# Patient Record
Sex: FEMALE | Race: NATIVE HAWAIIAN OR OTHER PACIFIC ISLANDER | NOT HISPANIC OR LATINO | URBAN - METROPOLITAN AREA
[De-identification: names, ages, dates, MRNs, and addresses within clinical notes are randomized per-mention and may not be internally consistent; named-entity substitution may affect disease eponyms.]

---

## 2018-09-20 ENCOUNTER — EMERGENCY (EMERGENCY)
Facility: HOSPITAL | Age: 83
LOS: 1 days | Discharge: HOME | End: 2018-09-20
Admitting: EMERGENCY MEDICINE
Payer: MEDICARE

## 2018-09-20 VITALS
TEMPERATURE: 98 F | RESPIRATION RATE: 18 BRPM | DIASTOLIC BLOOD PRESSURE: 80 MMHG | OXYGEN SATURATION: 98 % | HEART RATE: 69 BPM | SYSTOLIC BLOOD PRESSURE: 159 MMHG

## 2018-09-20 VITALS
RESPIRATION RATE: 18 BRPM | OXYGEN SATURATION: 97 % | WEIGHT: 162.7 LBS | TEMPERATURE: 98 F | HEART RATE: 94 BPM | SYSTOLIC BLOOD PRESSURE: 151 MMHG | DIASTOLIC BLOOD PRESSURE: 87 MMHG

## 2018-09-20 DIAGNOSIS — N63.20 UNSPECIFIED LUMP IN THE LEFT BREAST, UNSPECIFIED QUADRANT: ICD-10-CM

## 2018-09-20 PROCEDURE — 99284 EMERGENCY DEPT VISIT MOD MDM: CPT

## 2018-09-20 PROCEDURE — 76641 ULTRASOUND BREAST COMPLETE: CPT | Mod: 26,LT

## 2018-09-20 PROCEDURE — 76641 ULTRASOUND BREAST COMPLETE: CPT

## 2018-09-20 NOTE — ED PROVIDER NOTE - OBJECTIVE STATEMENT
82 yo female w/o any significant PMH, not on any medications, in the ER c/o breast lump that she noted few days ago. Pt reports rent mechanical fall on left side. Had pain to her left shoulder, arm, knee, had facial abrasions and everything healed. Pt had no bruise on her left breast and reports no pain after she fell. Accidentally she noted this lump today, which is not painful either.  Pt denies any discharge from her nipples. Pt had never had any mammogram or breast US done yet.

## 2018-09-20 NOTE — ED ADULT NURSE NOTE - OBJECTIVE STATEMENT
Pt reports mechanical fall on August 22, had pain to left side of body. Pt reports lump to left breast a week and a half after the fall. Pt denies any pain, denies chills, fever.

## 2018-09-20 NOTE — ED PROVIDER NOTE - PHYSICAL EXAMINATION
left breast- palpable lump/mass with some overlying skin dimpling  at 1-2 o'clock laterally from the nipple, no nipple discharge,

## 2018-09-20 NOTE — ED PROVIDER NOTE - MEDICAL DECISION MAKING DETAILS
84 yo female , generally healthy and not on any medications, in the Er c/o left breast lump. Pt had never had breast mammogram done. no signs of infection on the skin, no nipple discharge on exam. palpable lump+, non-painful. limited breast US done. pt will f/u with  tomorrow for further evaluation and treatment. Importance to f/u explained, possibility of breast CA discussed. pt verbalized understanding

## 2018-09-20 NOTE — ED ADULT NURSE NOTE - NSIMPLEMENTINTERV_GEN_ALL_ED
Implemented All Fall Risk Interventions:  [place] to call system. Call bell, personal items and telephone within reach. Instruct patient to call for assistance. Room bathroom lighting operational. Non-slip footwear when patient is off stretcher. Physically safe environment: no spills, clutter or unnecessary equipment. Stretcher in lowest position, wheels locked, appropriate side rails in place. Provide visual cue, wrist band, yellow gown, etc. Monitor gait and stability. Monitor for mental status changes and reorient to person, place, and time. Review medications for side effects contributing to fall risk. Reinforce activity limits and safety measures with patient and family.

## 2018-09-20 NOTE — ED ADULT TRIAGE NOTE - CHIEF COMPLAINT QUOTE
Pt states she had a mechanical fall on 8/22, hurt the L side of her body, but not feels fine. Pt since then she noticed a lump by her R breast and is wondering if it's related. Pt does not have a doctor, hasn't seen one in years.

## 2018-09-26 ENCOUNTER — APPOINTMENT (OUTPATIENT)
Dept: BREAST CENTER | Facility: CLINIC | Age: 83
End: 2018-09-26
Payer: MEDICARE

## 2018-09-26 VITALS
SYSTOLIC BLOOD PRESSURE: 124 MMHG | WEIGHT: 162 LBS | HEIGHT: 66 IN | HEART RATE: 75 BPM | BODY MASS INDEX: 26.03 KG/M2 | DIASTOLIC BLOOD PRESSURE: 79 MMHG

## 2018-09-26 PROCEDURE — 99203 OFFICE O/P NEW LOW 30 MIN: CPT

## 2018-09-28 ENCOUNTER — APPOINTMENT (OUTPATIENT)
Dept: INTERNAL MEDICINE | Facility: CLINIC | Age: 83
End: 2018-09-28
Payer: MEDICARE

## 2018-09-28 VITALS
HEART RATE: 80 BPM | BODY MASS INDEX: 26.15 KG/M2 | WEIGHT: 162 LBS | OXYGEN SATURATION: 100 % | DIASTOLIC BLOOD PRESSURE: 78 MMHG | SYSTOLIC BLOOD PRESSURE: 134 MMHG

## 2018-09-28 DIAGNOSIS — Z00.00 ENCOUNTER FOR GENERAL ADULT MEDICAL EXAMINATION W/OUT ABNORMAL FINDINGS: ICD-10-CM

## 2018-09-28 PROCEDURE — 99213 OFFICE O/P EST LOW 20 MIN: CPT | Mod: 25

## 2018-09-28 PROCEDURE — 36415 COLL VENOUS BLD VENIPUNCTURE: CPT

## 2018-09-28 NOTE — HISTORY OF PRESENT ILLNESS
[FreeTextEntry1] : Breast Mass;  [de-identified] : Breast mass with retraction of breast;  No other medical history;  No medication; No children

## 2018-09-28 NOTE — ASSESSMENT
[FreeTextEntry1] : Left breast mass which is likely malignant. Will need biopsy and have obtained all labs. To discuss with surgeon

## 2018-09-28 NOTE — PHYSICAL EXAM
[No Acute Distress] : no acute distress [Well Nourished] : well nourished [Well Developed] : well developed [Well-Appearing] : well-appearing [Normal Sclera/Conjunctiva] : normal sclera/conjunctiva [PERRL] : pupils equal round and reactive to light [EOMI] : extraocular movements intact [Fundoscopic Exam Performed] : fundoscopic ~T exam ~C was performed [Normal Outer Ear/Nose] : the outer ears and nose were normal in appearance [Normal Oropharynx] : the oropharynx was normal [Normal TMs] : both tympanic membranes were normal [Normal Nasal Mucosa] : the nasal mucosa was normal [No JVD] : no jugular venous distention [Supple] : supple [No Lymphadenopathy] : no lymphadenopathy [Thyroid Normal, No Nodules] : the thyroid was normal and there were no nodules present [No Respiratory Distress] : no respiratory distress  [No Accessory Muscle Use] : no accessory muscle use [Normal Rate] : normal rate  [Regular Rhythm] : with a regular rhythm [Normal S1, S2] : normal S1 and S2 [No Murmur] : no murmur heard [Soft] : abdomen soft [Non Tender] : non-tender [No HSM] : no HSM [Normal Bowel Sounds] : normal bowel sounds [Normal Supraclavicular Nodes] : no supraclavicular lymphadenopathy [No CVA Tenderness] : no CVA  tenderness [No Joint Swelling] : no joint swelling [Normal Gait] : normal gait [Coordination Grossly Intact] : coordination grossly intact [de-identified] : left breast with mass and retraction

## 2018-10-01 ENCOUNTER — RESULT REVIEW (OUTPATIENT)
Age: 83
End: 2018-10-01

## 2018-10-01 ENCOUNTER — FORM ENCOUNTER (OUTPATIENT)
Age: 83
End: 2018-10-01

## 2018-10-02 ENCOUNTER — OUTPATIENT (OUTPATIENT)
Dept: OUTPATIENT SERVICES | Facility: HOSPITAL | Age: 83
LOS: 1 days | End: 2018-10-02
Payer: MEDICARE

## 2018-10-02 ENCOUNTER — APPOINTMENT (OUTPATIENT)
Dept: MAMMOGRAPHY | Facility: HOSPITAL | Age: 83
End: 2018-10-02
Payer: MEDICARE

## 2018-10-02 LAB
25(OH)D3 SERPL-MCNC: 21.3 NG/ML
ALBUMIN SERPL ELPH-MCNC: 4.6 G/DL
ALP BLD-CCNC: 93 U/L
ALT SERPL-CCNC: 8 U/L
ANION GAP SERPL CALC-SCNC: 16 MMOL/L
APPEARANCE: CLEAR
APTT BLD: 30.7 SEC
AST SERPL-CCNC: 18 U/L
BACTERIA: NEGATIVE
BASOPHILS # BLD AUTO: 0.03 K/UL
BASOPHILS NFR BLD AUTO: 0.5 %
BILIRUB SERPL-MCNC: 1.2 MG/DL
BILIRUBIN URINE: NEGATIVE
BLOOD URINE: NEGATIVE
BUN SERPL-MCNC: 13 MG/DL
CALCIUM SERPL-MCNC: 10 MG/DL
CHLORIDE SERPL-SCNC: 99 MMOL/L
CHOLEST SERPL-MCNC: 261 MG/DL
CHOLEST/HDLC SERPL: 3.6 RATIO
CO2 SERPL-SCNC: 24 MMOL/L
COLOR: YELLOW
CREAT SERPL-MCNC: 0.72 MG/DL
EOSINOPHIL # BLD AUTO: 0.07 K/UL
EOSINOPHIL NFR BLD AUTO: 1.2 %
GLUCOSE QUALITATIVE U: NEGATIVE MG/DL
GLUCOSE SERPL-MCNC: 90 MG/DL
HBA1C MFR BLD HPLC: 6.1 %
HCT VFR BLD CALC: 38.5 %
HDLC SERPL-MCNC: 73 MG/DL
HGB BLD-MCNC: 12.1 G/DL
HYALINE CASTS: 1 /LPF
IMM GRANULOCYTES NFR BLD AUTO: 0.3 %
INR PPP: 0.98 RATIO
KETONES URINE: NEGATIVE
LDLC SERPL CALC-MCNC: 169 MG/DL
LEUKOCYTE ESTERASE URINE: NEGATIVE
LYMPHOCYTES # BLD AUTO: 1.38 K/UL
LYMPHOCYTES NFR BLD AUTO: 23 %
MAN DIFF?: NORMAL
MCHC RBC-ENTMCNC: 27.7 PG
MCHC RBC-ENTMCNC: 31.4 GM/DL
MCV RBC AUTO: 88.1 FL
MICROSCOPIC-UA: NORMAL
MONOCYTES # BLD AUTO: 0.48 K/UL
MONOCYTES NFR BLD AUTO: 8 %
NEUTROPHILS # BLD AUTO: 4.02 K/UL
NEUTROPHILS NFR BLD AUTO: 67 %
NITRITE URINE: NEGATIVE
PH URINE: 7
PLATELET # BLD AUTO: 275 K/UL
POTASSIUM SERPL-SCNC: 4.4 MMOL/L
PROT SERPL-MCNC: 7.6 G/DL
PROTEIN URINE: NEGATIVE MG/DL
PT BLD: 11.1 SEC
RBC # BLD: 4.37 M/UL
RBC # FLD: 15.7 %
RED BLOOD CELLS URINE: 6 /HPF
SODIUM SERPL-SCNC: 138 MMOL/L
SPECIFIC GRAVITY URINE: 1.02
SQUAMOUS EPITHELIAL CELLS: 1 /HPF
TRIGL SERPL-MCNC: 96 MG/DL
UROBILINOGEN URINE: NEGATIVE MG/DL
WBC # FLD AUTO: 6 K/UL
WHITE BLOOD CELLS URINE: 1 /HPF

## 2018-10-02 PROCEDURE — 77062 BREAST TOMOSYNTHESIS BI: CPT | Mod: 26

## 2018-10-02 PROCEDURE — 88305 TISSUE EXAM BY PATHOLOGIST: CPT

## 2018-10-02 PROCEDURE — G0279: CPT

## 2018-10-02 PROCEDURE — 88360 TUMOR IMMUNOHISTOCHEM/MANUAL: CPT

## 2018-10-02 PROCEDURE — G0279: CPT | Mod: 26

## 2018-10-02 PROCEDURE — 77065 DX MAMMO INCL CAD UNI: CPT | Mod: 26,LT,59

## 2018-10-02 PROCEDURE — 19083 BX BREAST 1ST LESION US IMAG: CPT

## 2018-10-02 PROCEDURE — 77066 DX MAMMO INCL CAD BI: CPT

## 2018-10-02 PROCEDURE — 77065 DX MAMMO INCL CAD UNI: CPT

## 2018-10-02 PROCEDURE — A4648: CPT

## 2018-10-02 PROCEDURE — 19083 BX BREAST 1ST LESION US IMAG: CPT | Mod: LT

## 2018-10-02 PROCEDURE — 77066 DX MAMMO INCL CAD BI: CPT | Mod: 26

## 2018-10-03 LAB
SURGICAL PATHOLOGY STUDY: SIGNIFICANT CHANGE UP
SURGICAL PATHOLOGY STUDY: SIGNIFICANT CHANGE UP

## 2018-10-12 ENCOUNTER — APPOINTMENT (OUTPATIENT)
Dept: BREAST CENTER | Facility: CLINIC | Age: 83
End: 2018-10-12
Payer: MEDICARE

## 2018-10-12 VITALS
HEART RATE: 68 BPM | WEIGHT: 162 LBS | BODY MASS INDEX: 26.03 KG/M2 | HEIGHT: 66 IN | DIASTOLIC BLOOD PRESSURE: 77 MMHG | TEMPERATURE: 97.7 F | SYSTOLIC BLOOD PRESSURE: 135 MMHG

## 2018-10-12 PROCEDURE — 99214 OFFICE O/P EST MOD 30 MIN: CPT

## 2018-10-22 ENCOUNTER — CHART COPY (OUTPATIENT)
Age: 83
End: 2018-10-22

## 2018-10-23 ENCOUNTER — CHART COPY (OUTPATIENT)
Age: 83
End: 2018-10-23

## 2018-10-23 DIAGNOSIS — R92.8 OTHER ABNORMAL AND INCONCLUSIVE FINDINGS ON DIAGNOSTIC IMAGING OF BREAST: ICD-10-CM

## 2018-10-24 ENCOUNTER — CHART COPY (OUTPATIENT)
Age: 83
End: 2018-10-24

## 2018-11-04 ENCOUNTER — RESULT REVIEW (OUTPATIENT)
Age: 83
End: 2018-11-04

## 2018-11-04 ENCOUNTER — FORM ENCOUNTER (OUTPATIENT)
Age: 83
End: 2018-11-04

## 2018-11-05 ENCOUNTER — OUTPATIENT (OUTPATIENT)
Dept: OUTPATIENT SERVICES | Facility: HOSPITAL | Age: 83
LOS: 1 days | End: 2018-11-05
Payer: MEDICARE

## 2018-11-05 ENCOUNTER — APPOINTMENT (OUTPATIENT)
Dept: MAMMOGRAPHY | Facility: HOSPITAL | Age: 83
End: 2018-11-05
Payer: MEDICARE

## 2018-11-05 PROCEDURE — 19081 BX BREAST 1ST LESION STRTCTC: CPT

## 2018-11-05 PROCEDURE — A4648: CPT

## 2018-11-05 PROCEDURE — 88305 TISSUE EXAM BY PATHOLOGIST: CPT

## 2018-11-05 PROCEDURE — 19081 BX BREAST 1ST LESION STRTCTC: CPT | Mod: RT

## 2018-11-06 LAB — SURGICAL PATHOLOGY STUDY: SIGNIFICANT CHANGE UP

## 2018-11-11 ENCOUNTER — FORM ENCOUNTER (OUTPATIENT)
Age: 83
End: 2018-11-11

## 2018-11-12 ENCOUNTER — APPOINTMENT (OUTPATIENT)
Dept: INTERNAL MEDICINE | Facility: CLINIC | Age: 83
End: 2018-11-12
Payer: MEDICARE

## 2018-11-12 ENCOUNTER — OUTPATIENT (OUTPATIENT)
Dept: OUTPATIENT SERVICES | Facility: HOSPITAL | Age: 83
LOS: 1 days | End: 2018-11-12
Payer: MEDICARE

## 2018-11-12 VITALS
BODY MASS INDEX: 26.03 KG/M2 | HEART RATE: 77 BPM | OXYGEN SATURATION: 95 % | SYSTOLIC BLOOD PRESSURE: 144 MMHG | DIASTOLIC BLOOD PRESSURE: 83 MMHG | TEMPERATURE: 99.2 F | HEIGHT: 66 IN | WEIGHT: 162 LBS

## 2018-11-12 DIAGNOSIS — Z01.818 ENCOUNTER FOR OTHER PREPROCEDURAL EXAMINATION: ICD-10-CM

## 2018-11-12 DIAGNOSIS — N63.20 UNSPECIFIED LUMP IN THE LEFT BREAST, UNSPECIFIED QUADRANT: ICD-10-CM

## 2018-11-12 PROCEDURE — 71046 X-RAY EXAM CHEST 2 VIEWS: CPT | Mod: 26

## 2018-11-12 PROCEDURE — 99213 OFFICE O/P EST LOW 20 MIN: CPT | Mod: 25

## 2018-11-12 PROCEDURE — 71046 X-RAY EXAM CHEST 2 VIEWS: CPT

## 2018-11-12 PROCEDURE — 36415 COLL VENOUS BLD VENIPUNCTURE: CPT

## 2018-11-12 NOTE — PHYSICAL EXAM
[No Acute Distress] : no acute distress [Well Nourished] : well nourished [Well Developed] : well developed [Well-Appearing] : well-appearing [Normal Sclera/Conjunctiva] : normal sclera/conjunctiva [PERRL] : pupils equal round and reactive to light [EOMI] : extraocular movements intact [Fundoscopic Exam Performed] : fundoscopic ~T exam ~C was performed [Normal Outer Ear/Nose] : the outer ears and nose were normal in appearance [Normal Oropharynx] : the oropharynx was normal [Normal TMs] : both tympanic membranes were normal [Normal Nasal Mucosa] : the nasal mucosa was normal [No JVD] : no jugular venous distention [Supple] : supple [No Lymphadenopathy] : no lymphadenopathy [Thyroid Normal, No Nodules] : the thyroid was normal and there were no nodules present [No Respiratory Distress] : no respiratory distress  [Clear to Auscultation] : lungs were clear to auscultation bilaterally [No Accessory Muscle Use] : no accessory muscle use [Normal Percussion] : the chest was normal to percussion [Normal Rate] : normal rate  [Regular Rhythm] : with a regular rhythm [No Joint Swelling] : no joint swelling [Grossly Normal Strength/Tone] : grossly normal strength/tone [de-identified] : Left breast mass

## 2018-11-12 NOTE — HISTORY OF PRESENT ILLNESS
[No Pertinent Cardiac History] : no history of aortic stenosis, atrial fibrillation, coronary artery disease, recent myocardial infarction, or implantable device/pacemaker [No Adverse Anesthesia Reaction] : no adverse anesthesia reaction in self or family member [Family Member] : no family member with adverse anesthesia reaction/sudden death [Self] : no previous adverse anesthesia reaction [Chronic Anticoagulation] : no chronic anticoagulation [Chronic Kidney Disease] : no chronic kidney disease [Diabetes] : no diabetes [FreeTextEntry1] : Left Mastectomy [FreeTextEntry2] : 11/19/2018 [FreeTextEntry3] : Dr. lopez [FreeTextEntry4] : Left breast cancer. For mastectomy. No other medical problems;  Take no medications; \par No Aspirin

## 2018-11-13 ENCOUNTER — CHART COPY (OUTPATIENT)
Age: 83
End: 2018-11-13

## 2018-11-13 LAB
ALBUMIN SERPL ELPH-MCNC: 4.3 G/DL
ALP BLD-CCNC: 90 U/L
ALT SERPL-CCNC: 6 U/L
ANION GAP SERPL CALC-SCNC: 13 MMOL/L
APPEARANCE: CLEAR
APTT BLD: 29.2 SEC
AST SERPL-CCNC: 16 U/L
BACTERIA: NEGATIVE
BASOPHILS # BLD AUTO: 0.02 K/UL
BASOPHILS NFR BLD AUTO: 0.3 %
BILIRUB SERPL-MCNC: 0.8 MG/DL
BILIRUBIN URINE: NEGATIVE
BLOOD URINE: ABNORMAL
BUN SERPL-MCNC: 10 MG/DL
CALCIUM SERPL-MCNC: 9.8 MG/DL
CHLORIDE SERPL-SCNC: 102 MMOL/L
CO2 SERPL-SCNC: 22 MMOL/L
COLOR: YELLOW
CREAT SERPL-MCNC: 0.76 MG/DL
EOSINOPHIL # BLD AUTO: 0.05 K/UL
EOSINOPHIL NFR BLD AUTO: 0.9 %
GLUCOSE QUALITATIVE U: NEGATIVE MG/DL
GLUCOSE SERPL-MCNC: 95 MG/DL
HCT VFR BLD CALC: 36.8 %
HGB BLD-MCNC: 11.8 G/DL
HYALINE CASTS: 2 /LPF
IMM GRANULOCYTES NFR BLD AUTO: 0 %
INR PPP: 0.98 RATIO
KETONES URINE: NEGATIVE
LEUKOCYTE ESTERASE URINE: NEGATIVE
LYMPHOCYTES # BLD AUTO: 1.17 K/UL
LYMPHOCYTES NFR BLD AUTO: 20.2 %
MAN DIFF?: NORMAL
MCHC RBC-ENTMCNC: 28.5 PG
MCHC RBC-ENTMCNC: 32.1 GM/DL
MCV RBC AUTO: 88.9 FL
MICROSCOPIC-UA: NORMAL
MONOCYTES # BLD AUTO: 0.35 K/UL
MONOCYTES NFR BLD AUTO: 6 %
NEUTROPHILS # BLD AUTO: 4.2 K/UL
NEUTROPHILS NFR BLD AUTO: 72.6 %
NITRITE URINE: NEGATIVE
PH URINE: 6
PLATELET # BLD AUTO: 310 K/UL
POTASSIUM SERPL-SCNC: 4.4 MMOL/L
PROT SERPL-MCNC: 7.1 G/DL
PROTEIN URINE: NEGATIVE MG/DL
PT BLD: 11 SEC
RBC # BLD: 4.14 M/UL
RBC # FLD: 15.5 %
RED BLOOD CELLS URINE: 6 /HPF
SODIUM SERPL-SCNC: 137 MMOL/L
SPECIFIC GRAVITY URINE: 1.02
SQUAMOUS EPITHELIAL CELLS: 2 /HPF
UROBILINOGEN URINE: NEGATIVE MG/DL
WBC # FLD AUTO: 5.79 K/UL
WHITE BLOOD CELLS URINE: 4 /HPF

## 2018-11-16 VITALS
RESPIRATION RATE: 18 BRPM | HEIGHT: 66 IN | WEIGHT: 157.85 LBS | SYSTOLIC BLOOD PRESSURE: 118 MMHG | OXYGEN SATURATION: 97 % | TEMPERATURE: 98 F | HEART RATE: 79 BPM | DIASTOLIC BLOOD PRESSURE: 75 MMHG

## 2018-11-17 ENCOUNTER — FORM ENCOUNTER (OUTPATIENT)
Age: 83
End: 2018-11-17

## 2018-11-18 ENCOUNTER — OUTPATIENT (OUTPATIENT)
Dept: OUTPATIENT SERVICES | Facility: HOSPITAL | Age: 83
LOS: 1 days | End: 2018-11-18
Payer: MEDICARE

## 2018-11-18 DIAGNOSIS — Z90.89 ACQUIRED ABSENCE OF OTHER ORGANS: Chronic | ICD-10-CM

## 2018-11-18 PROCEDURE — 78195 LYMPH SYSTEM IMAGING: CPT | Mod: 26

## 2018-11-19 ENCOUNTER — OUTPATIENT (OUTPATIENT)
Dept: INPATIENT UNIT | Facility: HOSPITAL | Age: 83
LOS: 1 days | Discharge: HOME | End: 2018-11-19
Payer: MEDICARE

## 2018-11-19 ENCOUNTER — RESULT REVIEW (OUTPATIENT)
Age: 83
End: 2018-11-19

## 2018-11-19 ENCOUNTER — APPOINTMENT (OUTPATIENT)
Dept: BREAST CENTER | Facility: HOSPITAL | Age: 83
End: 2018-11-19

## 2018-11-19 DIAGNOSIS — Z90.89 ACQUIRED ABSENCE OF OTHER ORGANS: Chronic | ICD-10-CM

## 2018-11-19 PROCEDURE — 19307 MAST MOD RAD: CPT | Mod: LT

## 2018-11-19 PROCEDURE — 19303 MAST SIMPLE COMPLETE: CPT | Mod: GC

## 2018-11-19 PROCEDURE — 38745 REMOVE ARMPIT LYMPH NODES: CPT | Mod: GC,59

## 2018-11-19 PROCEDURE — 38525 BIOPSY/REMOVAL LYMPH NODES: CPT | Mod: GC,59

## 2018-11-19 RX ORDER — SODIUM CHLORIDE 9 MG/ML
1000 INJECTION, SOLUTION INTRAVENOUS
Qty: 0 | Refills: 0 | Status: DISCONTINUED | OUTPATIENT
Start: 2018-11-19 | End: 2018-11-20

## 2018-11-19 RX ORDER — OXYCODONE HYDROCHLORIDE 5 MG/1
5 TABLET ORAL
Qty: 0 | Refills: 0 | Status: DISCONTINUED | OUTPATIENT
Start: 2018-11-19 | End: 2018-11-20

## 2018-11-19 RX ORDER — OXYCODONE HYDROCHLORIDE 5 MG/1
10 TABLET ORAL EVERY 4 HOURS
Qty: 0 | Refills: 0 | Status: DISCONTINUED | OUTPATIENT
Start: 2018-11-19 | End: 2018-11-20

## 2018-11-19 RX ORDER — ONDANSETRON 8 MG/1
4 TABLET, FILM COATED ORAL
Qty: 0 | Refills: 0 | Status: DISCONTINUED | OUTPATIENT
Start: 2018-11-19 | End: 2018-11-20

## 2018-11-19 NOTE — PROGRESS NOTE ADULT - ATTENDING COMMENTS
I agree with above  pateint can likely be discharged in the am with pain control measures and vns for drain care

## 2018-11-19 NOTE — BRIEF OPERATIVE NOTE - PROCEDURE
<<-----Click on this checkbox to enter Procedure Simple mastectomy with biopsy of sentinel lymph node, with axillary lymph node dissection if indicated  11/19/2018  Left side. Axillary lymph node dissection performed  Active  MANPREET

## 2018-11-19 NOTE — BRIEF OPERATIVE NOTE - OPERATION/FINDINGS
An elliptical incision with made with the scalpel.  Electrocautery was used to dissect to level of pectoralis muscle.  A sentinel lymph node was identified using the radioactive detector probe.  Frozen section of sentinel node revealed carcinoma, so axillary lymph node dissection was performed.  Thoracodorsal nerve, long thoracic nerve, axillary vein, pectoralis major muscle, pectoralis minor muscle, latissimus dorsi muscle, and serratus muscle were identified intraoperatively.  Axillary lymph nodes were collected.  Incision was closed using vicryl suture for deep dermal layer and monocryl for epidermal layer.

## 2018-11-20 VITALS
RESPIRATION RATE: 18 BRPM | TEMPERATURE: 99 F | DIASTOLIC BLOOD PRESSURE: 76 MMHG | SYSTOLIC BLOOD PRESSURE: 120 MMHG | HEART RATE: 68 BPM | OXYGEN SATURATION: 98 %

## 2018-11-20 LAB
ANION GAP SERPL CALC-SCNC: 11 MMOL/L — SIGNIFICANT CHANGE UP (ref 5–17)
BUN SERPL-MCNC: 11 MG/DL — SIGNIFICANT CHANGE UP (ref 7–23)
CALCIUM SERPL-MCNC: 8.6 MG/DL — SIGNIFICANT CHANGE UP (ref 8.4–10.5)
CHLORIDE SERPL-SCNC: 104 MMOL/L — SIGNIFICANT CHANGE UP (ref 96–108)
CO2 SERPL-SCNC: 22 MMOL/L — SIGNIFICANT CHANGE UP (ref 22–31)
CREAT SERPL-MCNC: 0.59 MG/DL — SIGNIFICANT CHANGE UP (ref 0.5–1.3)
GLUCOSE SERPL-MCNC: 104 MG/DL — HIGH (ref 70–99)
HCT VFR BLD CALC: 31.3 % — LOW (ref 34.5–45)
HGB BLD-MCNC: 10.2 G/DL — LOW (ref 11.5–15.5)
MAGNESIUM SERPL-MCNC: 2.3 MG/DL — SIGNIFICANT CHANGE UP (ref 1.6–2.6)
MCHC RBC-ENTMCNC: 28.1 PG — SIGNIFICANT CHANGE UP (ref 27–34)
MCHC RBC-ENTMCNC: 32.6 G/DL — SIGNIFICANT CHANGE UP (ref 32–36)
MCV RBC AUTO: 86.2 FL — SIGNIFICANT CHANGE UP (ref 80–100)
PHOSPHATE SERPL-MCNC: 3.2 MG/DL — SIGNIFICANT CHANGE UP (ref 2.5–4.5)
PLATELET # BLD AUTO: 223 K/UL — SIGNIFICANT CHANGE UP (ref 150–400)
POTASSIUM SERPL-MCNC: 4 MMOL/L — SIGNIFICANT CHANGE UP (ref 3.5–5.3)
POTASSIUM SERPL-SCNC: 4 MMOL/L — SIGNIFICANT CHANGE UP (ref 3.5–5.3)
RBC # BLD: 3.63 M/UL — LOW (ref 3.8–5.2)
RBC # FLD: 15 % — SIGNIFICANT CHANGE UP (ref 10.3–16.9)
SODIUM SERPL-SCNC: 137 MMOL/L — SIGNIFICANT CHANGE UP (ref 135–145)
WBC # BLD: 8.1 K/UL — SIGNIFICANT CHANGE UP (ref 3.8–10.5)
WBC # FLD AUTO: 8.1 K/UL — SIGNIFICANT CHANGE UP (ref 3.8–10.5)

## 2018-11-20 NOTE — PROGRESS NOTE ADULT - ASSESSMENT
82 yo female s/p left breast mastectomy and sentinal lymph node biopsy    admit for 23 hr obs  pain/nausea control  IVF  regular diet as tolerated  DVT ppx  AM labs

## 2018-11-20 NOTE — PROGRESS NOTE ADULT - SUBJECTIVE AND OBJECTIVE BOX
INTERVAL HPI/OVERNIGHT EVENTS: JANIE    STATUS POST:  left mastectomy, sentinel lymph node biopsy    POST OPERATIVE DAY #: 1    SUBJECTIVE:  pt seen at bedside, denies pain, numbness/tingling in the extremities    MEDICATIONS  (STANDING):  lactated ringers. 1000 milliLiter(s) (100 mL/Hr) IV Continuous <Continuous>    MEDICATIONS  (PRN):  ondansetron Injectable 4 milliGRAM(s) IV Push every 2 hours PRN Nausea and/or Vomiting  oxyCODONE    IR 10 milliGRAM(s) Oral every 4 hours PRN Severe Pain (7 - 10)  oxyCODONE    IR 5 milliGRAM(s) Oral every 3 hours PRN Severe Pain (7 - 10)      Vital Signs Last 24 Hrs  T(C): 37.6 (20 Nov 2018 05:14), Max: 37.7 (19 Nov 2018 17:22)  T(F): 99.6 (20 Nov 2018 05:14), Max: 99.9 (19 Nov 2018 17:22)  HR: 83 (20 Nov 2018 05:14) (62 - 86)  BP: 106/67 (20 Nov 2018 05:14) (106/67 - 138/82)  BP(mean): 82 (19 Nov 2018 15:43) (79 - 87)  RR: 18 (20 Nov 2018 05:14) (11 - 18)  SpO2: 96% (20 Nov 2018 05:14) (96% - 100%)    PHYSICAL EXAM:      Constitutional: A&Ox3    Breasts: incision c/d/i with mild bruising, soft, JASON x 2 in place to self suction    Respiratory: non labored breathing, no respiratory distress    Cardiovascular: NSR, RRR    Gastrointestinal: soft, nontender, nondistended    Extremities: (-) edema                  I&O's Detail    19 Nov 2018 07:01  -  20 Nov 2018 07:00  --------------------------------------------------------  IN:    lactated ringers.: 1600 mL  Total IN: 1600 mL    OUT:    Bulb: 175 mL    Bulb: 90 mL    Voided: 1000 mL  Total OUT: 1265 mL    Total NET: 335 mL      20 Nov 2018 07:01  -  20 Nov 2018 07:55  --------------------------------------------------------  IN:    lactated ringers.: 100 mL  Total IN: 100 mL    OUT:  Total OUT: 0 mL    Total NET: 100 mL          LABS:                        10.2   8.1   )-----------( 223      ( 20 Nov 2018 06:42 )             31.3     11-20    137  |  104  |  11  ----------------------------<  104<H>  4.0   |  22  |  0.59    Ca    8.6      20 Nov 2018 06:41  Phos  3.2     11-20  Mg     2.3     11-20            RADIOLOGY & ADDITIONAL STUDIES:
Team 1 Surgery Post-Op Note, PCN:     Pre-Op Dx: breast ca  Procedure: Simple mastectomy with biopsy of sentinel lymph node, with axillary lymph node dissection if indicated    Surgeon: Chema    Subjective: pt seen at bedside, denies pain, denies numbness/tingling in the upper extremities      Vital Signs Last 24 Hrs  T(C): 36.2 (19 Nov 2018 12:43), Max: 36.2 (19 Nov 2018 12:43)  T(F): 97.2 (19 Nov 2018 12:43), Max: 97.2 (19 Nov 2018 12:43)  HR: 72 (19 Nov 2018 13:43) (70 - 86)  BP: 115/61 (19 Nov 2018 13:43) (114/56 - 121/62)  BP(mean): 82 (19 Nov 2018 13:43) (79 - 85)  RR: 15 (19 Nov 2018 13:43) (12 - 16)  SpO2: 100% (19 Nov 2018 13:43) (98% - 100%)    Physical Exam:  General: NAD, resting comfortably in bed  Pulmonary: Nonlabored breathing, no respiratory distress  Cardiovascular: NSR  Breast: incisions c/d/i without ecchymosis or erythema, JASON drain x 2 in place with minimal serosang output  Abdominal: soft, NT/ND  Extremities: WWP, normal strength  Neuro: A/O x 3, no focal deficits, normal sensation  Pulses: palpable distal pulses      LABS:            CAPILLARY BLOOD GLUCOSE                  Radiology and Additional Studies:    Assessment:83y Female s/p above procedure    Plan:  Pain/nausea control PRN  Home meds  Incentive spirometer/OOB/Ambulate  IVF, Diet: regular as tolerated  AM labs  DC in AM

## 2018-11-21 LAB — SURGICAL PATHOLOGY STUDY: SIGNIFICANT CHANGE UP

## 2018-11-23 PROCEDURE — 78195 LYMPH SYSTEM IMAGING: CPT

## 2018-11-23 PROCEDURE — 83735 ASSAY OF MAGNESIUM: CPT

## 2018-11-23 PROCEDURE — A9541: CPT

## 2018-11-23 PROCEDURE — 36415 COLL VENOUS BLD VENIPUNCTURE: CPT

## 2018-11-23 PROCEDURE — 84100 ASSAY OF PHOSPHORUS: CPT

## 2018-11-23 PROCEDURE — 80048 BASIC METABOLIC PNL TOTAL CA: CPT

## 2018-11-23 PROCEDURE — 88331 PATH CONSLTJ SURG 1 BLK 1SPC: CPT

## 2018-11-23 PROCEDURE — 19302 P-MASTECTOMY W/LN REMOVAL: CPT | Mod: LT

## 2018-11-23 PROCEDURE — 85027 COMPLETE CBC AUTOMATED: CPT

## 2018-11-23 PROCEDURE — 88305 TISSUE EXAM BY PATHOLOGIST: CPT

## 2018-11-23 PROCEDURE — 88332 PATH CONSLTJ SURG EA ADD BLK: CPT

## 2018-11-23 PROCEDURE — 88307 TISSUE EXAM BY PATHOLOGIST: CPT

## 2018-11-28 ENCOUNTER — APPOINTMENT (OUTPATIENT)
Dept: BREAST CENTER | Facility: CLINIC | Age: 83
End: 2018-11-28
Payer: MEDICARE

## 2018-11-28 VITALS
SYSTOLIC BLOOD PRESSURE: 135 MMHG | TEMPERATURE: 98.6 F | WEIGHT: 162 LBS | HEART RATE: 81 BPM | BODY MASS INDEX: 26.03 KG/M2 | HEIGHT: 66 IN | DIASTOLIC BLOOD PRESSURE: 81 MMHG

## 2018-11-28 PROCEDURE — 99024 POSTOP FOLLOW-UP VISIT: CPT

## 2018-12-07 ENCOUNTER — CHART COPY (OUTPATIENT)
Age: 83
End: 2018-12-07

## 2018-12-07 ENCOUNTER — APPOINTMENT (OUTPATIENT)
Dept: BREAST CENTER | Facility: CLINIC | Age: 83
End: 2018-12-07

## 2018-12-07 VITALS
HEIGHT: 66 IN | SYSTOLIC BLOOD PRESSURE: 142 MMHG | DIASTOLIC BLOOD PRESSURE: 73 MMHG | BODY MASS INDEX: 26.03 KG/M2 | WEIGHT: 162 LBS | TEMPERATURE: 97.4 F | HEART RATE: 71 BPM

## 2019-01-10 PROBLEM — C50.919 MALIGNANT NEOPLASM OF UNSPECIFIED SITE OF UNSPECIFIED FEMALE BREAST: Chronic | Status: ACTIVE | Noted: 2018-11-16

## 2019-01-10 PROBLEM — N63.20 UNSPECIFIED LUMP IN THE LEFT BREAST, UNSPECIFIED QUADRANT: Chronic | Status: ACTIVE | Noted: 2018-11-16

## 2019-01-18 ENCOUNTER — APPOINTMENT (OUTPATIENT)
Dept: RADIATION ONCOLOGY | Facility: CLINIC | Age: 84
End: 2019-01-18
Payer: MEDICARE

## 2019-01-18 VITALS
OXYGEN SATURATION: 97 % | DIASTOLIC BLOOD PRESSURE: 83 MMHG | SYSTOLIC BLOOD PRESSURE: 148 MMHG | WEIGHT: 163.2 LBS | RESPIRATION RATE: 18 BRPM | HEIGHT: 66 IN | BODY MASS INDEX: 26.23 KG/M2 | HEART RATE: 78 BPM

## 2019-01-18 DIAGNOSIS — Z78.9 OTHER SPECIFIED HEALTH STATUS: ICD-10-CM

## 2019-01-18 PROCEDURE — 99205 OFFICE O/P NEW HI 60 MIN: CPT | Mod: 25

## 2019-01-18 NOTE — REVIEW OF SYSTEMS
[Patient Intake Form Reviewed] : Patient intake form was reviewed [Negative] : Allergic/Immunologic [de-identified] : s/p Left mastectomy, incision well healed [de-identified] : "ticked off"

## 2019-01-18 NOTE — OB/GYN HISTORY
[unknown] : the patient is unsure of the date of her LMP [Menopause Age: ____] : patient was [unfilled] years old at menopause

## 2019-01-18 NOTE — PHYSICAL EXAM
[Range of Motion to Joints] : range of motion to joints [Normal] : oriented to person, place and time, the affect was normal, the mood was normal and not anxious [de-identified] : s/p left mastectomy, lateral incision left breast, well healed, loose skin.  [de-identified] : s/p left mastectomy, lateral incision left breast, well healed, loose skin.

## 2019-01-18 NOTE — HISTORY OF PRESENT ILLNESS
[FreeTextEntry1] : Ms Astrid Gallardo is an 83F, never smoker, referred by Dr. Beasley for consideration of radiation therapy for LEFT breast cancer. \par \par She experienced a fall on 8/22/18 and subsequently palpated a left breast mass a week later and followed up in [facility] ED. She notes she did not have a PCP at the time as she had been generally healthy. Denies any skin changes or nipple discharge at the time. On 9/20/18, she underwent LEFT breast U/S, which revealed the following:\par At the 1:00 position, 4cmFN a 1.8 x 1.6 x 1.6 cm hypo/isoechoic mass with ill defined borders demonstrating internal vascularity; BI-RADS 5. A full mammogram evaluation with likely biopsy was recommended. She saw Dr. Bear for initial eval on 9/26/18, was noted to have skin dimpling.\par \par On 10/2/18, she underwent US guided biopsy of the mass. 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 Radiology was subsequently asked to comment on the RIGHT breast, and it showed the following:\par Multiple scattered and grouped calcifications present in the right breast. Some of the calcifications have a linear distribution pattern. \par Recommendation for stereotactic biopsy of the most suspicious osseous lesions in the right breast.\par \par On 11/5/18, she had stereotactic core biopsy (right mid lower breast) with pathology revealing the following:\par - Breast tissue with duct ectasia, cysts and usual ductal hyperplasia\par - Calcifications associated with benign epithelium and stroma\par \par She underwent LEFT mastectomy and ALND on 11/19/18.  Final pathology revealed the following: \par 1. Left sentinel lymph node, excision: - Metastatic carcinoma involving 1 lymph node with extranodal extension spanning 5 mm (1/1).  \par 2. Left breast, mastectomy:\par - Invasive duct carcinoma, well-differentiated with prominent central sclerosis. 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 DCIS is noted only adjacent to invasive tumor.\par -All margins negative for invasive and in-situ duct carcinoma with greater than 1 cm clearance.\par - Biopsy site changes.\par 3. Medial margin, excision: - Breast tissue negative for carcinoma.\par 4. Left axillary lymph node dissection:\par - 17 lymph nodes negative for tumor.\par - Focus of invasive duct carcinoma present within adjacent adipose tissue. \par \par Note: The largest contiguous lymph node metastasis in part 1 measures 3 mm in greatest dimension.  A focus of extranodal\par extension spanning 5mm is noted in part 1.  In part 4, A focus of carcinoma spanning 11 mm is noted within fat.  The tumor in parts 1 and 4 are morphologically similar to the tumor in part 2.\par \par She last saw Dr. Bear on 11/26/18, at which time she was advised to see Radiation Oncology and Medical Oncology. She had 2 JASON drains, second one removed approx 3 weeks post-op. She saw Dr. Beasley on 1/4/19, at which treatment options were discussed to include RT. Oncotype testing was arranged (result pending), and patient will see Dr. Beasley again on 1/25/19. \par \par Today, she reports she is feeling well, although understandably upset about her diagnosis. She denies breast pain, breast or UE edema, fever, chills, fatigue, CP, SOB, or any other c/o. \par               \par Family history is negative for breast or ovarian cancer.

## 2019-01-18 NOTE — VITALS
[Maximal Pain Intensity: 0/10] : 0/10 [Least Pain Intensity: 0/10] : 0/10 [90: Able to carry normal activity; minor signs or symptoms of disease.] : 90: Able to carry normal activity; minor signs or symptoms of disease.  [Date: ____________] : Patient's last distress assessment performed on [unfilled]. [3 - Distress Level] : Distress Level: 3 [FreeTextEntry7] : Feels a sense of "why me?"

## 2019-02-01 ENCOUNTER — OUTPATIENT (OUTPATIENT)
Dept: OUTPATIENT SERVICES | Facility: HOSPITAL | Age: 84
LOS: 1 days | End: 2019-02-01
Payer: MEDICARE

## 2019-02-01 ENCOUNTER — APPOINTMENT (OUTPATIENT)
Dept: RADIATION ONCOLOGY | Facility: CLINIC | Age: 84
End: 2019-02-01

## 2019-02-01 ENCOUNTER — APPOINTMENT (OUTPATIENT)
Dept: CT IMAGING | Facility: HOSPITAL | Age: 84
End: 2019-02-01
Payer: MEDICARE

## 2019-02-01 DIAGNOSIS — Z90.89 ACQUIRED ABSENCE OF OTHER ORGANS: Chronic | ICD-10-CM

## 2019-02-01 PROCEDURE — 71260 CT THORAX DX C+: CPT | Mod: 26

## 2019-02-01 PROCEDURE — 74177 CT ABD & PELVIS W/CONTRAST: CPT | Mod: 26

## 2019-02-01 PROCEDURE — 71260 CT THORAX DX C+: CPT

## 2019-02-01 PROCEDURE — 74177 CT ABD & PELVIS W/CONTRAST: CPT

## 2019-02-12 ENCOUNTER — APPOINTMENT (OUTPATIENT)
Dept: RADIATION ONCOLOGY | Facility: CLINIC | Age: 84
End: 2019-02-12
Payer: MEDICARE

## 2019-02-12 PROCEDURE — 99024 POSTOP FOLLOW-UP VISIT: CPT

## 2019-02-12 NOTE — HISTORY OF PRESENT ILLNESS
[FreeTextEntry1] : 2/12/19-\par Ms. Gallardo returns for consent signing for radiation therapy to the LEFT breast.\par \par Today, she XXX \par \par ONCOLOGIC HISTORY (1/18/19)\par Ms. Astrid Gallardo is an 83F, never smoker, referred by Dr. Beasley for consideration of radiation therapy for LEFT breast cancer. \par \par She experienced a fall on 8/22/18 and subsequently palpated a left breast mass a week later and followed up in [facility] ED. She notes she did not have a PCP at the time as she had been generally healthy. Denies any skin changes or nipple discharge at the time. On 9/20/18, she underwent LEFT breast U/S, which revealed the following:\par At the 1:00 position, 4cmFN a 1.8 x 1.6 x 1.6 cm hypo/isoechoic mass with ill defined borders demonstrating internal vascularity; BI-RADS 5. A full mammogram evaluation with likely biopsy was recommended. She saw Dr. Bear for initial eval on 9/26/18, was noted to have skin dimpling.\par \par On 10/2/18, she underwent US guided biopsy of the mass. 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 Radiology was subsequently asked to comment on the RIGHT breast, and it showed the following:\par Multiple scattered and grouped calcifications present in the right breast. Some of the calcifications have a linear distribution pattern. \par Recommendation for stereotactic biopsy of the most suspicious osseous lesions in the right breast.\par \par On 11/5/18, she had stereotactic core biopsy (right mid lower breast) with pathology revealing the following:\par - Breast tissue with duct ectasia, cysts and usual ductal hyperplasia\par - Calcifications associated with benign epithelium and stroma\par \par She underwent LEFT mastectomy and ALND on 11/19/18.  Final pathology revealed the following: \par 1. Left sentinel lymph node, excision: - Metastatic carcinoma involving 1 lymph node with extranodal extension spanning 5 mm (1/1).  \par 2. Left breast, mastectomy:\par - Invasive duct carcinoma, well-differentiated with prominent central sclerosis. 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 DCIS is noted only adjacent to invasive tumor.\par -All margins negative for invasive and in-situ duct carcinoma with greater than 1 cm clearance.\par - Biopsy site changes.\par 3. Medial margin, excision: - Breast tissue negative for carcinoma.\par 4. Left axillary lymph node dissection:\par - 17 lymph nodes negative for tumor.\par - Focus of invasive duct carcinoma present within adjacent adipose tissue. \par \par Note: The largest contiguous lymph node metastasis in part 1 measures 3 mm in greatest dimension.  A focus of extranodal\par extension spanning 5mm is noted in part 1.  In part 4, A focus of carcinoma spanning 11 mm is noted within fat.  The tumor in parts 1 and 4 are morphologically similar to the tumor in part 2.\par \par She last saw Dr. Bear on 11/26/18, at which time she was advised to see Radiation Oncology and Medical Oncology. She had 2 JASON drains, second one removed approx 3 weeks post-op. She saw Dr. Beasley on 1/4/19, at which treatment options were discussed to include RT. Oncotype testing was arranged (result pending), and patient will see Dr. Beasley again on 1/25/19. \par \par Today, she reports she is feeling well, although understandably upset about her diagnosis. She denies breast pain, breast or UE edema, fever, chills, fatigue, CP, SOB, or any other c/o. \par               \par Family history is negative for breast or ovarian cancer.

## 2019-02-12 NOTE — PHYSICAL EXAM
[Range of Motion to Joints] : range of motion to joints [Normal] : oriented to person, place and time, the affect was normal, the mood was normal and not anxious [de-identified] : s/p left mastectomy, lateral incision left breast, well healed, loose skin.  [de-identified] : s/p left mastectomy, lateral incision left breast, well healed, loose skin.

## 2019-02-12 NOTE — REVIEW OF SYSTEMS
[Patient Intake Form Reviewed] : Patient intake form was reviewed [Negative] : Allergic/Immunologic [de-identified] : s/p Left mastectomy, incision well healed [de-identified] : "ticked off"

## 2019-03-13 VITALS
SYSTOLIC BLOOD PRESSURE: 145 MMHG | OXYGEN SATURATION: 98 % | HEIGHT: 66 IN | TEMPERATURE: 98.1 F | WEIGHT: 165 LBS | HEART RATE: 92 BPM | BODY MASS INDEX: 26.52 KG/M2 | DIASTOLIC BLOOD PRESSURE: 86 MMHG | RESPIRATION RATE: 18 BRPM

## 2019-03-14 NOTE — PHYSICAL EXAM
[de-identified] : s/p left mastectomy, lateral incision left breast, well healed, loose skin.  [de-identified] : LUE edema [de-identified] : s/p left mastectomy, lateral incision left breast, well healed, loose skin. No erythema or hyperpigmentation noted.

## 2019-03-14 NOTE — HISTORY OF PRESENT ILLNESS
[FreeTextEntry1] : OTV- 3/6/19- Ms. Keyes has completed 1000/ 5000 cGy to the LEFT CW/ reg nodes/. Today she reports she is feeling well. Denies any beast pain or soreness, skin irritation, CP, SOB, dysphagia. Energy level is good. She continues to report LUE edema, no pain. She had a consult with OT for lymphedema therapy, but is looking for further recommendations for an OT.   \par \par \par ONCOLOGIC HISTORY (1/18/19)\par Ms. Astrid Gallardo is an 83F, never smoker, referred by Dr. Beasley for consideration of radiation therapy for LEFT breast cancer. \par \par She experienced a fall on 8/22/18 and subsequently palpated a left breast mass a week later and followed up in [facility] ED. She notes she did not have a PCP at the time as she had been generally healthy. Denies any skin changes or nipple discharge at the time. On 9/20/18, she underwent LEFT breast U/S, which revealed the following:\par At the 1:00 position, 4cmFN a 1.8 x 1.6 x 1.6 cm hypo/isoechoic mass with ill defined borders demonstrating internal vascularity; BI-RADS 5. A full mammogram evaluation with likely biopsy was recommended. She saw Dr. Bear for initial eval on 9/26/18, was noted to have skin dimpling.\par \par On 10/2/18, she underwent US guided biopsy of the mass. 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 Radiology was subsequently asked to comment on the RIGHT breast, and it showed the following:\par Multiple scattered and grouped calcifications present in the right breast. Some of the calcifications have a linear distribution pattern. \par Recommendation for stereotactic biopsy of the most suspicious osseous lesions in the right breast.\par \par On 11/5/18, she had stereotactic core biopsy (right mid lower breast) with pathology revealing the following:\par - Breast tissue with duct ectasia, cysts and usual ductal hyperplasia\par - Calcifications associated with benign epithelium and stroma\par \par She underwent LEFT mastectomy and ALND on 11/19/18.  Final pathology revealed the following: \par 1. Left sentinel lymph node, excision: - Metastatic carcinoma involving 1 lymph node with extranodal extension spanning 5 mm (1/1).  \par 2. Left breast, mastectomy:\par - Invasive duct carcinoma, well-differentiated with prominent central sclerosis. 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 DCIS is noted only adjacent to invasive tumor.\par -All margins negative for invasive and in-situ duct carcinoma with greater than 1 cm clearance.\par - Biopsy site changes.\par 3. Medial margin, excision: - Breast tissue negative for carcinoma.\par 4. Left axillary lymph node dissection:\par - 17 lymph nodes negative for tumor.\par - Focus of invasive duct carcinoma present within adjacent adipose tissue. \par \par Note: The largest contiguous lymph node metastasis in part 1 measures 3 mm in greatest dimension.  A focus of extranodal\par extension spanning 5mm is noted in part 1.  In part 4, A focus of carcinoma spanning 11 mm is noted within fat.  The tumor in parts 1 and 4 are morphologically similar to the tumor in part 2.\par \par She last saw Dr. Bear on 11/26/18, at which time she was advised to see Radiation Oncology and Medical Oncology. She had 2 JASON drains, second one removed approx 3 weeks post-op. She saw Dr. Beasley on 1/4/19, at which treatment options were discussed to include RT. Oncotype testing was arranged (result pending), and patient will see Dr. Beasley again on 1/25/19. \par \par Today, she reports she is feeling well, although understandably upset about her diagnosis. She denies breast pain, breast or UE edema, fever, chills, fatigue, CP, SOB, or any other c/o. \par               \par Family history is negative for breast or ovarian cancer.

## 2019-03-25 NOTE — PHYSICAL EXAM
[Range of Motion to Joints] : range of motion to joints [Normal] : oriented to person, place and time, the affect was normal, the mood was normal and not anxious [de-identified] : s/p left mastectomy, lateral incision left breast, well healed, loose skin. No erythema or hyperpigmentation noted. LUE edema, stable. [de-identified] : LUE edema [de-identified] : s/p left mastectomy, lateral incision left breast, well healed, loose skin. No erythema or hyperpigmentation noted.

## 2019-03-25 NOTE — DISEASE MANAGEMENT
[Pathological] : TNM Stage: p [IB] : IB [TTNM] : 2 [NTNM] : 1a [MTNM] : 0 [de-identified] : 2000 cGy [de-identified] : 5000 cGy [de-identified] : left CW/ reg nodes

## 2019-03-25 NOTE — REVIEW OF SYSTEMS
[Fatigue: Grade 0] : Fatigue: Grade 0 [Breast Pain: Grade 0] : Breast Pain: Grade 0 [Skin Hyperpigmentation: Grade 0] : Skin Hyperpigmentation: Grade 0 [Patient Intake Form Reviewed] : Patient intake form was reviewed [Dermatitis Radiation: Grade 0] : Dermatitis Radiation: Grade 0 [Negative] : Allergic/Immunologic [de-identified] : s/p Left mastectomy, incision well healed [de-identified] : "ticked off"

## 2019-03-25 NOTE — HISTORY OF PRESENT ILLNESS
[FreeTextEntry1] : OTV- 3/20/19- OTV- Ms. Keyes has completed 2000/ 5000 cGy to the LEFT CW/ reg nodes/. Today she reports she feels well, no changes since last week. She denies breast pain/ soreness, skin irritation, CP, SOB, dysphagia. Using Aquaphor daily. Energy level is good. She called Occupational Therapist as advised last week for LUE edema, will be seeing OT soon, although she would [place] an [place] side as [place] is more convenient. \par \par OTV- 3/13/19- Ms. Keyes has completed 1000/ 5000 cGy to the LEFT CW/ reg nodes/. Today she reports she is feeling well. Denies any beast pain or soreness, skin irritation, CP, SOB, dysphagia. Energy level is good. She continues to report LUE edema, no pain. She had a consult with OT for lymphedema therapy, but is looking for further recommendations for an OT.   \par \par \par ONCOLOGIC HISTORY (1/18/19)\par Ms. Astrid Gallardo is an 83F, never smoker, referred by Dr. Beasley for consideration of radiation therapy for LEFT breast cancer. \par \par She experienced a fall on 8/22/18 and subsequently palpated a left breast mass a week later and followed up in [facility] ED. She notes she did not have a PCP at the time as she had been generally healthy. Denies any skin changes or nipple discharge at the time. On 9/20/18, she underwent LEFT breast U/S, which revealed the following:\par At the 1:00 position, 4cmFN a 1.8 x 1.6 x 1.6 cm hypo/isoechoic mass with ill defined borders demonstrating internal vascularity; BI-RADS 5. A full mammogram evaluation with likely biopsy was recommended. She saw Dr. Bear for initial eval on 9/26/18, was noted to have skin dimpling.\par \par On 10/2/18, she underwent US guided biopsy of the mass. 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 Radiology was subsequently asked to comment on the RIGHT breast, and it showed the following:\par Multiple scattered and grouped calcifications present in the right breast. Some of the calcifications have a linear distribution pattern. \par Recommendation for stereotactic biopsy of the most suspicious osseous lesions in the right breast.\par \par On 11/5/18, she had stereotactic core biopsy (right mid lower breast) with pathology revealing the following:\par - Breast tissue with duct ectasia, cysts and usual ductal hyperplasia\par - Calcifications associated with benign epithelium and stroma\par \par She underwent LEFT mastectomy and ALND on 11/19/18.  Final pathology revealed the following: \par 1. Left sentinel lymph node, excision: - Metastatic carcinoma involving 1 lymph node with extranodal extension spanning 5 mm (1/1).  \par 2. Left breast, mastectomy:\par - Invasive duct carcinoma, well-differentiated with prominent central sclerosis. 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 DCIS is noted only adjacent to invasive tumor.\par -All margins negative for invasive and in-situ duct carcinoma with greater than 1 cm clearance.\par - Biopsy site changes.\par 3. Medial margin, excision: - Breast tissue negative for carcinoma.\par 4. Left axillary lymph node dissection:\par - 17 lymph nodes negative for tumor.\par - Focus of invasive duct carcinoma present within adjacent adipose tissue. \par \par Note: The largest contiguous lymph node metastasis in part 1 measures 3 mm in greatest dimension.  A focus of extranodal\par extension spanning 5mm is noted in part 1.  In part 4, A focus of carcinoma spanning 11 mm is noted within fat.  The tumor in parts 1 and 4 are morphologically similar to the tumor in part 2.\par \par She last saw Dr. Bear on 11/26/18, at which time she was advised to see Radiation Oncology and Medical Oncology. She had 2 JASON drains, second one removed approx 3 weeks post-op. She saw Dr. Beasley on 1/4/19, at which treatment options were discussed to include RT. Oncotype testing was arranged (result pending), and patient will see Dr. Beasley again on 1/25/19. \par \par Today, she reports she is feeling well, although understandably upset about her diagnosis. She denies breast pain, breast or UE edema, fever, chills, fatigue, CP, SOB, or any other c/o. \par               \par Family history is negative for breast or ovarian cancer.

## 2019-04-01 NOTE — DISEASE MANAGEMENT
[Pathological] : TNM Stage: p [IB] : IB [TTNM] : 2 [NTNM] : 1a [MTNM] : 0 [de-identified] : 2605cGy [de-identified] : 5000 cGy [de-identified] : left CW/ reg nodes

## 2019-04-01 NOTE — REVIEW OF SYSTEMS
[Fatigue: Grade 0] : Fatigue: Grade 0 [Breast Pain: Grade 0] : Breast Pain: Grade 0 [Skin Hyperpigmentation: Grade 0] : Skin Hyperpigmentation: Grade 0 [Dermatitis Radiation: Grade 0] : Dermatitis Radiation: Grade 0 [Patient Intake Form Reviewed] : Patient intake form was reviewed [Negative] : Allergic/Immunologic [de-identified] : s/p Left mastectomy, incision well healed [de-identified] : "ticked off"

## 2019-04-01 NOTE — PHYSICAL EXAM
[Range of Motion to Joints] : range of motion to joints [Normal] : oriented to person, place and time, the affect was normal, the mood was normal and not anxious [de-identified] : s/p left mastectomy, lateral incision left breast, well healed, loose skin. No erythema or hyperpigmentation noted. LUE edema, stable. [de-identified] : LUE edema [de-identified] : s/p left mastectomy, lateral incision left breast, well healed, loose skin. No erythema or hyperpigmentation noted.

## 2019-04-01 NOTE — HISTORY OF PRESENT ILLNESS
[FreeTextEntry1] : 3/25/19 OTV- Ms. Keyes has completed 2000/ 5000 cGy to the LEFT CW/ reg nodes.  She denies breast pain/ soreness, skin irritation, CP, SOB, dysphagia,Using Aquaphor daily. Energy level is good. She was advisded to see OT  for LUE edema, \par \par OTV- 3/20/19- OTV- Ms. Keyes has completed 2000/ 5000 cGy to the LEFT CW/ reg nodes/. Today she reports she feels well, no changes since last week. She denies breast pain/ soreness, skin irritation, CP, SOB, dysphagia. Using Aquaphor daily. Energy level is good. She called Occupational Therapist as advised last week for LUE edema, will be seeing OT soon, although she would [place] an OT [place] side as [place] is more convenient. \par \par OTV- 3/13/19- Ms. Keyes has completed 1000/ 5000 cGy to the LEFT CW/ reg nodes/. Today she reports she is feeling well. Denies any beast pain or soreness, skin irritation, CP, SOB, dysphagia. Energy level is good. She continues to report LUE edema, no pain. She had a consult with OT for lymphedema therapy, but is looking for further recommendations for an OT.   \par \par \par ONCOLOGIC HISTORY (1/18/19)\par Ms. Astrid Gallardo is an 83F, never smoker, referred by Dr. Beasley for consideration of radiation therapy for LEFT breast cancer. \par \par She experienced a fall on 8/22/18 and subsequently palpated a left breast mass a week later and followed up in [facility] ED. She notes she did not have a PCP at the time as she had been generally healthy. Denies any skin changes or nipple discharge at the time. On 9/20/18, she underwent LEFT breast U/S, which revealed the following:\par At the 1:00 position, 4cmFN a 1.8 x 1.6 x 1.6 cm hypo/isoechoic mass with ill defined borders demonstrating internal vascularity; BI-RADS 5. A full mammogram evaluation with likely biopsy was recommended. She saw Dr. Bear for initial eval on 9/26/18, was noted to have skin dimpling.\par \par On 10/2/18, she underwent US guided biopsy of the mass. 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 Radiology was subsequently asked to comment on the RIGHT breast, and it showed the following:\par Multiple scattered and grouped calcifications present in the right breast. Some of the calcifications have a linear distribution pattern. \par Recommendation for stereotactic biopsy of the most suspicious osseous lesions in the right breast.\par \par On 11/5/18, she had stereotactic core biopsy (right mid lower breast) with pathology revealing the following:\par - Breast tissue with duct ectasia, cysts and usual ductal hyperplasia\par - Calcifications associated with benign epithelium and stroma\par \par She underwent LEFT mastectomy and ALND on 11/19/18.  Final pathology revealed the following: \par 1. Left sentinel lymph node, excision: - Metastatic carcinoma involving 1 lymph node with extranodal extension spanning 5 mm (1/1).  \par 2. Left breast, mastectomy:\par - Invasive duct carcinoma, well-differentiated with prominent central sclerosis. 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 DCIS is noted only adjacent to invasive tumor.\par -All margins negative for invasive and in-situ duct carcinoma with greater than 1 cm clearance.\par - Biopsy site changes.\par 3. Medial margin, excision: - Breast tissue negative for carcinoma.\par 4. Left axillary lymph node dissection:\par - 17 lymph nodes negative for tumor.\par - Focus of invasive duct carcinoma present within adjacent adipose tissue. \par \par Note: The largest contiguous lymph node metastasis in part 1 measures 3 mm in greatest dimension.  A focus of extranodal\par extension spanning 5mm is noted in part 1.  In part 4, A focus of carcinoma spanning 11 mm is noted within fat.  The tumor in parts 1 and 4 are morphologically similar to the tumor in part 2.\par \par She last saw Dr. Bear on 11/26/18, at which time she was advised to see Radiation Oncology and Medical Oncology. She had 2 JASON drains, second one removed approx 3 weeks post-op. She saw Dr. Beasley on 1/4/19, at which treatment options were discussed to include RT. Oncotype testing was arranged (result pending), and patient will see Dr. Beasley again on 1/25/19. \par \par Today, she reports she is feeling well, although understandably upset about her diagnosis. She denies breast pain, breast or UE edema, fever, chills, fatigue, CP, SOB, or any other c/o. \par               \par Family history is negative for breast or ovarian cancer.

## 2019-04-03 VITALS — OXYGEN SATURATION: 99 % | RESPIRATION RATE: 16 BRPM | HEART RATE: 88 BPM

## 2019-04-04 ENCOUNTER — APPOINTMENT (OUTPATIENT)
Dept: SURGERY | Facility: CLINIC | Age: 84
End: 2019-04-04
Payer: MEDICARE

## 2019-04-04 VITALS
HEART RATE: 70 BPM | DIASTOLIC BLOOD PRESSURE: 77 MMHG | WEIGHT: 160 LBS | HEIGHT: 66 IN | SYSTOLIC BLOOD PRESSURE: 126 MMHG | BODY MASS INDEX: 25.71 KG/M2

## 2019-04-04 DIAGNOSIS — I89.0 LYMPHEDEMA, NOT ELSEWHERE CLASSIFIED: ICD-10-CM

## 2019-04-04 PROCEDURE — 99213 OFFICE O/P EST LOW 20 MIN: CPT

## 2019-04-04 NOTE — PAST MEDICAL HISTORY
[Postmenopausal] : The patient is postmenopausal [Menarche Age ____] : age at menarche was [unfilled] [Total Preg ___] : G[unfilled] [Menopause Age____] : age at menopause was [unfilled] [unknown] : the patient is unsure of the date of her LMP [History of Hormone Replacement Treatment] : has no history of hormone replacement treatment [FreeTextEntry5] : N/A [FreeTextEntry6] : N/A [FreeTextEntry7] : N/A [FreeTextEntry8] : N/A

## 2019-04-04 NOTE — HISTORY OF PRESENT ILLNESS
[FreeTextEntry1] : 4/3/19- OTV- Ms. Keyes has completed 4000/ 5000 cGy to the LEFT CW/ reg nodes. Today she reports she feels well. She notes mild erythema to left breast/ chest. She denies breast pain/ soreness, skin irritation, CP, SOB, dysphagia. Using Aquaphor daily. Energy level is good. She saw OT for initial eval and will continue therapy sessions for LUE edema. \par \par 3/25/19 OTV- Ms. Gallardo has completed 2600/ 5000 cGy to the LEFT CW/ reg nodes.  She denies breast pain/ soreness, skin irritation, CP, SOB, dysphagia,Using Aquaphor daily. Energy level is good. She was advised to see OT  for LUE edema, \par \par OTV- 3/20/19- OTV- Ms. Gallardo has completed 2000/ 5000 cGy to the LEFT CW/ reg nodes/. Today she reports she feels well, no changes since last week. She denies breast pain/ soreness, skin irritation, CP, SOB, dysphagia. Using Aquaphor daily. Energy level is good. She called Occupational Therapist as advised last week for LUE edema, will be seeing OT soon, although she would [place] an OT [place] side as [place] is more convenient. \par \par OTV- 3/13/19- Ms. Gallardo has completed 1000/ 5000 cGy to the LEFT CW/ reg nodes/. Today she reports she is feeling well. Denies any beast pain or soreness, skin irritation, CP, SOB, dysphagia. Energy level is good. She continues to report LUE edema, no pain. She had a consult with OT for lymphedema therapy, but is looking for further recommendations for an OT.   \par \par \par ONCOLOGIC HISTORY (1/18/19)\par Ms. Astird Gallardo is an 83F, never smoker, referred by Dr. Beasley for consideration of radiation therapy for LEFT breast cancer. \par \par She experienced a fall on 8/22/18 and subsequently palpated a left breast mass a week later and followed up in [facility] ED. She notes she did not have a PCP at the time as she had been generally healthy. Denies any skin changes or nipple discharge at the time. On 9/20/18, she underwent LEFT breast U/S, which revealed the following:\par At the 1:00 position, 4cmFN a 1.8 x 1.6 x 1.6 cm hypo/isoechoic mass with ill defined borders demonstrating internal vascularity; BI-RADS 5. A full mammogram evaluation with likely biopsy was recommended. She saw Dr. Bear for initial eval on 9/26/18, was noted to have skin dimpling.\par \par On 10/2/18, she underwent US guided biopsy of the mass. 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 Radiology was subsequently asked to comment on the RIGHT breast, and it showed the following:\par Multiple scattered and grouped calcifications present in the right breast. Some of the calcifications have a linear distribution pattern. \par Recommendation for stereotactic biopsy of the most suspicious osseous lesions in the right breast.\par \par On 11/5/18, she had stereotactic core biopsy (right mid lower breast) with pathology revealing the following:\par - Breast tissue with duct ectasia, cysts and usual ductal hyperplasia\par - Calcifications associated with benign epithelium and stroma\par \par She underwent LEFT mastectomy and ALND on 11/19/18.  Final pathology revealed the following: \par 1. Left sentinel lymph node, excision: - Metastatic carcinoma involving 1 lymph node with extranodal extension spanning 5 mm (1/1).  \par 2. Left breast, mastectomy:\par - Invasive duct carcinoma, well-differentiated with prominent central sclerosis. 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 DCIS is noted only adjacent to invasive tumor.\par -All margins negative for invasive and in-situ duct carcinoma with greater than 1 cm clearance.\par - Biopsy site changes.\par 3. Medial margin, excision: - Breast tissue negative for carcinoma.\par 4. Left axillary lymph node dissection:\par - 17 lymph nodes negative for tumor.\par - Focus of invasive duct carcinoma present within adjacent adipose tissue. \par \par Note: The largest contiguous lymph node metastasis in part 1 measures 3 mm in greatest dimension.  A focus of extranodal\par extension spanning 5mm is noted in part 1.  In part 4, A focus of carcinoma spanning 11 mm is noted within fat.  The tumor in parts 1 and 4 are morphologically similar to the tumor in part 2.\par \par She last saw Dr. Bear on 11/26/18, at which time she was advised to see Radiation Oncology and Medical Oncology. She had 2 JASON drains, second one removed approx 3 weeks post-op. She saw Dr. Beasley on 1/4/19, at which treatment options were discussed to include RT. Oncotype testing was arranged (result pending), and patient will see Dr. Beasley again on 1/25/19. \par \par Today, she reports she is feeling well, although understandably upset about her diagnosis. She denies breast pain, breast or UE edema, fever, chills, fatigue, CP, SOB, or any other c/o. \par               \par Family history is negative for breast or ovarian cancer.

## 2019-04-04 NOTE — REVIEW OF SYSTEMS
[Fatigue: Grade 0] : Fatigue: Grade 0 [Breast Pain: Grade 0] : Breast Pain: Grade 0 [Skin Hyperpigmentation: Grade 0] : Skin Hyperpigmentation: Grade 0 [Patient Intake Form Reviewed] : Patient intake form was reviewed [Negative] : Allergic/Immunologic [Dermatitis Radiation: Grade 1 - Faint erythema or dry desquamation] : Dermatitis Radiation: Grade 1 - Faint erythema or dry desquamation [de-identified] : s/p Left mastectomy, incision well healed

## 2019-04-04 NOTE — PHYSICAL EXAM
[Range of Motion to Joints] : range of motion to joints [Normal] : oriented to person, place and time, the affect was normal, the mood was normal and not anxious [de-identified] : s/p left mastectomy, lateral incision left breast, well healed, loose skin. Faint erythema noted to left chest/ breast. No breakdown or desquamation noted. LUE edema, stable. [de-identified] : LUE edema [de-identified] : s/p left mastectomy, lateral incision left breast, well healed, loose skin. Faint erythema noted to left chest/ breast. No breakdown or desquamation noted.

## 2019-04-04 NOTE — REVIEW OF SYSTEMS
[Negative] : Heme/Lymph [As Noted in HPI] : as noted in HPI [Limb Swelling] : limb swelling [FreeTextEntry9] : Left upper extremity lymphedema

## 2019-04-04 NOTE — DISEASE MANAGEMENT
[Pathological] : TNM Stage: p [IB] : IB [TTNM] : 2 [NTNM] : 1a [MTNM] : 0 [de-identified] : 4000 cGy [de-identified] : 5000 cGy [de-identified] : left CW/ reg nodes

## 2019-04-18 NOTE — DISEASE MANAGEMENT
[Pathological] : TNM Stage: p [IB] : IB [TTNM] : 2 [NTNM] : 1a [MTNM] : 0 [de-identified] : 5000 cGy [de-identified] : 5000 cGy [de-identified] : left CW/ reg nodes

## 2019-04-18 NOTE — HISTORY OF PRESENT ILLNESS
[FreeTextEntry1] : 4/10/19- OTV- Ms. Gallardo has completed 5000/ 5000 cGy to the LEFT CW/ reg nodes. Today she reports she feels generally well. She notes increased erythema to left breast/ chest, but denies pain/ soreness, skin irritation/ itching. Denies chest wall pain, SOB, dysphagia. Using Aquaphor daily. Energy level is good. Plans to continue OT/ lymphedema therapy. She has started to use a compression sleeve to LUE for the edema.  She saw breast surgeon Dr. Navarrete on 4/4/19; plan is for mammogram in May and RTC 3 months. \par \par \par 4/3/19- OTV- Ms. Gallardo has completed 4000/ 5000 cGy to the LEFT CW/ reg nodes. Today she reports she feels well. She notes mild erythema to left breast/ chest. She denies breast pain/ soreness, skin irritation, CP, SOB, dysphagia. Using Aquaphor daily. Energy level is good. She saw OT for initial eval and will continue therapy sessions for LUE edema. \par \par 3/25/19 OTV- Ms. Gallardo has completed 2600/ 5000 cGy to the LEFT CW/ reg nodes.  She denies breast pain/ soreness, skin irritation, CP, SOB, dysphagia,Using Aquaphor daily. Energy level is good. She was advised to see OT  for LUE edema, \par \par OTV- 3/20/19- OTV- Ms. Gallardo has completed 2000/ 5000 cGy to the LEFT CW/ reg nodes/. Today she reports she feels well, no changes since last week. She denies breast pain/ soreness, skin irritation, CP, SOB, dysphagia. Using Aquaphor daily. Energy level is good. She called Occupational Therapist as advised last week for LUE edema, will be seeing OT soon, although she would prefer an OT [place] the [place] side as [place] is more convenient. \par \par OTV- 3/13/19- Ms. Gallardo has completed 1000/ 5000 cGy to the LEFT CW/ reg nodes/. Today she reports she is feeling well. Denies any beast pain or soreness, skin irritation, CP, SOB, dysphagia. Energy level is good. She continues to report LUE edema, no pain. She had a consult with OT for lymphedema therapy, but is looking for further recommendations for an OT.   \par \par \par ONCOLOGIC HISTORY (1/18/19)\par Ms. Astrid Gallardo is an 83F, never smoker, referred by Dr. Beasley for consideration of radiation therapy for LEFT breast cancer. \par \par She experienced a fall on 8/22/18 and subsequently palpated a left breast mass a week later and followed up in [facility] ED. She notes she did not have a PCP at the time as she had been generally healthy. Denies any skin changes or nipple discharge at the time. On 9/20/18, she underwent LEFT breast U/S, which revealed the following:\par At the 1:00 position, 4cmFN a 1.8 x 1.6 x 1.6 cm hypo/isoechoic mass with ill defined borders demonstrating internal vascularity; BI-RADS 5. A full mammogram evaluation with likely biopsy was recommended. She saw Dr. Bear for initial eval on 9/26/18, was noted to have skin dimpling.\par \par On 10/2/18, she underwent US guided biopsy of the mass. 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 Radiology was subsequently asked to comment on the RIGHT breast, and it showed the following:\par Multiple scattered and grouped calcifications present in the right breast. Some of the calcifications have a linear distribution pattern. \par Recommendation for stereotactic biopsy of the most suspicious osseous lesions in the right breast.\par \par On 11/5/18, she had stereotactic core biopsy (right mid lower breast) with pathology revealing the following:\par - Breast tissue with duct ectasia, cysts and usual ductal hyperplasia\par - Calcifications associated with benign epithelium and stroma\par \par She underwent LEFT mastectomy and ALND on 11/19/18.  Final pathology revealed the following: \par 1. Left sentinel lymph node, excision: - Metastatic carcinoma involving 1 lymph node with extranodal extension spanning 5 mm (1/1).  \par 2. Left breast, mastectomy:\par - Invasive duct carcinoma, well-differentiated with prominent central sclerosis. 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 DCIS is noted only adjacent to invasive tumor.\par -All margins negative for invasive and in-situ duct carcinoma with greater than 1 cm clearance.\par - Biopsy site changes.\par 3. Medial margin, excision: - Breast tissue negative for carcinoma.\par 4. Left axillary lymph node dissection:\par - 17 lymph nodes negative for tumor.\par - Focus of invasive duct carcinoma present within adjacent adipose tissue. \par \par Note: The largest contiguous lymph node metastasis in part 1 measures 3 mm in greatest dimension.  A focus of extranodal\par extension spanning 5mm is noted in part 1.  In part 4, A focus of carcinoma spanning 11 mm is noted within fat.  The tumor in parts 1 and 4 are morphologically similar to the tumor in part 2.\par \par She last saw Dr. Bear on 11/26/18, at which time she was advised to see Radiation Oncology and Medical Oncology. She had 2 JASON drains, second one removed approx 3 weeks post-op. She saw Dr. Beasley on 1/4/19, at which treatment options were discussed to include RT. Oncotype testing was arranged (result pending), and patient will see Dr. Beasley again on 1/25/19. \par \par Today, she reports she is feeling well, although understandably upset about her diagnosis. She denies breast pain, breast or UE edema, fever, chills, fatigue, CP, SOB, or any other c/o. \par               \par Family history is negative for breast or ovarian cancer.

## 2019-04-18 NOTE — REVIEW OF SYSTEMS
[Fatigue: Grade 0] : Fatigue: Grade 0 [Breast Pain: Grade 0] : Breast Pain: Grade 0 [Skin Hyperpigmentation: Grade 0] : Skin Hyperpigmentation: Grade 0 [Patient Intake Form Reviewed] : Patient intake form was reviewed [Negative] : Allergic/Immunologic [Dermatitis Radiation: Grade 2 - Moderate to brisk erythema; patchy moist desquamation, mostly confined to skin folds and creases; moderate edema] : Dermatitis Radiation: Grade 2 - Moderate to brisk erythema; patchy moist desquamation, mostly confined to skin folds and creases; moderate edema [de-identified] : s/p Left mastectomy, incision well healed

## 2019-04-18 NOTE — PHYSICAL EXAM
[Range of Motion to Joints] : range of motion to joints [Normal] : oriented to person, place and time, the affect was normal, the mood was normal and not anxious [de-identified] : s/p left mastectomy, lateral incision left breast, well healed, loose skin. Grade 2 dermatitis noted to left chest/ breast. No breakdown noted. LUE edema, stable. [de-identified] : LUE edema [de-identified] : s/p left mastectomy, lateral incision left breast, well healed, loose skin. Grade 2 dermatitis noted to left chest/ breast. No breakdown noted.

## 2019-05-15 ENCOUNTER — APPOINTMENT (OUTPATIENT)
Dept: RADIATION ONCOLOGY | Facility: CLINIC | Age: 84
End: 2019-05-15
Payer: MEDICARE

## 2019-05-15 VITALS — WEIGHT: 164 LBS | BODY MASS INDEX: 26.47 KG/M2 | RESPIRATION RATE: 18 BRPM | HEART RATE: 74 BPM

## 2019-05-15 DIAGNOSIS — C50.912 MALIGNANT NEOPLASM OF UNSPECIFIED SITE OF LEFT FEMALE BREAST: ICD-10-CM

## 2019-05-15 PROCEDURE — 99024 POSTOP FOLLOW-UP VISIT: CPT

## 2019-05-16 NOTE — DISEASE MANAGEMENT
[Pathological] : TNM Stage: p [IB] : IB [TTNM] : 2 [NTNM] : 1a [de-identified] : left CW/ reg nodes [MTNM] : 0

## 2019-05-16 NOTE — HISTORY OF PRESENT ILLNESS
[FreeTextEntry1] : Ms. 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 She tolerated her treatment well and did not develop any grade 3 or higher acute toxicities as a result of treatment.\par \par 5/15/19- PTE\par Ms. Gallardo returns for PTE. She last saw Dr. Navarrete on 4/4/19; plan is for mammogram in May and RTC 3 months. Patient states she will schedule imaging for next month. She last saw Dr. Beasley before she began RT and states she will make a follow up appointment for June. Today, she reports she is feeling well. She has been doing OT/ lymphedema therapy, uses compression sleeve to LUE and notes edema has improved. She also notes improvement to left chest skin. Denies breast/ CW pain or soreness, CP, SOB, dysphagia, odynophagia, fever, chills, fatigue, skin irritation, or any other c/o. \par  \par \par __________________________________________________________\par ONCOLOGIC HISTORY (1/18/19)\par Ms. Astrid Gallardo is an 83F, never smoker, referred by Dr. Beasley for consideration of radiation therapy for LEFT breast cancer. \par \par She experienced a fall on 8/22/18 and subsequently palpated a left breast mass a week later and followed up in [facility] ED. She notes she did not have a PCP at the time as she had been generally healthy. Denies any skin changes or nipple discharge at the time. On 9/20/18, she underwent LEFT breast U/S, which revealed the following:\par At the 1:00 position, 4cmFN a 1.8 x 1.6 x 1.6 cm hypo/isoechoic mass with ill defined borders demonstrating internal vascularity; BI-RADS 5. A full mammogram evaluation with likely biopsy was recommended. She saw Dr. Bear for initial eval on 9/26/18, was noted to have skin dimpling.\par \par On 10/2/18, she underwent US guided biopsy of the mass. Pathology showed invasive duct carcinoma, well to moderately differentiated with calcifications, largest focus in the core measuring 1.3 cm, ER/ NH positive, HER-2 equivocal, FISH negative. Radiology was subsequently asked to comment on the RIGHT breast, and it showed the following:\par Multiple scattered and grouped calcifications present in the right breast. Some of the calcifications have a linear distribution pattern. \par Recommendation for stereotactic biopsy of the most suspicious osseous lesions in the right breast.\par \par On 11/5/18, she had stereotactic core biopsy (right mid lower breast) with pathology revealing the following:\par - Breast tissue with duct ectasia, cysts and usual ductal hyperplasia\par - Calcifications associated with benign epithelium and stroma\par \par She underwent LEFT mastectomy and ALND on 11/19/18.  Final pathology revealed the following: \par 1. Left sentinel lymph node, excision: - Metastatic carcinoma involving 1 lymph node with extranodal extension spanning 5 mm (1/1).  \par 2. Left breast, mastectomy:\par - Invasive duct carcinoma, well-differentiated with prominent central sclerosis. 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 DCIS is noted only adjacent to invasive tumor.\par -All margins negative for invasive and in-situ duct carcinoma with greater than 1 cm clearance.\par - Biopsy site changes.\par 3. Medial margin, excision: - Breast tissue negative for carcinoma.\par 4. Left axillary lymph node dissection:\par - 17 lymph nodes negative for tumor.\par - Focus of invasive duct carcinoma present within adjacent adipose tissue. \par \par Note: The largest contiguous lymph node metastasis in part 1 measures 3 mm in greatest dimension.  A focus of extranodal\par extension spanning 5mm is noted in part 1.  In part 4, A focus of carcinoma spanning 11 mm is noted within fat.  The tumor in parts 1 and 4 are morphologically similar to the tumor in part 2.\par \par She last saw Dr. Bear on 11/26/18, at which time she was advised to see Radiation Oncology and Medical Oncology. She had 2 JASON drains, second one removed approx 3 weeks post-op. She saw Dr. Beasley on 1/4/19, at which treatment options were discussed to include RT. Oncotype testing was arranged (result pending), and patient will see Dr. Beasley again on 1/25/19. \par \par Today, she reports she is feeling well, although understandably upset about her diagnosis. She denies breast pain, breast or UE edema, fever, chills, fatigue, CP, SOB, or any other c/o. \par               \par Family history is negative for breast or ovarian cancer.

## 2019-05-16 NOTE — REVIEW OF SYSTEMS
[Patient Intake Form Reviewed] : Patient intake form was reviewed [Negative] : Allergic/Immunologic [Fatigue: Grade 0] : Fatigue: Grade 0 [Breast Pain: Grade 0] : Breast Pain: Grade 0 [Skin Hyperpigmentation: Grade 0] : Skin Hyperpigmentation: Grade 0 [Dermatitis Radiation: Grade 1 - Faint erythema or dry desquamation] : Dermatitis Radiation: Grade 1 - Faint erythema or dry desquamation [de-identified] : s/p Left mastectomy, incision well healed

## 2019-05-16 NOTE — PHYSICAL EXAM
[Range of Motion to Joints] : range of motion to joints [Normal] : oriented to person, place and time, the affect was normal, the mood was normal and not anxious [de-identified] : s/p left mastectomy, lateral incision left breast, well healed, loose skin. No erythema, dermatitis, skin breakdown noted to chest or axillae. LUE edema, improving. [de-identified] : LUE edema, improving.  [de-identified] : s/p left mastectomy, lateral incision left breast, well healed, loose skin. No erythema, dermatitis, skin breakdown noted to chest or axillae.

## 2019-08-13 ENCOUNTER — CHART COPY (OUTPATIENT)
Age: 84
End: 2019-08-13

## 2019-08-21 ENCOUNTER — OUTPATIENT (OUTPATIENT)
Dept: OUTPATIENT SERVICES | Facility: HOSPITAL | Age: 84
LOS: 1 days | End: 2019-08-21
Payer: MEDICARE

## 2019-08-21 ENCOUNTER — APPOINTMENT (OUTPATIENT)
Dept: CT IMAGING | Facility: HOSPITAL | Age: 84
End: 2019-08-21
Payer: MEDICARE

## 2019-08-21 DIAGNOSIS — Z90.89 ACQUIRED ABSENCE OF OTHER ORGANS: Chronic | ICD-10-CM

## 2019-08-21 PROCEDURE — 71260 CT THORAX DX C+: CPT | Mod: 26

## 2019-08-21 PROCEDURE — 71260 CT THORAX DX C+: CPT

## 2019-10-25 ENCOUNTER — OUTPATIENT (OUTPATIENT)
Dept: OUTPATIENT SERVICES | Facility: HOSPITAL | Age: 84
LOS: 1 days | End: 2019-10-25
Payer: MEDICARE

## 2019-10-25 ENCOUNTER — APPOINTMENT (OUTPATIENT)
Dept: RADIOLOGY | Facility: HOSPITAL | Age: 84
End: 2019-10-25
Payer: MEDICARE

## 2019-10-25 DIAGNOSIS — Z90.89 ACQUIRED ABSENCE OF OTHER ORGANS: Chronic | ICD-10-CM

## 2019-10-25 PROCEDURE — 77080 DXA BONE DENSITY AXIAL: CPT | Mod: 26

## 2019-10-25 PROCEDURE — 77080 DXA BONE DENSITY AXIAL: CPT

## 2019-12-10 ENCOUNTER — OUTPATIENT (OUTPATIENT)
Dept: OUTPATIENT SERVICES | Facility: HOSPITAL | Age: 84
LOS: 1 days | End: 2019-12-10
Payer: MEDICARE

## 2019-12-10 ENCOUNTER — APPOINTMENT (OUTPATIENT)
Dept: MAMMOGRAPHY | Facility: HOSPITAL | Age: 84
End: 2019-12-10
Payer: MEDICARE

## 2019-12-10 ENCOUNTER — APPOINTMENT (OUTPATIENT)
Dept: ULTRASOUND IMAGING | Facility: HOSPITAL | Age: 84
End: 2019-12-10
Payer: MEDICARE

## 2019-12-10 DIAGNOSIS — Z90.89 ACQUIRED ABSENCE OF OTHER ORGANS: Chronic | ICD-10-CM

## 2019-12-10 PROCEDURE — 77065 DX MAMMO INCL CAD UNI: CPT | Mod: 26,RT

## 2019-12-10 PROCEDURE — 77065 DX MAMMO INCL CAD UNI: CPT

## 2019-12-10 PROCEDURE — 76641 ULTRASOUND BREAST COMPLETE: CPT | Mod: 26,RT

## 2019-12-10 PROCEDURE — G0279: CPT | Mod: 26

## 2019-12-10 PROCEDURE — 76641 ULTRASOUND BREAST COMPLETE: CPT

## 2019-12-10 PROCEDURE — G0279: CPT

## 2020-02-03 ENCOUNTER — OUTPATIENT (OUTPATIENT)
Dept: OUTPATIENT SERVICES | Facility: HOSPITAL | Age: 85
LOS: 1 days | End: 2020-02-03
Payer: MEDICARE

## 2020-02-03 ENCOUNTER — APPOINTMENT (OUTPATIENT)
Dept: CT IMAGING | Facility: HOSPITAL | Age: 85
End: 2020-02-03
Payer: MEDICARE

## 2020-02-03 DIAGNOSIS — Z90.89 ACQUIRED ABSENCE OF OTHER ORGANS: Chronic | ICD-10-CM

## 2020-02-03 PROCEDURE — 71260 CT THORAX DX C+: CPT

## 2020-02-03 PROCEDURE — 71260 CT THORAX DX C+: CPT | Mod: 26

## 2020-03-31 ENCOUNTER — APPOINTMENT (OUTPATIENT)
Dept: INTERNAL MEDICINE | Facility: CLINIC | Age: 85
End: 2020-03-31

## 2021-10-18 ENCOUNTER — INPATIENT (INPATIENT)
Facility: HOSPITAL | Age: 86
LOS: 2 days | Discharge: HOME | DRG: 175 | End: 2021-10-21
Payer: MEDICARE

## 2021-10-18 VITALS
HEART RATE: 82 BPM | SYSTOLIC BLOOD PRESSURE: 132 MMHG | DIASTOLIC BLOOD PRESSURE: 83 MMHG | OXYGEN SATURATION: 97 % | RESPIRATION RATE: 16 BRPM | HEIGHT: 66 IN | TEMPERATURE: 98 F | WEIGHT: 139.99 LBS

## 2021-10-18 DIAGNOSIS — R63.8 OTHER SYMPTOMS AND SIGNS CONCERNING FOOD AND FLUID INTAKE: ICD-10-CM

## 2021-10-18 DIAGNOSIS — Z90.89 ACQUIRED ABSENCE OF OTHER ORGANS: Chronic | ICD-10-CM

## 2021-10-18 DIAGNOSIS — E87.2 ACIDOSIS: ICD-10-CM

## 2021-10-18 DIAGNOSIS — R53.83 OTHER FATIGUE: ICD-10-CM

## 2021-10-18 DIAGNOSIS — I26.99 OTHER PULMONARY EMBOLISM WITHOUT ACUTE COR PULMONALE: ICD-10-CM

## 2021-10-18 DIAGNOSIS — Z90.12 ACQUIRED ABSENCE OF LEFT BREAST AND NIPPLE: Chronic | ICD-10-CM

## 2021-10-18 DIAGNOSIS — N39.0 URINARY TRACT INFECTION, SITE NOT SPECIFIED: ICD-10-CM

## 2021-10-18 DIAGNOSIS — R09.89 OTHER SPECIFIED SYMPTOMS AND SIGNS INVOLVING THE CIRCULATORY AND RESPIRATORY SYSTEMS: ICD-10-CM

## 2021-10-18 DIAGNOSIS — D64.9 ANEMIA, UNSPECIFIED: ICD-10-CM

## 2021-10-18 LAB
ALBUMIN SERPL ELPH-MCNC: 3.2 G/DL — LOW (ref 3.3–5)
ALP SERPL-CCNC: 143 U/L — HIGH (ref 40–120)
ALT FLD-CCNC: 10 U/L — SIGNIFICANT CHANGE UP (ref 10–45)
ANION GAP SERPL CALC-SCNC: 22 MMOL/L — HIGH (ref 5–17)
ANISOCYTOSIS BLD QL: SLIGHT — SIGNIFICANT CHANGE UP
APPEARANCE UR: ABNORMAL
APTT BLD: 27.4 SEC — LOW (ref 27.5–35.5)
AST SERPL-CCNC: 21 U/L — SIGNIFICANT CHANGE UP (ref 10–40)
BACTERIA # UR AUTO: ABNORMAL /HPF
BASE EXCESS BLDV CALC-SCNC: -2.9 MMOL/L — LOW (ref -2–3)
BASOPHILS # BLD AUTO: 0.07 K/UL — SIGNIFICANT CHANGE UP (ref 0–0.2)
BASOPHILS NFR BLD AUTO: 0.9 % — SIGNIFICANT CHANGE UP (ref 0–2)
BILIRUB SERPL-MCNC: 0.7 MG/DL — SIGNIFICANT CHANGE UP (ref 0.2–1.2)
BILIRUB UR-MCNC: NEGATIVE — SIGNIFICANT CHANGE UP
BUN SERPL-MCNC: 22 MG/DL — SIGNIFICANT CHANGE UP (ref 7–23)
BURR CELLS BLD QL SMEAR: PRESENT — SIGNIFICANT CHANGE UP
CA-I SERPL-SCNC: 1.2 MMOL/L — SIGNIFICANT CHANGE UP (ref 1.15–1.33)
CALCIUM SERPL-MCNC: 9.2 MG/DL — SIGNIFICANT CHANGE UP (ref 8.4–10.5)
CHLORIDE SERPL-SCNC: 97 MMOL/L — SIGNIFICANT CHANGE UP (ref 96–108)
CK SERPL-CCNC: 28 U/L — SIGNIFICANT CHANGE UP (ref 25–170)
CO2 BLDV-SCNC: 23.2 MMOL/L — SIGNIFICANT CHANGE UP (ref 22–26)
CO2 SERPL-SCNC: 21 MMOL/L — LOW (ref 22–31)
COLOR SPEC: YELLOW — SIGNIFICANT CHANGE UP
COMMENT - URINE: SIGNIFICANT CHANGE UP
CREAT SERPL-MCNC: 0.68 MG/DL — SIGNIFICANT CHANGE UP (ref 0.5–1.3)
DIFF PNL FLD: ABNORMAL
EOSINOPHIL # BLD AUTO: 0 K/UL — SIGNIFICANT CHANGE UP (ref 0–0.5)
EOSINOPHIL NFR BLD AUTO: 0 % — SIGNIFICANT CHANGE UP (ref 0–6)
EPI CELLS # UR: ABNORMAL /HPF (ref 0–5)
GAS PNL BLDV: 132 MMOL/L — LOW (ref 136–145)
GAS PNL BLDV: SIGNIFICANT CHANGE UP
GIANT PLATELETS BLD QL SMEAR: PRESENT — SIGNIFICANT CHANGE UP
GLUCOSE SERPL-MCNC: 81 MG/DL — SIGNIFICANT CHANGE UP (ref 70–99)
GLUCOSE UR QL: NEGATIVE — SIGNIFICANT CHANGE UP
HCO3 BLDV-SCNC: 22 MMOL/L — SIGNIFICANT CHANGE UP (ref 22–29)
HCT VFR BLD CALC: 35.6 % — SIGNIFICANT CHANGE UP (ref 34.5–45)
HGB BLD-MCNC: 11.4 G/DL — LOW (ref 11.5–15.5)
HYPOCHROMIA BLD QL: SLIGHT — SIGNIFICANT CHANGE UP
INR BLD: 1.13 — SIGNIFICANT CHANGE UP (ref 0.88–1.16)
KETONES UR-MCNC: >=80 MG/DL
LACTATE SERPL-SCNC: 1 MMOL/L — SIGNIFICANT CHANGE UP (ref 0.5–2)
LEUKOCYTE ESTERASE UR-ACNC: ABNORMAL
LYMPHOCYTES # BLD AUTO: 0.48 K/UL — LOW (ref 1–3.3)
LYMPHOCYTES # BLD AUTO: 6.1 % — LOW (ref 13–44)
MAGNESIUM SERPL-MCNC: 2.1 MG/DL — SIGNIFICANT CHANGE UP (ref 1.6–2.6)
MANUAL SMEAR VERIFICATION: SIGNIFICANT CHANGE UP
MCHC RBC-ENTMCNC: 27.1 PG — SIGNIFICANT CHANGE UP (ref 27–34)
MCHC RBC-ENTMCNC: 32 GM/DL — SIGNIFICANT CHANGE UP (ref 32–36)
MCV RBC AUTO: 84.8 FL — SIGNIFICANT CHANGE UP (ref 80–100)
MONOCYTES # BLD AUTO: 0.48 K/UL — SIGNIFICANT CHANGE UP (ref 0–0.9)
MONOCYTES NFR BLD AUTO: 6.1 % — SIGNIFICANT CHANGE UP (ref 2–14)
NEUTROPHILS # BLD AUTO: 6.87 K/UL — SIGNIFICANT CHANGE UP (ref 1.8–7.4)
NEUTROPHILS NFR BLD AUTO: 86.9 % — HIGH (ref 43–77)
NITRITE UR-MCNC: POSITIVE
NT-PROBNP SERPL-SCNC: 1110 PG/ML — HIGH (ref 0–300)
OVALOCYTES BLD QL SMEAR: SLIGHT — SIGNIFICANT CHANGE UP
PCO2 BLDV: 38 MMHG — LOW (ref 39–42)
PH BLDV: 7.37 — SIGNIFICANT CHANGE UP (ref 7.32–7.43)
PH UR: 6 — SIGNIFICANT CHANGE UP (ref 5–8)
PHOSPHATE SERPL-MCNC: 2.7 MG/DL — SIGNIFICANT CHANGE UP (ref 2.5–4.5)
PLAT MORPH BLD: ABNORMAL
PLATELET # BLD AUTO: 262 K/UL — SIGNIFICANT CHANGE UP (ref 150–400)
PO2 BLDV: 40 MMHG — SIGNIFICANT CHANGE UP (ref 25–45)
POIKILOCYTOSIS BLD QL AUTO: SIGNIFICANT CHANGE UP
POTASSIUM BLDV-SCNC: 3.8 MMOL/L — SIGNIFICANT CHANGE UP (ref 3.5–5.1)
POTASSIUM SERPL-MCNC: 3.8 MMOL/L — SIGNIFICANT CHANGE UP (ref 3.5–5.3)
POTASSIUM SERPL-SCNC: 3.8 MMOL/L — SIGNIFICANT CHANGE UP (ref 3.5–5.3)
PROT SERPL-MCNC: 6.5 G/DL — SIGNIFICANT CHANGE UP (ref 6–8.3)
PROT UR-MCNC: 100 MG/DL
PROTHROM AB SERPL-ACNC: 13.5 SEC — SIGNIFICANT CHANGE UP (ref 10.6–13.6)
RBC # BLD: 4.2 M/UL — SIGNIFICANT CHANGE UP (ref 3.8–5.2)
RBC # FLD: 15 % — HIGH (ref 10.3–14.5)
RBC BLD AUTO: ABNORMAL
RBC CASTS # UR COMP ASSIST: < 5 /HPF — SIGNIFICANT CHANGE UP
SAO2 % BLDV: 67.9 % — SIGNIFICANT CHANGE UP (ref 67–88)
SARS-COV-2 RNA SPEC QL NAA+PROBE: NEGATIVE — SIGNIFICANT CHANGE UP
SCHISTOCYTES BLD QL AUTO: SLIGHT — SIGNIFICANT CHANGE UP
SODIUM SERPL-SCNC: 140 MMOL/L — SIGNIFICANT CHANGE UP (ref 135–145)
SP GR SPEC: >=1.03 — SIGNIFICANT CHANGE UP (ref 1–1.03)
SPHEROCYTES BLD QL SMEAR: SLIGHT — SIGNIFICANT CHANGE UP
TROPONIN T SERPL-MCNC: 0.01 NG/ML — SIGNIFICANT CHANGE UP (ref 0–0.01)
UROBILINOGEN FLD QL: 0.2 E.U./DL — SIGNIFICANT CHANGE UP
WBC # BLD: 7.9 K/UL — SIGNIFICANT CHANGE UP (ref 3.8–10.5)
WBC # FLD AUTO: 7.9 K/UL — SIGNIFICANT CHANGE UP (ref 3.8–10.5)
WBC UR QL: ABNORMAL /HPF

## 2021-10-18 PROCEDURE — G1004: CPT

## 2021-10-18 PROCEDURE — 99285 EMERGENCY DEPT VISIT HI MDM: CPT

## 2021-10-18 PROCEDURE — 70450 CT HEAD/BRAIN W/O DYE: CPT | Mod: 26,MG

## 2021-10-18 PROCEDURE — 71260 CT THORAX DX C+: CPT | Mod: 26,MG

## 2021-10-18 PROCEDURE — 93970 EXTREMITY STUDY: CPT | Mod: 26

## 2021-10-18 PROCEDURE — 99223 1ST HOSP IP/OBS HIGH 75: CPT | Mod: GC

## 2021-10-18 PROCEDURE — 74177 CT ABD & PELVIS W/CONTRAST: CPT | Mod: 26,MG

## 2021-10-18 PROCEDURE — 71045 X-RAY EXAM CHEST 1 VIEW: CPT | Mod: 26

## 2021-10-18 RX ORDER — HEPARIN SODIUM 5000 [USP'U]/ML
5000 INJECTION INTRAVENOUS; SUBCUTANEOUS ONCE
Refills: 0 | Status: COMPLETED | OUTPATIENT
Start: 2021-10-18 | End: 2021-10-18

## 2021-10-18 RX ORDER — HEPARIN SODIUM 5000 [USP'U]/ML
5000 INJECTION INTRAVENOUS; SUBCUTANEOUS EVERY 6 HOURS
Refills: 0 | Status: DISCONTINUED | OUTPATIENT
Start: 2021-10-18 | End: 2021-10-18

## 2021-10-18 RX ORDER — SODIUM CHLORIDE 9 MG/ML
500 INJECTION INTRAMUSCULAR; INTRAVENOUS; SUBCUTANEOUS ONCE
Refills: 0 | Status: COMPLETED | OUTPATIENT
Start: 2021-10-18 | End: 2021-10-18

## 2021-10-18 RX ORDER — ACETAMINOPHEN 500 MG
650 TABLET ORAL EVERY 6 HOURS
Refills: 0 | Status: DISCONTINUED | OUTPATIENT
Start: 2021-10-18 | End: 2021-10-21

## 2021-10-18 RX ORDER — HEPARIN SODIUM 5000 [USP'U]/ML
INJECTION INTRAVENOUS; SUBCUTANEOUS
Qty: 25000 | Refills: 0 | Status: DISCONTINUED | OUTPATIENT
Start: 2021-10-18 | End: 2021-10-19

## 2021-10-18 RX ORDER — SODIUM CHLORIDE 9 MG/ML
960 INJECTION, SOLUTION INTRAVENOUS
Refills: 0 | Status: DISCONTINUED | OUTPATIENT
Start: 2021-10-18 | End: 2021-10-19

## 2021-10-18 RX ORDER — HEPARIN SODIUM 5000 [USP'U]/ML
2500 INJECTION INTRAVENOUS; SUBCUTANEOUS EVERY 6 HOURS
Refills: 0 | Status: DISCONTINUED | OUTPATIENT
Start: 2021-10-18 | End: 2021-10-18

## 2021-10-18 RX ORDER — CEFTRIAXONE 500 MG/1
1000 INJECTION, POWDER, FOR SOLUTION INTRAMUSCULAR; INTRAVENOUS ONCE
Refills: 0 | Status: COMPLETED | OUTPATIENT
Start: 2021-10-18 | End: 2021-10-18

## 2021-10-18 RX ADMIN — SODIUM CHLORIDE 80 MILLILITER(S): 9 INJECTION, SOLUTION INTRAVENOUS at 22:17

## 2021-10-18 RX ADMIN — HEPARIN SODIUM 5000 UNIT(S): 5000 INJECTION INTRAVENOUS; SUBCUTANEOUS at 17:56

## 2021-10-18 RX ADMIN — HEPARIN SODIUM 1100 UNIT(S)/HR: 5000 INJECTION INTRAVENOUS; SUBCUTANEOUS at 17:57

## 2021-10-18 RX ADMIN — CEFTRIAXONE 100 MILLIGRAM(S): 500 INJECTION, POWDER, FOR SOLUTION INTRAMUSCULAR; INTRAVENOUS at 16:29

## 2021-10-18 RX ADMIN — SODIUM CHLORIDE 500 MILLILITER(S): 9 INJECTION INTRAMUSCULAR; INTRAVENOUS; SUBCUTANEOUS at 15:14

## 2021-10-18 NOTE — H&P ADULT - PROBLEM SELECTOR PLAN 2
Pt complains of shakiness and fatigue w exertion of 2-3 weeks associated with poor PO intake. DDx includes: Deconditioning due to poor PO intake, Hypothyroidism, less likely CHF, malignancy, parkinsons  Poor PO intake leading to dehydration and fatigue would explain her concentrated, urine and thirst. furthermore could lead to a ketoacidosis, and this pt has an elevated AG w normal sugar and no polyuria. Less likely CHF as pt euvolemic on exam although does have elevated BNP in setting of PE. No lesions on CT A/P. Pt describes slower gate but has no tremor, masked facies, or cogwheel rigidity.  - s/p 500cc NS  - start 80cc/hr of LR for 12 hours  - f/u TSH  - f/u echo  - Start treatment for PE, rehydrate, and reassess

## 2021-10-18 NOTE — H&P ADULT - HISTORY OF PRESENT ILLNESS
HPI: 85 y/o F w PMH of breast cancer s/p mastectomy in 2018, radiation in 2019 who complains of weakness of 2-3 weeks. She further characterizes this as shakiness and fatigue whenever she gets up to walk. It got to the point where she would tire with 2-3 steps. She feels her gait is slower than usual. She has some dyspnea on exertion but no dizziness, syncope, falls, CP or palpitations. No orthopnea or leg swelling. She also describes a reduced appetite, reduced PO intake, and weight loss of 20 pounds over the past several weeks. For about one week she has also complained of thirst, requiring her to get up at night to drink water w reduced urination, no dysuria, fevers, or chills. She is vaccinated and has not gotten covid.    In the ED:  Initial vital signs: T: 98 F, HR: 82, BP: 132/83, R: 16, SpO2: 97% on RA  ED course:   Labs: significant for Bicarb 21, AG of 22, , BNP 1110,   Trop 0.01, lactate 1.0  UA: Cloudy, Many WBC, Nitrite+, small LE  Imaging:  CT head: negative for bleed or CVA  CT chest, abd, pelvis: PE in R middle and lower lobe, bladder wall thickening suggesting UTI  CXR: clear lungs  EKG: NSR w PVCs  Medications: Ceftriaxone 1g, heparin 500U IV push, heparin gtt, 500cc NS  Consults: none

## 2021-10-18 NOTE — ED ADULT NURSE REASSESSMENT NOTE - NS ED NURSE REASSESS COMMENT FT1
Case management at bedside. Resting comfortably in stretcher. A&Ox4, speaking in clear full sentences, NAD.

## 2021-10-18 NOTE — ED ADULT NURSE NOTE - OBJECTIVE STATEMENT
pt present to ED for unable to eat x 1 week. pt denies any difficulty swallowing, states "I doesn't taste good." pt denies any n/v/d. +lethargy noted. denies any CP, SOB or dizziness. pt feels tired after taking 3/4 steps.

## 2021-10-18 NOTE — ED PROVIDER NOTE - CLINICAL SUMMARY MEDICAL DECISION MAKING FREE TEXT BOX
86F PMH breast ca s/p L mastectomy (2018) and radiation (2019) p/w weakness. Pt has not followed up w/ any doctors for at least 1-2 years. Now w/ at least 2-3 w (unclear duration) of decreased appetite, weight loss, worsening generalized fatigue to the point that she cant take more than 2-3 steps. Lives w/ friend, has no family. No other systemic symptoms. Vitals wnl, exam as above.  ddx: Metabolic vs. cancer vs. infectious vs. less likely cardiac.   labs, UA, CT, reassess.

## 2021-10-18 NOTE — H&P ADULT - PROBLEM SELECTOR PLAN 5
Unclear etiology. Pt has normocytic anemia w hgb of 11.4. Previously had hgb of 10.2. No signs of bleeding, hemodynamically stable, unlikely cause of fatigue  - active T+S  -transfuse below 7 Fluids: 500cc NS, will start LR 80cc/hr for 12 hours  Electrolytes: Mg>2, K>4  Nutrition: replete lytes PRN  Prophylaxis: heparin gtt  Activity: AAT, OOBTC  GI: none  C: FC  Dispo: Admit to [facility]

## 2021-10-18 NOTE — H&P ADULT - NSHPSOCIALHISTORY_GEN_ALL_CORE
Work: Works as a writer  Tobacco use: unknown  EtOH use: unknown  Illicit drug use: unknown    Living situation: Lives alone in studio apt in same as building as friend. Describes no trouble w ADLs. Ambulates without cane or walker.

## 2021-10-18 NOTE — H&P ADULT - PROBLEM SELECTOR PLAN 6
Fluids: 500cc NS, will start LR 80cc/hr for 12 hours  Electrolytes: Mg>2, K>4  Nutrition: replete lytes PRN  Prophylaxis: heparin gtt  Activity: AAT, OOBTC  GI: none  C: FC  Dispo: Admit to [facility] Pt had positive UA, but it was concentrated with high specific gravity in setting of reduced PO intake, and pt is asymptomatic. CT did show findings suspicious for cystitis  - received 1 g ceftriaxone in ED  - rehydrate pt and repeat UA

## 2021-10-18 NOTE — H&P ADULT - ATTENDING COMMENTS
reviewed pertinent data , h&p  patient seen and examined overnight   a/f b/l PE   PE as above, w/ lower ext +1 nonpitting edema b/l     1. b/l PE : started on heparin gtt o/n, monitor PTT, transition to NOAC, followup ECHO and dopplers of lower ext   2. IVFs o/n, monitor anion gap   3. followup uctxs      rest of  plan as above

## 2021-10-18 NOTE — ED ADULT TRIAGE NOTE - CHIEF COMPLAINT QUOTE
Pt BIBEMS for evaluation of generalized weakness x 2 weeks, worsening over the past couple days. Pt denies fever, chills, CP, SOB, NVD, abdominal pain, urinary sx.

## 2021-10-18 NOTE — H&P ADULT - ASSESSMENT
85 y/o F w PMH of breast cancer s/p mastectomy in 2018, radiation in 2019 who complains of progressive weakness and fatigue on exertion of 2-3 weeks with reduced PO intake and weight loss and found to have PE and positive UA. Admitted for further workup and management.

## 2021-10-18 NOTE — ED ADULT NURSE NOTE - NSIMPLEMENTINTERV_GEN_ALL_ED
Implemented All Fall with Harm Risk Interventions:  [place] to call system. Call bell, personal items and telephone within reach. Instruct patient to call for assistance. Room bathroom lighting operational. Non-slip footwear when patient is off stretcher. Physically safe environment: no spills, clutter or unnecessary equipment. Stretcher in lowest position, wheels locked, appropriate side rails in place. Provide visual cue, wrist band, yellow gown, etc. Monitor gait and stability. Monitor for mental status changes and reorient to person, place, and time. Review medications for side effects contributing to fall risk. Reinforce activity limits and safety measures with patient and family. Provide visual clues: red socks.

## 2021-10-18 NOTE — ED PROVIDER NOTE - PROGRESS NOTE DETAILS
[place]: Hgb 11.4, AG 22, alk phos 143, albumin 3.2, other labs grossly wnl. COVID neg. UA grossly positive, will tx for UTI. CTH w/ no acute pathology. Other CTs showing "Acute PE right middle lobe and right lower lobe as described above. Status post left mastectomy. Chronic radiation pneumonitis left upper lobe. Suspected cystitis."  Pt has no SOB, vitals wnl. Will start heparin, abx for UTI. Will admit for further care. Clinically does not require higher level of care at this time. Updated pt.

## 2021-10-18 NOTE — H&P ADULT - PROBLEM SELECTOR PLAN 1
Submassive PE: Patient presented with complaints of progressive fatigue on exertion and mild dyspnea. She's hemodynamically stable and not hypoxic but did have elevated BNP and evidence of R heart strain on CT. Unclear if PE contributed to her fatigue, or if she has been more sedentary lately due to fatigue which contributed to PE.   - given heparin bolus and started on heparin gtt  - monitor for hemodynamic stability and signs of cardiac ischemia  - consider switching to eliquis Submassive PE: Patient presented with complaints of progressive fatigue on exertion and mild dyspnea. She's hemodynamically stable and not hypoxic but did have elevated BNP and evidence of R heart strain on CT. Unclear if PE contributed to her fatigue, or if she has been more sedentary lately due to fatigue which contributed to PE.   - given heparin bolus and started on heparin gtt  - monitor for hemodynamic stability and signs of cardiac ischemia  - consider switching to eliquis in am  - f/u LE US for DVT  - f/u midnight BNP Submassive PE: Patient presented with complaints of progressive fatigue on exertion and mild dyspnea. She's hemodynamically stable and not hypoxic but did have elevated BNP and evidence of R heart strain on CT. Unclear if PE contributed to her fatigue, or if she has been more sedentary lately due to fatigue which contributed to PE.   - given heparin bolus and started on heparin gtt  - monitor for hemodynamic stability and signs of cardiac ischemia  - consider switching to eliquis in am  - f/u LE US for DVT  - f/u midnight BNP    ADD: followup BMP for anion gap

## 2021-10-18 NOTE — ED PROVIDER NOTE - PHYSICAL EXAMINATION
no LE edema, normal equal distal pulses  No spinal ttp, neck FROM. Strength 5/5. No bony ttp, FROM all extremities.

## 2021-10-18 NOTE — H&P ADULT - NSHPLABSRESULTS_GEN_ALL_CORE
.  LABS:                         11.4   7.90  )-----------( 262      ( 18 Oct 2021 12:47 )             35.6     10-18    140  |  97  |  22  ----------------------------<  81  3.8   |  21<L>  |  0.68    Ca    9.2      18 Oct 2021 12:47  Phos  2.7     10-18  Mg     2.1     10-18    TPro  6.5  /  Alb  3.2<L>  /  TBili  0.7  /  DBili  x   /  AST  21  /  ALT  10  /  AlkPhos  143<H>  10-18    PT/INR - ( 18 Oct 2021 16:31 )   PT: 13.5 sec;   INR: 1.13          PTT - ( 18 Oct 2021 16:31 )  PTT:27.4 sec  Urinalysis Basic - ( 18 Oct 2021 15:32 )    Color: Yellow / Appearance: Cloudy / SG: >=1.030 / pH: x  Gluc: x / Ketone: >=80 mg/dL  / Bili: Negative / Urobili: 0.2 E.U./dL   Blood: x / Protein: 100 mg/dL / Nitrite: POSITIVE   Leuk Esterase: Small / RBC: < 5 /HPF / WBC Many /HPF   Sq Epi: x / Non Sq Epi: 5-10 /HPF / Bacteria: Many /HPF      Serum Pro-Brain Natriuretic Peptide: 1110 pg/mL (10-18 @ 12:47)    Lactate, Blood: 1.0 mmol/L (10-18 @ 18:06)      RADIOLOGY, EKG & ADDITIONAL TESTS: Reviewed.

## 2021-10-18 NOTE — ED PROVIDER NOTE - OBJECTIVE STATEMENT
86F [facility] breast ca s/p L mastectomy (2018) and radiation (2019) p/w weakness. Pt has not followed up w/ any doctors for at least 1-2 years. Now w/ at least 2-3 w (unclear duration) of decreased appetite, weight loss, worsening generalized fatigue to the point that she cant take more than 2-3 steps. Lives w/ friend, has no family. No other systemic symptoms.   Denies falls, HA, vertiginous symptoms, lightheaded, SOB, CP, f/c, rhinorrhea, cough, congestion, sore throat, nausea, vomiting, diarrhea, abd pain, urinary complaints, black/bloody stool, focal weakness/numbness, vision changes, neck/back pain, rashes, LE pain/swelling.

## 2021-10-18 NOTE — H&P ADULT - PROBLEM SELECTOR PLAN 3
Pt had positive UA, but it was concentrated with high specific gravity in setting of reduced PO intake, and pt is asymptomatic  - received 1 g ceftriaxone in ED  - rehydrate pt and repeat UA Likely 2/2 starvation ketoacidosis in setting of reduced PO intake.  - f/u B hydroxy  - rehydrate per above plan

## 2021-10-18 NOTE — H&P ADULT - PROBLEM SELECTOR PLAN 4
Likely 2/2 starvation ketoacidosis in setting of reduced PO intake.  - f/u B hydroxy  - rehydrate per above plan Unclear etiology. Pt has normocytic anemia w hgb of 11.4. Previously had hgb of 10.2. No signs of bleeding, hemodynamically stable, unlikely cause of fatigue  - active T+S  -transfuse below 7

## 2021-10-18 NOTE — H&P ADULT - NSHPPHYSICALEXAM_GEN_ALL_CORE
T(C): 36.8 (10-18-21 @ 18:11), Max: 36.8 (10-18-21 @ 18:11)  HR: 65 (10-18-21 @ 18:11) (65 - 82)  BP: 116/66 (10-18-21 @ 18:11) (116/66 - 132/83)  RR: 18 (10-18-21 @ 13:54) (16 - 18)  SpO2: 99% (10-18-21 @ 13:54) (97% - 99%)    GEN - Elderly female lying flat, appears comfortable on RA  HEENT - NCAT, EOMI, no teeth  RESP - CTA BL, no wheeze/stridor/rhonchi/crackles. not on supplemental O2.  CARDIO - NS1S2, RRR. No murmurs/rubs/gallops.  ABD - Soft/Non tender/Non distended  Ext - No CHEVY. no signs of venous/arterial stasis ulcers, warm and well perfused  Neuro - cn 2-12 grossly intact.  no tremor. gait not observed. strength 5/5 in upper extremities, and 3-4/5 in LE  Skin - clean, dry, intact. no rashes or lesions.    Psych- AAOx3. appropriate behaviour. attentive. normal affect.

## 2021-10-18 NOTE — ED ADULT NURSE NOTE - NS PRO PASSIVE SMOKE EXP
HOSPITALIST- HISTORY AND PHYSICAL    Patient ID:  Ra Martinez  [id number]  84 year old  1936     Primary Care Physician:   No Pcp    Chief Complaint:   Generalized weakness, fever, abdominal pain    History of Present Illness:   Patient is a 84 year old  male past medical history significant for hypertension, peripheral vascular disease, diabetes mellitus, hyperlipidemia, GERD, BPH, history of bladder cancer in the past presents to ER from [facility] with generalized weakness and fever.  Vital signs ER her stable.  Labs indicated normal creatinine.  Lactic acid of 2.6.  The WBC of 12.6.  UA suggestive moderate bacteria, greater than 100 wbc's.  Rapid COVID negative.  CT abdomen and pelvis suggestive of gallbladder distension, left greater than right fat stranding of renal pelvis stable AAA.    Patient was given IV fluid bolus, repeat lactic acid 1.1.  Received Rocephin.  Is being admitted for further evaluation and management.  On my examination patient is resting comfortably complains of abdominal discomfort.  Denies any fever chills or rigors.  Complains of back soreness.    Past Medical History:     BPH (benign prostatic hyperplasia)                            HTN (hypertension)                                            GERD (gastroesophageal reflux disease)                        Glaucoma                                                      PVD (peripheral vascular disease) (CMS/HCC)                   COPD (chronic obstructive pulmonary disease) (*               AAA (abdominal aortic aneurysm) (CMS/HCC)                     Pneumonia                                                     Bladder cancer (CMS/HCC)                                      Blindness                                                     Past Surgical History:    BLADDER TUMOR EXCISION                                        Family History:   No pertinent related to chief complaint.     Social History:   Social History      Socioeconomic History   • Marital status: Single     Spouse name: Room [address]   • Number of children: Not on file   • Years of education: Not on file   • Highest education level: Not on file   Occupational History   • Occupation: sanchez   Tobacco Use   • Smoking status: Former Smoker     Packs/day: 0.00   • Smokeless tobacco: Never Used   Vaping Use   • Vaping Use: never used   Substance and Sexual Activity   • Alcohol use: Never   • Drug use: Never   • Sexual activity: Not on file   Other Topics Concern   • Not on file   Social History Narrative    Single never     No children    Ex -smoker     Non -drinker                   Social Determinants of Health     Financial Resource Strain:    • Social Determinants: Financial Resource Strain:    Food Insecurity:    • Social Determinants: Food Insecurity:    Transportation Needs:    • Lack of Transportation (Medical):    • Lack of Transportation (Non-Medical):    Physical Activity:    • Days of Exercise per Week:    • Minutes of Exercise per Session:    Stress:    • Social Determinants: Stress:    Social Connections:    • Social Determinants: Social Connections:    Intimate Partner Violence: Not At Risk   • Social Determinants: Intimate Partner Violence Past Fear: No   • Social Determinants: Intimate Partner Violence Current Fear: No     Medications:   Nexium  Magnesium  Dulcolax  Mucinex  Metformin  Lopressor  Glucotrol  Docusate  Norvasc  Lipitor  Flomax  Iron sulfate    Allergies:   Allergies as of 08/18/2021   • (No Known Allergies)     Review of Systems:   All other systems that were reviewed are negative except for per mentioned in history of present illness.     Physical Exam:   Vital Signs:    Visit Vitals  /61   Pulse 82   Temp 98.3 °F (36.8 °C) (Oral)   Resp 19   Wt 59.9 kg   SpO2 95%   BMI 21.97 kg/m²     General: This is a 84 year old male in no acute distress, not in distress secondary to pain.  Thinly built appropriate looking for age   male  Psych: He is awake alert and oriented to time, place and person. Mood and affect are appropriate.  Head: Appears to be atraumatic and normocephalic   Eyes: Reveal no icterus, no scleral injection, no conjunctival pallor.  Legally blind.  Neck: Supple and symmetric. No carotid bruit. There is no JVD or thyromegaly     Lungs: Air entry good in all 4 quadrants. Reveals good effort and lungs are clear to auscultation bilaterally. There are no wheezes or rhonchi.   Heart: Reveals presence of first and second heart sounds. Regular rate and rhythm. No murmurs, rubs or gallops.   Abdomen:  Normoactive bowel sounds. Soft and nontender. There is no rebound tenderness.  There is no hepatosplenomegaly.  Pulsatile mass well.  Left mid abdomen and renal angle tenderness.  Extremities: No clubbing, cyanosis or edema.  Neurologic: Cranial nerves II through XII appear grossly intact. Sensation is intact  Skin: Appears unremarkable and no rashes are present.    Labs:  Lab Results   Component Value Date    SODIUM 134 (L) 08/18/2021    POTASSIUM 4.4 08/18/2021    CHLORIDE 99 08/18/2021    CO2 25 08/18/2021    GLUCOSE 182 (H) 08/18/2021    BUN 27 (H) 08/18/2021    CREATININE 1.08 08/18/2021    ALBUMIN 3.0 (L) 08/18/2021    BILIRUBIN 0.6 08/18/2021    LACTA 1.1 08/18/2021    AST 21 08/18/2021     Lab Results   Component Value Date    WBC 12.6 (H) 08/18/2021    HGB 10.7 (L) 08/18/2021    HCT 32.0 (L) 08/18/2021     08/18/2021     Cardiac Investigations:   Results for orders placed or performed during the hospital encounter of 08/18/21   Electrocardiogram 12-Lead   Result Value Ref Range    Systolic Blood Pressure 133     Diastolic Blood Pressure 62     Ventricular Rate EKG/Min (BPM) 90     Atrial Rate (BPM) 90     RI-Interval (MSEC) 152     QRS-Interval (MSEC) 108     QT-Interval (MSEC) 368     QTc 450     P Axis (Degrees) 72     R Axis (Degrees) 63     T Axis (Degrees) 58     REPORT TEXT       Normal sinus  rhythm  Incomplete right bundle branch block  Borderline ECG  No previous ECGs available       Diagnostics:   XR Chest AP or PA    Result Date: 8/18/2021  Narrative: EXAM: XR CHEST AP OR PA INDICATION:  Fever COMPARISON:  None.     Impression: FINDINGS/IMPRESSION:  The heart size and central vasculature are within normal limits. There is mild interstitial prominence without evidence of dense focal consolidation or significant effusion.     CT ABDOMEN PELVIS W CONTRAST    Result Date: 8/18/2021  Narrative: EXAM: CT ABDOMEN PELVIS W CONTRAST INDICATION: Abdominal pain, fever COMPARISON: CT scan 10/7/2020 TECHNIQUE: Helical CT of the ABDOMEN and PELVIS was performed WITH CONTRAST with sagittal and coronal reconstructions. 100 mL of Omnipaque-300 was administered. FINDINGS: Lower Chest: Mild interval increase in bibasilar atelectasis. Trace right pleural effusion. Atherosclerotic vascular disease. Severe coronary arterial calcifications and/or stents. Trace pericardial fluid. Liver: Smooth contour. No new focal hepatic mass. Gallbladder/Biliary: No high attenuation foci are visualized within the gallbladder lumen. No pericholecystic fluid is identified. The gallbladder appears distended. Pancreas: No focal pancreatic mass or ductal dilatation. No abnormal peripancreatic fluid collection. Atrophic changes. Spleen: Normal. Adrenals: Bilateral adrenal mass lesions are again visualized. These appear minimally increased in size bilaterally. Kidneys: No new hydronephrosis. No abnormal perinephric fluid collection. Small renal hypodensities bilaterally. Small areas of relatively diminished perfusion, possibly related to prior infarcts. Nonobstructing renal calculi. Vascular calcifications. Mild increase in perinephric fat stranding, left greater than right. Mild fat stranding adjacent to the left renal pelvis and proximal left ureter. Bowel: No evidence for bowel obstruction or intraperitoneal free air. Colonic diverticulosis.  No definite CT evidence of acute diverticulitis. Mesentery/Retroperitoneum: No free fluid. Aorta/IVC/Vessels: Severe atherosclerotic vascular disease. Large infrarenal abdominal aortic aneurysm, similar to the prior. This contains a large amount of mural thrombus with stenotic appearance of the distal abdominal aortic lumen similar to the prior study. Again probable occlusions of the bilateral external iliac arteries and severe atherosclerotic disease throughout the pelvis and lower extremities. Extensive atherosclerotic vascular disease involving the branches of the aorta. Lymph nodes: No new adenopathy. Ventral abdominal wall: No evidence for hernia. Bones/Soft Tissues: Extensive osseous hypertrophic and degenerative changes with spondylolisthesis of L5 on S1 and bilateral spondylolysis at L5. Mild scoliosis. Pelvis: Bladder: Mild nonspecific bladder wall thickening. Prostate calcifications. No new free pelvic fluid or pelvic adenopathy.     Impression: IMPRESSION: 1. Trace right pleural effusion. Mild interval increase in bibasilar atelectasis. 2. Gallbladder distention. No pericholecystic fluid. Correlation with gallbladder ultrasound could be considered. 3. Mild increase in perinephric stranding, left greater than right with mild fat stranding adjacent to the left renal pelvis and proximal left ureter. This could be due to an inflammatory process. Mild diffuse urinary bladder wall thickening, possibly due to cystitis or incomplete distention. 4. Severe atherosclerotic vascular disease. Redemonstration of a large abdominal aortic aneurysm similar to the prior. 5. Additional findings, as detailed above.      ASSESSMENT AND PLAN:  Ra Martinez84 year old  male with hypertension, peripheral vascular disease, GERD, BPH, history of bladder cancer in the past [facility] with generalized weakness and fever.    Sepsis, lactic acidosis  Acute pyelonephritis  Acute UTI  Legally blind.  History of  bladder cancer, [place]  Gallbladder distention on CT, normal LFTs  AAA, 6 cm with mural thrombus, chronic  Peripheral vascular disease, lower extremity external iliac and femoral artery  Diabetes mellitus, not on insulin  Hypertension  COPD, not on home oxygen  Anemia, chronic  Hyperlipidemia  L5-S1 spondylolysis.    CT suggestive of pyelonephritis.  Started on IV antibiotics blood cultures done.  Lactic acidosis has improved.  Monitor white count.  Infectious disease consult.  Continue Flomax.    Abdominal pain likely secondary to pyelonephritis.  Gallbladder distended.  LFTs normal.  Lipase normal.  Trial of diet.    History of AAA and peripheral vascular disease continue with aspirin.  Vascular surgery at [facility] recommended no treatment.    Sliding scale insulin for now.  Restart home metoprolol only.  Norvasc to be started tomorrow if tolerates blood pressure.    Legally blind, uses a wheelchair.  Needs assistance with food.  Complains of difficulty swallowing.  Speech therapy consult.    Is the patient expected to require at least a two midnight stay in the hospital? YES.   This physician, recommends Ra Martinez be admitted as an INPATIENT to the medical unit/ICU. The expected LOS exceeds 2 midnights. Reason(s) for INPATIENT CARE include UTI, pyelonephritis based on severity of presenting sx, co-morbidities, and lab/imaging, this patient has an increased risk of adverse outcomes.      Sepsis re-evaluation was performed.    GI/DVT Prophylaxis:  Ppi/heparin  Code status: DNR  Time taken coordinating care, review of records, ordering test, talking to consultants and patient is 65 Minutes.     Vin Pierce MD  8/18/2021     Unknown

## 2021-10-19 LAB
ANION GAP SERPL CALC-SCNC: 17 MMOL/L — SIGNIFICANT CHANGE UP (ref 5–17)
APTT BLD: 91 SEC — HIGH (ref 27.5–35.5)
APTT BLD: 97.5 SEC — HIGH (ref 27.5–35.5)
APTT BLD: >200 SEC — CRITICAL HIGH (ref 27.5–35.5)
BUN SERPL-MCNC: 16 MG/DL — SIGNIFICANT CHANGE UP (ref 7–23)
CALCIUM SERPL-MCNC: 8.5 MG/DL — SIGNIFICANT CHANGE UP (ref 8.4–10.5)
CHLORIDE SERPL-SCNC: 100 MMOL/L — SIGNIFICANT CHANGE UP (ref 96–108)
CO2 SERPL-SCNC: 20 MMOL/L — LOW (ref 22–31)
CREAT SERPL-MCNC: 0.53 MG/DL — SIGNIFICANT CHANGE UP (ref 0.5–1.3)
GLUCOSE SERPL-MCNC: 66 MG/DL — LOW (ref 70–99)
HCT VFR BLD CALC: 31.8 % — LOW (ref 34.5–45)
HGB BLD-MCNC: 10.5 G/DL — LOW (ref 11.5–15.5)
MCHC RBC-ENTMCNC: 27.3 PG — SIGNIFICANT CHANGE UP (ref 27–34)
MCHC RBC-ENTMCNC: 33 GM/DL — SIGNIFICANT CHANGE UP (ref 32–36)
MCV RBC AUTO: 82.6 FL — SIGNIFICANT CHANGE UP (ref 80–100)
NRBC # BLD: 0 /100 WBCS — SIGNIFICANT CHANGE UP (ref 0–0)
NT-PROBNP SERPL-SCNC: 1602 PG/ML — HIGH (ref 0–300)
PLATELET # BLD AUTO: 226 K/UL — SIGNIFICANT CHANGE UP (ref 150–400)
POTASSIUM SERPL-MCNC: 3 MMOL/L — LOW (ref 3.5–5.3)
POTASSIUM SERPL-SCNC: 3 MMOL/L — LOW (ref 3.5–5.3)
RBC # BLD: 3.85 M/UL — SIGNIFICANT CHANGE UP (ref 3.8–5.2)
RBC # FLD: 15.1 % — HIGH (ref 10.3–14.5)
SODIUM SERPL-SCNC: 137 MMOL/L — SIGNIFICANT CHANGE UP (ref 135–145)
WBC # BLD: 6.75 K/UL — SIGNIFICANT CHANGE UP (ref 3.8–10.5)
WBC # FLD AUTO: 6.75 K/UL — SIGNIFICANT CHANGE UP (ref 3.8–10.5)

## 2021-10-19 PROCEDURE — 99233 SBSQ HOSP IP/OBS HIGH 50: CPT | Mod: GC

## 2021-10-19 RX ORDER — HEPARIN SODIUM 5000 [USP'U]/ML
5000 INJECTION INTRAVENOUS; SUBCUTANEOUS EVERY 6 HOURS
Refills: 0 | Status: DISCONTINUED | OUTPATIENT
Start: 2021-10-19 | End: 2021-10-19

## 2021-10-19 RX ORDER — HEPARIN SODIUM 5000 [USP'U]/ML
2500 INJECTION INTRAVENOUS; SUBCUTANEOUS EVERY 6 HOURS
Refills: 0 | Status: DISCONTINUED | OUTPATIENT
Start: 2021-10-19 | End: 2021-10-19

## 2021-10-19 RX ORDER — POTASSIUM CHLORIDE 20 MEQ
40 PACKET (EA) ORAL ONCE
Refills: 0 | Status: COMPLETED | OUTPATIENT
Start: 2021-10-19 | End: 2021-10-19

## 2021-10-19 RX ORDER — APIXABAN 2.5 MG/1
10 TABLET, FILM COATED ORAL EVERY 12 HOURS
Refills: 0 | Status: DISCONTINUED | OUTPATIENT
Start: 2021-10-19 | End: 2021-10-21

## 2021-10-19 RX ORDER — HEPARIN SODIUM 5000 [USP'U]/ML
900 INJECTION INTRAVENOUS; SUBCUTANEOUS
Qty: 25000 | Refills: 0 | Status: DISCONTINUED | OUTPATIENT
Start: 2021-10-19 | End: 2021-10-19

## 2021-10-19 RX ORDER — APIXABAN 2.5 MG/1
10 TABLET, FILM COATED ORAL EVERY 12 HOURS
Refills: 0 | Status: DISCONTINUED | OUTPATIENT
Start: 2021-10-19 | End: 2021-10-19

## 2021-10-19 RX ORDER — CEFTRIAXONE 500 MG/1
1000 INJECTION, POWDER, FOR SOLUTION INTRAMUSCULAR; INTRAVENOUS EVERY 24 HOURS
Refills: 0 | Status: COMPLETED | OUTPATIENT
Start: 2021-10-19 | End: 2021-10-20

## 2021-10-19 RX ADMIN — CEFTRIAXONE 100 MILLIGRAM(S): 500 INJECTION, POWDER, FOR SOLUTION INTRAMUSCULAR; INTRAVENOUS at 18:17

## 2021-10-19 RX ADMIN — HEPARIN SODIUM 900 UNIT(S)/HR: 5000 INJECTION INTRAVENOUS; SUBCUTANEOUS at 02:44

## 2021-10-19 RX ADMIN — Medication 40 MILLIEQUIVALENT(S): at 06:55

## 2021-10-19 RX ADMIN — APIXABAN 10 MILLIGRAM(S): 2.5 TABLET, FILM COATED ORAL at 18:17

## 2021-10-19 NOTE — DIETITIAN INITIAL EVALUATION ADULT. - PROBLEM SELECTOR PLAN 4
Unclear etiology. Pt has normocytic anemia w hgb of 11.4. Previously had hgb of 10.2. No signs of bleeding, hemodynamically stable, unlikely cause of fatigue  - active T+S  -transfuse below 7

## 2021-10-19 NOTE — DIETITIAN NUTRITION RISK NOTIFICATION - TREATMENT: THE FOLLOWING DIET HAS BEEN RECOMMENDED
Diet, Regular:   Supplement Feeding Modality:  Oral  Ensure Enlive Cans or Servings Per Day:  1       Frequency:  Three Times a day  Ensure Pudding Cans or Servings Per Day:  1       Frequency:  Two Times a day (10-19-21 @ 15:04) [Pending Verification By Attending]  Diet, Regular (10-18-21 @ 17:50) [Active]    1. Monitor PO intake    2. Honor food preferences   3. Appreciate continued assistance and encouragement with meals   3. Add on ONS; + Ensure Enlive TID (1050kcal, 60g pro) + Ensure pudding BID (340kcal, 8g pro)- entered in verification system

## 2021-10-19 NOTE — CONSULT NOTE ADULT - ASSESSMENT
per Internal Medicine    87 y/o F w PMH of breast cancer s/p mastectomy in 2018, radiation in 2019 who complains of progressive weakness and fatigue on exertion of 2-3 weeks with reduced PO intake and weight loss and found to have PE and positive UA. Admitted for further workup and management.    Problem/Plan - 1:  ·  Problem: Pulmonary embolism.   ·  Plan: Submassive PE: Patient presented with complaints of progressive fatigue on exertion and mild dyspnea. She's hemodynamically stable and not hypoxic but did have elevated BNP and evidence of R heart strain on CT. Unclear if PE contributed to her fatigue, or if she has been more sedentary lately due to fatigue which contributed to PE.   - given heparin bolus and started on heparin gtt  - monitor for hemodynamic stability and signs of cardiac ischemia  - consider switching to eliquis in am  - f/u LE US for DVT  - f/u midnight BNP    ADD: followup BMP for anion gap.    Problem/Plan - 2:  ·  Problem: Fatigue.   ·  Plan: Pt complains of shakiness and fatigue w exertion of 2-3 weeks associated with poor PO intake. DDx includes: Deconditioning due to poor PO intake, Hypothyroidism, less likely CHF, malignancy, parkinsons  Poor PO intake leading to dehydration and fatigue would explain her concentrated, urine and thirst. furthermore could lead to a ketoacidosis, and this pt has an elevated AG w normal sugar and no polyuria. Less likely CHF as pt euvolemic on exam although does have elevated BNP in setting of PE. No lesions on CT A/P. Pt describes slower gate but has no tremor, masked facies, or cogwheel rigidity.  - s/p 500cc NS  - start 80cc/hr of LR for 12 hours  - f/u TSH  - f/u echo  - Start treatment for PE, rehydrate, and reassess.    Problem/Plan - 3:  ·  Problem: High anion gap metabolic acidosis.   ·  Plan: Likely 2/2 starvation ketoacidosis in setting of reduced PO intake.  - f/u B hydroxy  - rehydrate per above plan.    Problem/Plan - 4:  ·  Problem: Anemia.   ·  Plan: Unclear etiology. Pt has normocytic anemia w hgb of 11.4. Previously had hgb of 10.2. No signs of bleeding, hemodynamically stable, unlikely cause of fatigue  - active T+S  -transfuse below 7.    Problem/Plan - 5:  ·  Problem: Nutrition, metabolism, and development symptoms.   ·  Plan: Fluids: 500cc NS, will start LR 80cc/hr for 12 hours  Electrolytes: Mg>2, K>4  Nutrition: replete lytes PRN  Prophylaxis: heparin gtt  Activity: AAT, OOBTC  GI: none  C: FC  Dispo: Admit to F.    Problem/Plan - 6:  ·  Problem: R/O Acute UTI.   ·  Plan: Pt had positive UA, but it was concentrated with high specific gravity in setting of reduced PO intake, and pt is asymptomatic. CT did show findings suspicious for cystitis  - received 1 g ceftriaxone in ED  - rehydrate pt and repeat UA.

## 2021-10-19 NOTE — PROGRESS NOTE ADULT - PROBLEM SELECTOR PLAN 6
Pt had positive UA, but it was concentrated with high specific gravity in setting of reduced PO intake, and pt is asymptomatic. CT did show findings suspicious for cystitis  - received 1 g ceftriaxone in ED  - rehydrate pt and repeat UA

## 2021-10-19 NOTE — PHYSICAL THERAPY INITIAL EVALUATION ADULT - GENERAL OBSERVATIONS, REHAB EVAL
"Marcelina Estevez's goals for this visit include:   She requests these members of her care team be copied on today's visit information:     PCP: Annette Painting    Referring Provider:  Referred Self, MD  No address on file    BP (!) 157/95  Pulse 75  Ht 1.537 m (5' 0.5\")  Wt 69.6 kg (153 lb 6.4 oz)  LMP 06/12/2014  SpO2 98%  BMI 29.47 kg/m2   "
PT IE Completed. Pt received semi-supine in bed, on RA, +IV infusing, NAD, pt agreeable to PT, CÉSAR Jade notified. Physical exam shows decr strength resulting in difficulty with bed mobility, unsteady gait and fair balance. Requires 1 person assist for functional mobility.Pt may benefit from a cane, will assess in further session. Pt left as found, CÉSAR Jade notified. Pt can benefit from PT in order to improve quality of life by increasing strength/endurance/balance with functional mobility and ADLS

## 2021-10-19 NOTE — DIETITIAN NUTRITION RISK NOTIFICATION - ADDITIONAL COMMENTS/DIETITIAN RECOMMENDATIONS
Pt endorses good appetite/intake at baseline, however, with significant decrease in PO intake over the last 2-3 weeks.  Pt endorses UBW ~159lbs.  Pt said she stopped checking the scale when her weight dropped into the 140s.  Pt is currently ~140lbs.  Additionally, pt is noted with b/l moderate clavicular wasting and b/l mild orbital wasting.  ~12% weight loss x2-3 weeks d/t inadequate energy intake and b/l fat and muscle wasting is consistent with severe malnutrition in the setting of acute illness.

## 2021-10-19 NOTE — PHYSICAL THERAPY INITIAL EVALUATION ADULT - PERTINENT HX OF CURRENT PROBLEM, REHAB EVAL
87 y/o F w PMH of breast cancer s/p mastectomy in 2018, radiation in 2019 who complains of progressive weakness and fatigue on exertion of 2-3 weeks with reduced PO intake and weight loss and found to have PE and positive UA. Admitted for further workup and management.

## 2021-10-19 NOTE — PHYSICAL THERAPY INITIAL EVALUATION ADULT - ADDITIONAL COMMENTS
Pt lives in an elevator accessible apartment alone, with no stairs to enter. She reports she was independent prior to admission, with help from her friend in the building at times. Pt denies any falls or use of DME/ADs.

## 2021-10-19 NOTE — PROGRESS NOTE ADULT - SUBJECTIVE AND OBJECTIVE BOX
***Note in progress***    OVERNIGHT EVENTS: NAEO    SUBJECTIVE / INTERVAL HPI: Patient seen and examined at bedside. Patient denying chest pain, SOB, palpitations, cough. Patient denies fever, chills, HA, Dizziness, change in vision/hearing, N/V, abdominal pain, diarrhea, constipation, hematochezia/melena, dysuria, hematuria, new onset weakness/numbness, LE pain and/or swelling.    Remaining ROS negative     PHYSICAL EXAM:  General: NAD, lying in bed comfortably  HEENT: NC/AT; PERRL, anicteric sclera; MMM  Neck: supple  Cardiovascular: +S1/S2, RRR  Respiratory: CTA B/L; no W/R/R  Gastrointestinal: soft, NT/ND; +BSx4  Extremities: WWP; no edema, clubbing or cyanosis  Vascular: 2+ radial, DP/PT pulses B/L  Neurological: AAOx3; no focal deficits  Psychiatric: pleasant mood and affect  Dermatologic: no appreciable wounds or damage to the skin    VITAL SIGNS:  Vital Signs Last 24 Hrs  T(C): 36.7 (19 Oct 2021 06:19), Max: 36.8 (18 Oct 2021 18:11)  T(F): 98 (19 Oct 2021 06:19), Max: 98.3 (18 Oct 2021 18:11)  HR: 74 (19 Oct 2021 06:19) (64 - 82)  BP: 113/67 (19 Oct 2021 06:19) (109/63 - 132/83)  BP(mean): 79 (18 Oct 2021 22:08) (79 - 79)  RR: 16 (19 Oct 2021 06:19) (16 - 18)  SpO2: 97% (19 Oct 2021 06:19) (97% - 99%)    MEDICATIONS:  MEDICATIONS  (STANDING):  cefTRIAXone   IVPB 1000 milliGRAM(s) IV Intermittent every 24 hours  heparin  Infusion. 900 Unit(s)/Hr (9 mL/Hr) IV Continuous <Continuous>    MEDICATIONS  (PRN):  acetaminophen   Tablet .. 650 milliGRAM(s) Oral every 6 hours PRN Temp greater or equal to 38C (100.4F), Mild Pain (1 - 3)      ALLERGIES:  Allergies    No Known Allergies    Intolerances        LABS:                        10.5   6.75  )-----------( 226      ( 19 Oct 2021 08:57 )             31.8     10-19    137  |  100  |  16  ----------------------------<  66<L>  3.0<L>   |  20<L>  |  0.53    Ca    8.5      19 Oct 2021 00:39  Phos  2.7     10-18  Mg     2.1     10-18    TPro  6.5  /  Alb  3.2<L>  /  TBili  0.7  /  DBili  x   /  AST  21  /  ALT  10  /  AlkPhos  143<H>  10-18    PT/INR - ( 18 Oct 2021 16:31 )   PT: 13.5 sec;   INR: 1.13          PTT - ( 19 Oct 2021 08:57 )  PTT:91.0 sec  Urinalysis Basic - ( 18 Oct 2021 15:32 )    Color: Yellow / Appearance: Cloudy / SG: >=1.030 / pH: x  Gluc: x / Ketone: >=80 mg/dL  / Bili: Negative / Urobili: 0.2 E.U./dL   Blood: x / Protein: 100 mg/dL / Nitrite: POSITIVE   Leuk Esterase: Small / RBC: < 5 /HPF / WBC Many /HPF   Sq Epi: x / Non Sq Epi: 5-10 /HPF / Bacteria: Many /HPF      CAPILLARY BLOOD GLUCOSE          RADIOLOGY & ADDITIONAL TESTS: Reviewed. OVERNIGHT EVENTS: NAEO    SUBJECTIVE / INTERVAL HPI: Patient seen and examined at bedside. Doing fine. She is able to walk tachycardic to 120s.    Remaining ROS negative     PHYSICAL EXAM:  GEN - Elderly female lying flat, appears comfortable on RA  HEENT - NCAT, EOMI, no teeth  RESP - CTA BL, no wheeze/stridor/rhonchi/crackles. not on supplemental O2.  CARDIO - NS1S2, RRR. No murmurs/rubs/gallops.  ABD - Soft/Non tender/Non distended  Ext - No CHEVY. no signs of venous/arterial stasis ulcers, warm and well perfused  Neuro - cn 2-12 grossly intact.  no tremor. gait not observed. strength 5/5 in upper extremities, and 3-4/5 in LE  Skin - clean, dry, intact. no rashes or lesions.    Psych- AAOx3. appropriate behaviour. attentive. normal affect.    VITAL SIGNS:  Vital Signs Last 24 Hrs  T(C): 36.7 (19 Oct 2021 06:19), Max: 36.8 (18 Oct 2021 18:11)  T(F): 98 (19 Oct 2021 06:19), Max: 98.3 (18 Oct 2021 18:11)  HR: 74 (19 Oct 2021 06:19) (64 - 82)  BP: 113/67 (19 Oct 2021 06:19) (109/63 - 132/83)  BP(mean): 79 (18 Oct 2021 22:08) (79 - 79)  RR: 16 (19 Oct 2021 06:19) (16 - 18)  SpO2: 97% (19 Oct 2021 06:19) (97% - 99%)    MEDICATIONS:  MEDICATIONS  (STANDING):  cefTRIAXone   IVPB 1000 milliGRAM(s) IV Intermittent every 24 hours  heparin  Infusion. 900 Unit(s)/Hr (9 mL/Hr) IV Continuous <Continuous>    MEDICATIONS  (PRN):  acetaminophen   Tablet .. 650 milliGRAM(s) Oral every 6 hours PRN Temp greater or equal to 38C (100.4F), Mild Pain (1 - 3)    ALLERGIES:  No Known Allergies    LABS:                        10.5   6.75  )-----------( 226      ( 19 Oct 2021 08:57 )             31.8     10-19    137  |  100  |  16  ----------------------------<  66<L>  3.0<L>   |  20<L>  |  0.53    Ca    8.5      19 Oct 2021 00:39  Phos  2.7     10-18  Mg     2.1     10-18    TPro  6.5  /  Alb  3.2<L>  /  TBili  0.7  /  DBili  x   /  AST  21  /  ALT  10  /  AlkPhos  143<H>  10-18    PT/INR - ( 18 Oct 2021 16:31 )   PT: 13.5 sec;   INR: 1.13          PTT - ( 19 Oct 2021 08:57 )  PTT:91.0 sec  Urinalysis Basic - ( 18 Oct 2021 15:32 )    Color: Yellow / Appearance: Cloudy / SG: >=1.030 / pH: x  Gluc: x / Ketone: >=80 mg/dL  / Bili: Negative / Urobili: 0.2 E.U./dL   Blood: x / Protein: 100 mg/dL / Nitrite: POSITIVE   Leuk Esterase: Small / RBC: < 5 /HPF / WBC Many /HPF   Sq Epi: x / Non Sq Epi: 5-10 /HPF / Bacteria: Many /HPF      CAPILLARY BLOOD GLUCOSE          RADIOLOGY & ADDITIONAL TESTS: Reviewed.

## 2021-10-19 NOTE — DIETITIAN INITIAL EVALUATION ADULT. - OTHER CALCULATIONS
ABW used to calculate energy needs due to pt's current body weight within % IBW (107.5%).  Needs calculated for age and increased secondary to criteria consistent with severe malnutrition.

## 2021-10-19 NOTE — DIETITIAN INITIAL EVALUATION ADULT. - PROBLEM SELECTOR PLAN 5
Fluids: 500cc NS, will start LR 80cc/hr for 12 hours  Electrolytes: Mg>2, K>4  Nutrition: replete lytes PRN  Prophylaxis: heparin gtt  Activity: AAT, OOBTC  GI: none  C: FC  Dispo: Admit to [facility]

## 2021-10-19 NOTE — CONSULT NOTE ADULT - SUBJECTIVE AND OBJECTIVE BOX
Patient is a 86y old  Female who presents with a chief complaint of pulmonary embolism (19 Oct 2021 10:55)       HPI:  HPI: 85 y/o F w PMH of breast cancer s/p mastectomy in 2018, radiation in 2019 who complains of weakness of 2-3 weeks. She further characterizes this as shakiness and fatigue whenever she gets up to walk. It got to the point where she would tire with 2-3 steps. She feels her gait is slower than usual. She has some dyspnea on exertion but no dizziness, syncope, falls, CP or palpitations. No orthopnea or leg swelling. She also describes a reduced appetite, reduced PO intake, and weight loss of 20 pounds over the past several weeks. For about one week she has also complained of thirst, requiring her to get up at night to drink water w reduced urination, no dysuria, fevers, or chills. She is vaccinated and has not gotten covid.    In the ED:  Initial vital signs: T: 98 F, HR: 82, BP: 132/83, R: 16, SpO2: 97% on RA  ED course:   Labs: significant for Bicarb 21, AG of 22, , BNP 1110,   Trop 0.01, lactate 1.0  UA: Cloudy, Many WBC, Nitrite+, small LE  Imaging:  CT head: negative for bleed or CVA  CT chest, abd, pelvis: PE in R middle and lower lobe, bladder wall thickening suggesting UTI  CXR: clear lungs  EKG: NSR w PVCs  Medications: Ceftriaxone 1g, heparin 500U IV push, heparin gtt, 500cc NS  Consults: none      (18 Oct 2021 18:11)      PAST MEDICAL & SURGICAL HISTORY:  Left breast mass    Breast cancer  Left    History of tonsillectomy    S/P left mastectomy        MEDICATIONS  (STANDING):  cefTRIAXone   IVPB 1000 milliGRAM(s) IV Intermittent every 24 hours  heparin  Infusion. 900 Unit(s)/Hr (9 mL/Hr) IV Continuous <Continuous>    MEDICATIONS  (PRN):  acetaminophen   Tablet .. 650 milliGRAM(s) Oral every 6 hours PRN Temp greater or equal to 38C (100.4F), Mild Pain (1 - 3)      FAMILY HISTORY:  No pertinent family history in first degree relatives        CBC Full  -  ( 19 Oct 2021 08:57 )  WBC Count : 6.75 K/uL  RBC Count : 3.85 M/uL  Hemoglobin : 10.5 g/dL  Hematocrit : 31.8 %  Platelet Count - Automated : 226 K/uL  Mean Cell Volume : 82.6 fl  Mean Cell Hemoglobin : 27.3 pg  Mean Cell Hemoglobin Concentration : 33.0 gm/dL  Auto Neutrophil # : x  Auto Lymphocyte # : x  Auto Monocyte # : x  Auto Eosinophil # : x  Auto Basophil # : x  Auto Neutrophil % : x  Auto Lymphocyte % : x  Auto Monocyte % : x  Auto Eosinophil % : x  Auto Basophil % : x      10-19    137  |  100  |  16  ----------------------------<  66<L>  3.0<L>   |  20<L>  |  0.53    Ca    8.5      19 Oct 2021 00:39  Phos  2.7     10-18  Mg     2.1     10-18    TPro  6.5  /  Alb  3.2<L>  /  TBili  0.7  /  DBili  x   /  AST  21  /  ALT  10  /  AlkPhos  143<H>  10-18      Urinalysis Basic - ( 18 Oct 2021 15:32 )    Color: Yellow / Appearance: Cloudy / SG: >=1.030 / pH: x  Gluc: x / Ketone: >=80 mg/dL  / Bili: Negative / Urobili: 0.2 E.U./dL   Blood: x / Protein: 100 mg/dL / Nitrite: POSITIVE   Leuk Esterase: Small / RBC: < 5 /HPF / WBC Many /HPF   Sq Epi: x / Non Sq Epi: 5-10 /HPF / Bacteria: Many /HPF          Radiology:    < from: CT Chest w/ IV Cont (10.18.21 @ 14:53) >    EXAM:  CT CHEST IC                          EXAM:  CT ABDOMEN AND PELVIS IC                          PROCEDURE DATE:  10/18/2021          INTERPRETATION:  CT of the CHEST, ABDOMEN and PELVIS with intravenous contrast dated 10/18/2021 2:53 PM    INDICATION: eval mass/cancer    TECHNIQUE: CT of the chest, abdomen and pelvis was performed. Axial and coronal  and sagittal  images were produced and reviewed.    CONTRAST USAGE:  IV contrast: Isovue-370: 100 ml administered; ml discarded.  Oral contrast: Not administered.    PRIOR STUDIES: CT of chest abdomen pelvis 2/1/2019. CT chest 2/3/2020.    FINDINGS: No thoracic aortic aneurysm. Prominent main pulmonary artery measuring 3.4 cm. Right pulmonary artery measures 2.6 cm. Left pulmonary artery measures 2.5 cm. There is evidence of acute PE involving right lower lobe lobar and segmental pulmonary arteries as well as the right middle lobe segmental pulmonary arteries.. The heart is normal in size. There appears to be RV strain. No pericardial effusion is seen.  No mediastinal, hilar or axillary lymphadenopathy is seen. Large hiatal hernia. Status post left mastectomy. Subcentimeter nodule right lobe of thyroid gland.    Evaluation of the pulmonary parenchyma demonstrates chronic radiation pneumonitis in the left upper lobe predominantly anterior segment-new as compared to the prior study. Centrilobular groundglass nodules in the right upper lobe similar to prior study likely due to small airways disease/bronchiolitis. 4 mm subpleural nodule in the posterior segment right upper lobe unchanged. No pleural effusions are seen.    Images of the upper abdomen demonstrate fatty infiltration of liver. 0.8 cm hypodensity in segment six of liver cannot be further characterized- but probable cyst versus biliary hamartoma. Was not definitively present on the prior study..   No radiopaque stones are seen in the gallbladder.  The pancreas is normal in appearance.  No splenic abnormalities are seen.    The adrenal glands are unremarkable. Thekidneys are normal in appearance. Mild ectasia of the infrarenal abdominal aorta measuring up to 2.8 cm transverse diameter.. No lymphadenopathy is seen.    Evaluation of the bowel is limited due to lack of oral contrast material. No bowel obstruction. Periampullary duodenal diverticulum.. Colonic diverticulosis. No ascites is seen.    Images of the pelvis demonstrate probable tiny atrophic postmenopausal uterus. Normal ovaries. Bladder shows wall thickening with perivesical fat infiltration suggesting cystitis/UTI. Bladder is mildly distended. Small fat-containing right  inguinal hernia.    Evaluation of the osseous structures demonstrates degenerative changes.  OA of the bilateral hips. Diffuse decrease in bony mineralization. Accentuated thoracic kyphosis.    IMPRESSION: Acute PE right middle lobe and right lower lobe as described above.  Status post left mastectomy. Chronic radiation pneumonitis left upper lobe.  Suspected cystitis.        < from: CT Head No Cont (10.18.21 @ 14:52) >    EXAM:  CT BRAIN                          PROCEDURE DATE:  10/18/2021          INTERPRETATION:  Clinical history/reason for exam: Weakness, history of breast cancer    Technique: Multiple contiguous axial CT images of the head were obtained from thebase of the skull to the vertex without the administration of intravenous contrast. Coronal and sagittal reformatted images were obtained.    Comparison:None    Findings:    The ventricles and sulci are prominent consistent with parenchymal volume loss.    No acute intracranial hemorrhage, extra-axial fluid collection, mass effect or midline shift..    Gray-white matter differentiation is preserved.    The bones of the calvarium are intact.    The paranasal sinuses and bilateral mastoid complexesare well aerated. There is cerumen within the bilateral external auditory canals.    Impression:    No acute intracranial hemorrhage, mass effect or demarcated territorial infarction. .              Vital Signs Last 24 Hrs  T(C): 36.7 (19 Oct 2021 06:19), Max: 36.8 (18 Oct 2021 18:11)  T(F): 98 (19 Oct 2021 06:19), Max: 98.3 (18 Oct 2021 18:11)  HR: 74 (19 Oct 2021 06:19) (64 - 75)  BP: 113/67 (19 Oct 2021 06:19) (109/63 - 126/78)  BP(mean): 79 (18 Oct 2021 22:08) (79 - 79)  RR: 16 (19 Oct 2021 06:19) (16 - 18)  SpO2: 97% (19 Oct 2021 06:19) (97% - 99%)        REVIEW OF SYSTEMS:    CONSTITUTIONAL: as per HPI          Physical Exam:  87 yo woman lying in semi Almeida's position, awake, alert, c/o fatigue    Head: normocephalic, atraumatic    Eyes: PERRLA, EOMI, no nystagmus, sclera anicteric    ENT: nasal discharge, uvula midline, no oropharyngeal erythema/exudate    Neck: supple, negative JVD, negative carotid bruits, no thyromegaly    Chest: CTA bilaterally, neg wheeze/ rhonchi/ rales/ crackles/ egophany    Cardiovascular: regular rate and rhythm, neg murmurs/rubs/gallops    Abdomen: soft, non distended, non tender to palpation in all 4 quadrants, negative rebound/guarding, normal bowel sounds    Extremities: WWP, neg cyanosis/clubbing/edema, negative calf tenderness to palpation, negative Carlos Eduardo's sign    Neurologic Exam:    Alert and oriented to person, place, date/year, speech fluent w/o dysarthria    Cranial Nerves:     II:                         pupils equal, round and reactive to light, visual fields intact   III/ IV/VI:               extraocular movements intact, neg nystagmus, neg ptosis  V:                        facial sensation intact, V1-3 normal  VII:                      face symmetric, no droop, normal eye closure and smile  VIII:                     hearing intact to finger rub bilaterally  IX and X:             no hoarseness, gag intact, palate/ uvula rise symmetrically  XI:                       SCM/ trapezius strength intact bilateral  XII:                      no tongue deviation    Motor Exam:    Right UE:             no focal weakness > 3+/5    Left UE:                no focal weakness > 3+/5      Right LE:              no focal weakness > 3+/5    Left LE:                no focal weakness > 3+/5               Sensation:           intact to light touch x 4 extremities                                                  DTR:                  biceps/brachioradialis: equal                                                     patella/ankle: equal                                                        neg clonus                           neg Babinski        Gait:  not tested        PM&R Impression:    1) deconditioned  2) no focal weakness    Recommendations/ Plan :    1) Physical therapy focusing on therapeutic exercises, bed mobility/transfer out of bed evaluation, progressive ambulation with assistive devices prn.    2) Anticipated Disposition Plan/Recs:   pending functional progress

## 2021-10-19 NOTE — DIETITIAN INITIAL EVALUATION ADULT. - PERSON TAUGHT/METHOD
Encouraged increased PO intake, as tolerated, to better meet nutrient requirements.  Encouraged pt to consume protein-rich food sources off tray first to optimize PO intake.  Encouraged ONS between meals to supplement PO intake./patient instructed

## 2021-10-19 NOTE — PROGRESS NOTE ADULT - ATTENDING COMMENTS
Patient was seen and examined with the resident team today.  I agree with Dr. 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      #Multiple PE's - risk factor unclear; CT with evidence of RV stain; need to ambulate to assess vitals; f/u TTE and LE dopplers; transition to DOAC once confident that no intervention will be indicated; pending collateral may warrant an outpatient eval for recurrent v new malignancy as well as full hypercoagulable work-up   #L DCIS - former pt of Dr. Beasley but lost to follow-up, last mammogram 2019; Oncology consult to plug her back into care   #Starvation ketosis - resolved overnight, Nutrition consult   #Acute cystitis - U/A dirty but CTAP c/f cystitis; c/w CTX 1g daily x 3 days, f/u urine culture   #DVT PPx - on heparin gtt and will transitioned to DOAC   #Dispo - needs PT eval     Tran Whitten  [phone] Patient was seen and examined with the resident team today.  I agree with Dr. 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      #Multiple PE's - risk factor unclear; CT with evidence of RV stain; need to ambulate to assess vitals; f/u TTE and LE dopplers; transition to DOAC once confident that no intervention will be indicated; pending collateral may warrant an outpatient eval for recurrent v new malignancy as well as full hypercoagulable work-up   #L DCIS - former pt of Dr. Beasley but lost to follow-up, last mammogram 2019; Oncology consult to plug her back into care   #Starvation ketosis - resolved overnight, Nutrition consult   #Acute cystitis - U/A dirty but CTAP c/f cystitis; c/w CTX 1g daily x 3 days, f/u urine culture   #Osteoporosis - confirmed on Dexa in 2019; would ensure f/u with PCP to address maintenance treatment   #DVT PPx - on heparin gtt and will transitioned to DOAC   #Dispo - needs PT eval     Tran Whitten  [phone]

## 2021-10-19 NOTE — DIETITIAN INITIAL EVALUATION ADULT. - PROBLEM SELECTOR PLAN 1
Submassive PE: Patient presented with complaints of progressive fatigue on exertion and mild dyspnea. She's hemodynamically stable and not hypoxic but did have elevated BNP and evidence of R heart strain on CT. Unclear if PE contributed to her fatigue, or if she has been more sedentary lately due to fatigue which contributed to PE.   - given heparin bolus and started on heparin gtt  - monitor for hemodynamic stability and signs of cardiac ischemia  - consider switching to eliquis in am  - f/u LE US for DVT  - f/u midnight BNP    ADD: followup BMP for anion gap

## 2021-10-19 NOTE — PROGRESS NOTE ADULT - PROBLEM SELECTOR PLAN 1
Submassive PE: Patient presented with complaints of progressive fatigue on exertion and mild dyspnea. She's hemodynamically stable and not hypoxic but did have elevated BNP and evidence of R heart strain on CT. Unclear if PE contributed to her fatigue, or if she has been more sedentary lately due to fatigue which contributed to PE.   - given heparin bolus and started on heparin gtt  - monitor for hemodynamic stability and signs of cardiac ischemia  - consider switching to eliquis in am  - f/u LE US for DVT  - f/u midnight BNP    ADD: followup BMP for anion gap Submassive PE: Patient presented with complaints of progressive fatigue on exertion and mild dyspnea. She's hemodynamically stable and not hypoxic but did have elevated BNP and evidence of R heart strain on CT. Unclear if PE contributed to her fatigue, or if she has been more sedentary lately due to fatigue which contributed to PE.  LE US for DVT unremarkable. BNP 1602. Anion gap closed.  PLAN:  - Transitioned from hepain gtt (5 pm hold) to apixaban (6 pm start).  - monitor for hemodynamic stability and signs of cardiac ischemia.

## 2021-10-19 NOTE — PROGRESS NOTE ADULT - PROBLEM SELECTOR PLAN 3
Likely 2/2 starvation ketoacidosis in setting of reduced PO intake.  - f/u B hydroxy  - rehydrate per above plan Likely 2/2 starvation ketoacidosis in setting of reduced PO intake. Hydrated. Resolved.

## 2021-10-19 NOTE — DIETITIAN INITIAL EVALUATION ADULT. - OTHER INFO
Pt with past medical history significant for breast CA s/p mastectomy (2018) and RT (2019).  Pt presents with progressive weakness and fatigue x2-3 weeks with associated decreased PO intake and weight loss.  Pt found with PE and (+) UA.    Pt seen in bed eating lunch.  Pt had consumed ~75% of chichi food cake and ~30% of salmon.  Pt endorses liking desserts and that she would have eaten more of the salmon if she was feeling better because she was really enjoying it.  Commended pt for eating the protein-rich food on her plate and encouraged her to optimize on times when she is feeling up to eating and to eat the protein sources first.  Pt endorses good appetite/intake at baseline, however, with significant decrease in PO intake over the last 2-3 weeks.  Pt endorses UBW ~159lbs.  Pt said she stopped checking the scale when her weight dropped into the 140s.  Pt is currently ~140lbs.  Additionally, pt is noted with b/l moderate clavicular wasting and b/l mild orbital wasting.  ~12% weight loss x2-3 weeks d/t inadequate energy intake and b/l fat and muscle wasting is consistent with severe malnutrition in the setting of acute illness.  Pt denies n/v/d/c or pain at this time.  Preferences obtained and documented in  software.

## 2021-10-19 NOTE — PHYSICAL THERAPY INITIAL EVALUATION ADULT - GAIT PATTERN USED, PT EVAL
Pt ambulates with waddling gait. No LOB noted, however unsteady. Pt may benefit from cane, will assess in further session./2-point gait

## 2021-10-19 NOTE — PROGRESS NOTE ADULT - PROBLEM SELECTOR PLAN 2
Pt complains of shakiness and fatigue w exertion of 2-3 weeks associated with poor PO intake. DDx includes: Deconditioning due to poor PO intake, Hypothyroidism, less likely CHF, malignancy, parkinsons  Poor PO intake leading to dehydration and fatigue would explain her concentrated, urine and thirst. furthermore could lead to a ketoacidosis, and this pt has an elevated AG w normal sugar and no polyuria. Less likely CHF as pt euvolemic on exam although does have elevated BNP in setting of PE. No lesions on CT A/P. Pt describes slower gate but has no tremor, masked facies, or cogwheel rigidity.  - s/p 500cc NS  - start 80cc/hr of LR for 12 hours  - f/u TSH  - f/u echo  - Start treatment for PE, rehydrate, and reassess Pt complains of shakiness and fatigue w exertion of 2-3 weeks associated with poor PO intake. DDx includes: Deconditioning due to poor PO intake, Hypothyroidism, less likely CHF, malignancy, parkinsons  Poor PO intake leading to dehydration and fatigue would explain her concentrated, urine and thirst. furthermore could lead to a ketoacidosis, and this pt has an elevated AG w normal sugar and no polyuria. Less likely CHF as pt euvolemic on exam although does have elevated BNP in setting of PE. No lesions on CT A/P. Pt describes slower gate but has no tremor, masked facies, or cogwheel rigidity.  PLAN:  - start 80cc/hr of LR for 12 hours  - f/u TSH  - f/u TTE

## 2021-10-20 ENCOUNTER — TRANSCRIPTION ENCOUNTER (OUTPATIENT)
Age: 86
End: 2021-10-20

## 2021-10-20 DIAGNOSIS — N39.0 URINARY TRACT INFECTION, SITE NOT SPECIFIED: ICD-10-CM

## 2021-10-20 DIAGNOSIS — I82.409 ACUTE EMBOLISM AND THROMBOSIS OF UNSPECIFIED DEEP VEINS OF UNSPECIFIED LOWER EXTREMITY: ICD-10-CM

## 2021-10-20 LAB
-  AMPICILLIN/SULBACTAM: SIGNIFICANT CHANGE UP
-  AMPICILLIN: SIGNIFICANT CHANGE UP
-  CEFAZOLIN: SIGNIFICANT CHANGE UP
-  CEFTRIAXONE: SIGNIFICANT CHANGE UP
-  CIPROFLOXACIN: SIGNIFICANT CHANGE UP
-  ERTAPENEM: SIGNIFICANT CHANGE UP
-  GENTAMICIN: SIGNIFICANT CHANGE UP
-  NITROFURANTOIN: SIGNIFICANT CHANGE UP
-  PIPERACILLIN/TAZOBACTAM: SIGNIFICANT CHANGE UP
-  TOBRAMYCIN: SIGNIFICANT CHANGE UP
-  TRIMETHOPRIM/SULFAMETHOXAZOLE: SIGNIFICANT CHANGE UP
CULTURE RESULTS: SIGNIFICANT CHANGE UP
FERRITIN SERPL-MCNC: 446 NG/ML — HIGH (ref 15–150)
HCT VFR BLD CALC: 31.5 % — LOW (ref 34.5–45)
HGB BLD-MCNC: 10.2 G/DL — LOW (ref 11.5–15.5)
IRON SATN MFR SERPL: 27 % — SIGNIFICANT CHANGE UP (ref 14–50)
IRON SATN MFR SERPL: 34 UG/DL — SIGNIFICANT CHANGE UP (ref 30–160)
MCHC RBC-ENTMCNC: 27.3 PG — SIGNIFICANT CHANGE UP (ref 27–34)
MCHC RBC-ENTMCNC: 32.4 GM/DL — SIGNIFICANT CHANGE UP (ref 32–36)
MCV RBC AUTO: 84.5 FL — SIGNIFICANT CHANGE UP (ref 80–100)
METHOD TYPE: SIGNIFICANT CHANGE UP
NRBC # BLD: 0 /100 WBCS — SIGNIFICANT CHANGE UP (ref 0–0)
ORGANISM # SPEC MICROSCOPIC CNT: SIGNIFICANT CHANGE UP
ORGANISM # SPEC MICROSCOPIC CNT: SIGNIFICANT CHANGE UP
PLATELET # BLD AUTO: 210 K/UL — SIGNIFICANT CHANGE UP (ref 150–400)
RBC # BLD: 3.72 M/UL — LOW (ref 3.8–5.2)
RBC # BLD: 3.73 M/UL — LOW (ref 3.8–5.2)
RBC # FLD: 15.1 % — HIGH (ref 10.3–14.5)
RETICS #: 29.8 K/UL — SIGNIFICANT CHANGE UP (ref 25–125)
RETICS/RBC NFR: 0.8 % — SIGNIFICANT CHANGE UP (ref 0.5–2.5)
SPECIMEN SOURCE: SIGNIFICANT CHANGE UP
TIBC SERPL-MCNC: 128 UG/DL — LOW (ref 220–430)
TSH SERPL-MCNC: 2.43 UIU/ML — SIGNIFICANT CHANGE UP (ref 0.27–4.2)
UIBC SERPL-MCNC: 94 UG/DL — LOW (ref 110–370)
WBC # BLD: 5.2 K/UL — SIGNIFICANT CHANGE UP (ref 3.8–10.5)
WBC # FLD AUTO: 5.2 K/UL — SIGNIFICANT CHANGE UP (ref 3.8–10.5)

## 2021-10-20 PROCEDURE — 99232 SBSQ HOSP IP/OBS MODERATE 35: CPT

## 2021-10-20 PROCEDURE — 93306 TTE W/DOPPLER COMPLETE: CPT | Mod: 26

## 2021-10-20 RX ORDER — APIXABAN 2.5 MG/1
2 TABLET, FILM COATED ORAL
Qty: 28 | Refills: 0
Start: 2021-10-20 | End: 2021-10-26

## 2021-10-20 RX ORDER — APIXABAN 2.5 MG/1
2 TABLET, FILM COATED ORAL
Qty: 0 | Refills: 0 | DISCHARGE
Start: 2021-10-20

## 2021-10-20 RX ADMIN — APIXABAN 10 MILLIGRAM(S): 2.5 TABLET, FILM COATED ORAL at 18:48

## 2021-10-20 RX ADMIN — CEFTRIAXONE 100 MILLIGRAM(S): 500 INJECTION, POWDER, FOR SOLUTION INTRAMUSCULAR; INTRAVENOUS at 16:40

## 2021-10-20 RX ADMIN — APIXABAN 10 MILLIGRAM(S): 2.5 TABLET, FILM COATED ORAL at 06:35

## 2021-10-20 NOTE — OCCUPATIONAL THERAPY INITIAL EVALUATION ADULT - GENERAL OBSERVATIONS, REHAB EVAL
OT IE Completed. Pt received sitting EOB with PTKristie. On RA, +heplock, NAD, pt agreeable to OT. Pt tolerated session fairly, performed LB dressing CGA, bed mobility CGA, sit to stand transfer Min A and ambulation CGA.

## 2021-10-20 NOTE — DISCHARGE NOTE PROVIDER - NSDCFUSCHEDAPPT_GEN_ALL_CORE_FT
ABRAHAM CHANDLER ; 11/01/2021 ; [facility] [address] ABRAHAM CHANDLER ; 10/25/2021 ; [facility]  [address]  ABRAHAM CHANDLER ; 11/01/2021 ; [facility] [address] ABRAHAM CHANDLER ; 10/25/2021 ; [facility]  [address]  ABRAHAM CHANDLER ; 11/16/2021 ; [facility] [address]

## 2021-10-20 NOTE — DISCHARGE NOTE PROVIDER - NSDCMRMEDTOKEN_GEN_ALL_CORE_FT
apixaban 5 mg oral tablet: M2B  7Wo 742/01  Anticipated d/c 10/21/2021 @15:00  [phone]    2 tab(s) orally every 12 hours  Eliquis 5 mg oral tablet: 1 tab(s) orally 2 times a day    Eliquis Starter Pack for Treatment of DVT and PE 5 mg oral tablet: 1 tab(s) orally 2 times a day     Please take 2 tabs twice daily for 7 days and then take 1 tab twice daily. You will be skipping the first 2 days as you have already received those doses while in the hospital.   folic acid 1 mg oral tablet: 1 tab(s) orally once   Eliquis Starter Pack for Treatment of DVT and PE 5 mg oral tablet: 1 tab(s) orally 2 times a day     Please take 2 tabs twice daily for 5 days and then take 1 tab twice daily; stop on October 26. You will be skipping the first 2 days as you have already received those doses while in the hospital for the next 25 days thereafter.   folic acid 1 mg oral tablet: 1 tab(s) orally once

## 2021-10-20 NOTE — CONSULT NOTE ADULT - SUBJECTIVE AND OBJECTIVE BOX
LENGTH OF HOSPITAL STAY: 2d    CHIEF COMPLAINT:   Patient is a 86y old  Female who presents with a chief complaint of pulmonary embolism (20 Oct 2021 06:52)    HISTORY OF PRESENTING ILLNESS:   HPI: 87 y/o F w PMH of breast cancer s/p mastectomy in 2018, radiation in 2019 who complains of weakness of 2-3 weeks. She further characterizes this as shakiness and fatigue whenever she gets up to walk. It got to the point where she would tire with 2-3 steps. She feels her gait is slower than usual. She has some dyspnea on exertion but no dizziness, syncope, falls, CP or palpitations. No orthopnea or leg swelling. She also describes a reduced appetite, reduced PO intake, and weight loss of 20 pounds over the past several weeks. For about one week she has also complained of thirst, requiring her to get up at night to drink water w reduced urination, no dysuria, fevers, or chills. She is vaccinated and has not gotten covid.    PAST MEDICAL & SURGICAL HISTORY:  Left breast mass  Breast cancer  Left  History of tonsillectomy  S/P left mastectomy    ALLERGIES:  No Known Allergies    MEDICATIONS:  STANDING MEDICATIONS  apixaban 10 milliGRAM(s) Oral every 12 hours  cefTRIAXone   IVPB 1000 milliGRAM(s) IV Intermittent every 24 hours    PRN MEDICATIONS  acetaminophen   Tablet .. 650 milliGRAM(s) Oral every 6 hours PRN    VITALS:   T(F): 97.8  HR: 78  BP: 119/75  RR: 16  SpO2: 97%    LABS:                        10.2   5.20  )-----------( 210      ( 20 Oct 2021 07:12 )             31.5     10-19    137  |  100  |  16  ----------------------------<  66<L>  3.0<L>   |  20<L>  |  0.53    Ca    8.5      19 Oct 2021 00:39  Phos  2.7     10-18  Mg     2.1     10-18    TPro  6.5  /  Alb  3.2<L>  /  TBili  0.7  /  DBili  x   /  AST  21  /  ALT  10  /  AlkPhos  143<H>  10-18    PT/INR - ( 18 Oct 2021 16:31 )   PT: 13.5 sec;   INR: 1.13       PTT - ( 19 Oct 2021 11:28 )  PTT:97.5 sec  Urinalysis Basic - ( 18 Oct 2021 15:32 )    Color: Yellow / Appearance: Cloudy / SG: >=1.030 / pH: x  Gluc: x / Ketone: >=80 mg/dL  / Bili: Negative / Urobili: 0.2 E.U./dL   Blood: x / Protein: 100 mg/dL / Nitrite: POSITIVE   Leuk Esterase: Small / RBC: < 5 /HPF / WBC Many /HPF   Sq Epi: x / Non Sq Epi: 5-10 /HPF / Bacteria: Many /HPF    Culture - Urine (collected 18 Oct 2021 17:35)  Source: Clean Catch Clean Catch (Midstream)  Preliminary Report (19 Oct 2021 13:10):    >100,000 CFU/ml Escherichia coli    Susceptibility to follow.    CARDIAC MARKERS ( 18 Oct 2021 12:47 )  x     / 0.01 ng/mL / 28 U/L / x     / x        RADIOLOGY:  < from: US Duplex Venous Lower Ext Complete, Bilateral (10.18.21 @ 21:01) >  Right popliteal vein deep venous thrombosis as well as right calf vein thrombosis.    < end of copied text >    < from: CT Chest w/ IV Cont (10.18.21 @ 14:53) >  No thoracic aortic aneurysm. Prominent main pulmonary artery measuring 3.4 cm. Right pulmonary artery measures 2.6 cm. Left pulmonary artery measures 2.5 cm. There is evidence of acute PE involving right lower lobe lobar and segmental pulmonary arteries as well as the right middle lobe segmental pulmonary arteries.. The heart is normal in size. There appears to be RV strain. No pericardial effusion is seen.  No mediastinal, hilar or axillary lymphadenopathy is seen. Large hiatal hernia. Status post left mastectomy. Subcentimeter nodule right lobe of thyroid gland.    Evaluation of the pulmonary parenchyma demonstrates chronic radiation pneumonitis in the left upper lobe predominantly anterior segment-new as compared to the prior study. Centrilobular groundglass nodules in the right upper lobe similar to prior study likely due to small airways disease/bronchiolitis. 4 mm subpleural nodule in the posterior segment right upper lobe unchanged. No pleural effusions are seen.    Images of the upper abdomen demonstrate fatty infiltration of liver. 0.8 cm hypodensity in segment six of liver cannot be further characterized- but probable cyst versus biliary hamartoma. Was not definitively present on the prior study..   No radiopaque stones are seen in the gallbladder.  The pancreas is normal in appearance.  No splenic abnormalities are seen.    The adrenal glands are unremarkable. Thekidneys are normal in appearance. Mild ectasia of the infrarenal abdominal aorta measuring up to 2.8 cm transverse diameter.. No lymphadenopathy is seen.    Evaluation of the bowel is limited due to lack of oral contrast material. No bowel obstruction. Periampullary duodenal diverticulum.. Colonic diverticulosis. No ascites is seen.    Images of the pelvis demonstrate probable tiny atrophic postmenopausal uterus. Normal ovaries. Bladder shows wall thickening with perivesical fat infiltration suggesting cystitis/UTI. Bladder is mildly distended. Small fat-containing right  inguinal hernia.    Evaluation of the osseous structures demonstrates degenerative changes.  OA of the bilateral hips. Diffuse decrease in bony mineralization. Accentuated thoracic kyphosis.    IMPRESSION: Acute PE right middle lobe and right lower lobe as described above.  Status post left mastectomy. Chronic radiation pneumonitis left upper lobe.  Suspected cystitis.    < end of copied text >    < from: CT Head No Cont (10.18.21 @ 14:52) >  No acute intracranial hemorrhage, mass effect or demarcated territorial infarction. .    < end of copied text >    PHYSICAL EXAM:  GEN: No acute distress  HEENT:   LUNGS: Clear to auscultation bilaterally   HEART: S1/S2 present. RRR.   ABD: Soft, non-tender, non-distended. Bowel sounds present  EXT:  NEURO: AAOX3     LENGTH OF HOSPITAL STAY: 2d    CHIEF COMPLAINT:   Patient is a 86y old  Female who presents with a chief complaint of pulmonary embolism (20 Oct 2021 06:52)    HISTORY OF PRESENTING ILLNESS:   HPI: 87 y/o F w PMH of breast cancer s/p mastectomy in 2018, radiation in 2019 who complains of weakness of 2-3 weeks. She further characterizes this as shakiness and fatigue whenever she gets up to walk. It got to the point where she would tire with 2-3 steps. She feels her gait is slower than usual. She has some dyspnea on exertion but no dizziness, syncope, falls, CP or palpitations. No orthopnea or leg swelling. She also describes a reduced appetite, reduced PO intake, and weight loss of 20 pounds over the past several weeks. For about one week she has also complained of thirst, requiring her to get up at night to drink water w reduced urination, no dysuria, fevers, or chills. She is vaccinated and has not gotten covid.    PAST MEDICAL & SURGICAL HISTORY:  Left breast mass  Breast cancer  Left  History of tonsillectomy  S/P left mastectomy    ALLERGIES:  No Known Allergies    MEDICATIONS:  STANDING MEDICATIONS  apixaban 10 milliGRAM(s) Oral every 12 hours  cefTRIAXone   IVPB 1000 milliGRAM(s) IV Intermittent every 24 hours    PRN MEDICATIONS  acetaminophen   Tablet .. 650 milliGRAM(s) Oral every 6 hours PRN    VITALS:   T(F): 97.8  HR: 78  BP: 119/75  RR: 16  SpO2: 97%    LABS:                        10.2   5.20  )-----------( 210      ( 20 Oct 2021 07:12 )             31.5     10-19    137  |  100  |  16  ----------------------------<  66<L>  3.0<L>   |  20<L>  |  0.53    Ca    8.5      19 Oct 2021 00:39  Phos  2.7     10-18  Mg     2.1     10-18    TPro  6.5  /  Alb  3.2<L>  /  TBili  0.7  /  DBili  x   /  AST  21  /  ALT  10  /  AlkPhos  143<H>  10-18    PT/INR - ( 18 Oct 2021 16:31 )   PT: 13.5 sec;   INR: 1.13       PTT - ( 19 Oct 2021 11:28 )  PTT:97.5 sec  Urinalysis Basic - ( 18 Oct 2021 15:32 )    Color: Yellow / Appearance: Cloudy / SG: >=1.030 / pH: x  Gluc: x / Ketone: >=80 mg/dL  / Bili: Negative / Urobili: 0.2 E.U./dL   Blood: x / Protein: 100 mg/dL / Nitrite: POSITIVE   Leuk Esterase: Small / RBC: < 5 /HPF / WBC Many /HPF   Sq Epi: x / Non Sq Epi: 5-10 /HPF / Bacteria: Many /HPF    Culture - Urine (collected 18 Oct 2021 17:35)  Source: Clean Catch Clean Catch (Midstream)  Preliminary Report (19 Oct 2021 13:10):    >100,000 CFU/ml Escherichia coli    Susceptibility to follow.    CARDIAC MARKERS ( 18 Oct 2021 12:47 )  x     / 0.01 ng/mL / 28 U/L / x     / x        RADIOLOGY:  < from: US Duplex Venous Lower Ext Complete, Bilateral (10.18.21 @ 21:01) >  Right popliteal vein deep venous thrombosis as well as right calf vein thrombosis.    < end of copied text >    < from: CT Chest w/ IV Cont (10.18.21 @ 14:53) >  No thoracic aortic aneurysm. Prominent main pulmonary artery measuring 3.4 cm. Right pulmonary artery measures 2.6 cm. Left pulmonary artery measures 2.5 cm. There is evidence of acute PE involving right lower lobe lobar and segmental pulmonary arteries as well as the right middle lobe segmental pulmonary arteries.. The heart is normal in size. There appears to be RV strain. No pericardial effusion is seen.  No mediastinal, hilar or axillary lymphadenopathy is seen. Large hiatal hernia. Status post left mastectomy. Subcentimeter nodule right lobe of thyroid gland.    Evaluation of the pulmonary parenchyma demonstrates chronic radiation pneumonitis in the left upper lobe predominantly anterior segment-new as compared to the prior study. Centrilobular groundglass nodules in the right upper lobe similar to prior study likely due to small airways disease/bronchiolitis. 4 mm subpleural nodule in the posterior segment right upper lobe unchanged. No pleural effusions are seen.    Images of the upper abdomen demonstrate fatty infiltration of liver. 0.8 cm hypodensity in segment six of liver cannot be further characterized- but probable cyst versus biliary hamartoma. Was not definitively present on the prior study..   No radiopaque stones are seen in the gallbladder.  The pancreas is normal in appearance.  No splenic abnormalities are seen.    The adrenal glands are unremarkable. Thekidneys are normal in appearance. Mild ectasia of the infrarenal abdominal aorta measuring up to 2.8 cm transverse diameter.. No lymphadenopathy is seen.    Evaluation of the bowel is limited due to lack of oral contrast material. No bowel obstruction. Periampullary duodenal diverticulum.. Colonic diverticulosis. No ascites is seen.    Images of the pelvis demonstrate probable tiny atrophic postmenopausal uterus. Normal ovaries. Bladder shows wall thickening with perivesical fat infiltration suggesting cystitis/UTI. Bladder is mildly distended. Small fat-containing right  inguinal hernia.    Evaluation of the osseous structures demonstrates degenerative changes.  OA of the bilateral hips. Diffuse decrease in bony mineralization. Accentuated thoracic kyphosis.    IMPRESSION: Acute PE right middle lobe and right lower lobe as described above.  Status post left mastectomy. Chronic radiation pneumonitis left upper lobe.  Suspected cystitis.    < end of copied text >    < from: CT Head No Cont (10.18.21 @ 14:52) >  No acute intracranial hemorrhage, mass effect or demarcated territorial infarction. .    < end of copied text >    PHYSICAL EXAM:  GEN: No acute distress  HEENT: NCAT  CHEST: L mastectomy, R breast nodularity, no axillary lymphadenopathy  LUNGS: Clear to auscultation bilaterally   HEART: S1/S2 present. RRR.   ABD: Soft, non-tender, non-distended. Bowel sounds present  EXT: No pitting edema  NEURO: AAOX3

## 2021-10-20 NOTE — DISCHARGE NOTE PROVIDER - PROVIDER TOKENS
PROVIDER:[TOKEN:[[id number]]] PROVIDER:[TOKEN:[[id number]]],PROVIDER:[TOKEN:[[id number]],SCHEDULEDAPPT:[10/25/2021],SCHEDULEDAPPTTIME:[11:00 AM]] PROVIDER:[TOKEN:[[id number]],SCHEDULEDAPPT:[11/16/2021],SCHEDULEDAPPTTIME:[04:30 PM]],PROVIDER:[TOKEN:[[id number]],SCHEDULEDAPPT:[10/25/2021],SCHEDULEDAPPTTIME:[11:00 AM]]

## 2021-10-20 NOTE — DISCHARGE NOTE PROVIDER - NSDCCPCAREPLAN_GEN_ALL_CORE_FT
PRINCIPAL DISCHARGE DIAGNOSIS  Diagnosis: Pulmonary embolism  Assessment and Plan of Treatment: You were diagnosed with a pulmonary embolism based upon results from your CT chest scan. To help decrease your risk for further clot formation, you were placed on a heparin drip. Once your PTT levels became therapeutic, we transitioned you to an oral medication called Eliquis. You will be taking 10 mg twice a day for 7 days as your loading dose; then you need to take Eliquis 5 mg BID. Please visit your PCP at your next appointment to help further your management of care. If you continue to have shortness of breath, palpitations, or chest pain, please visit the closest ED to you.      SECONDARY DISCHARGE DIAGNOSES  Diagnosis: Fatigue  Assessment and Plan of Treatment: You were diagnosed with fatigue as you have not been eating very well for the past couple of weeks. You were given IV fluids to help with the fatigue. We encourage you to eat 3 well-balanced meals a day to avoid further complications of your fatigue.    Diagnosis: High anion gap metabolic acidosis  Assessment and Plan of Treatment: While in the hospital, it was found that you had a metabolic acidosis as you had decreased your food intake. We corrected this by giving you fluids and by you eating more food throughout the day.    Diagnosis: Deep vein thrombosis  Assessment and Plan of Treatment: Upon examination, it was found on ultrasound that you had blood clots in your R popliteal region (behind your knee) and R calf. The treatment for your PE is what was used to treat your DVTs. Please follow the medication regimen as above to avoid further DVT development. We also encourage you to walk around for a couple of minutes each hour to help with your blood flow.    Diagnosis: Acute UTI  Assessment and Plan of Treatment:      PRINCIPAL DISCHARGE DIAGNOSIS  Diagnosis: Pulmonary embolism  Assessment and Plan of Treatment: You were diagnosed with a pulmonary embolism based upon results from your CT chest scan. To help decrease your risk for further clot formation, you were placed on a heparin drip. Once your PTT levels became therapeutic, we transitioned you to an oral medication called Eliquis. You will be taking 10 mg twice a day for 5 days as your loading dose; then you need to take Eliquis 5 mg BID for 25 days. Please visit your PCP at your next appointment to help further your management of care. If you continue to have shortness of breath, palpitations, or chest pain, please visit the closest ED to you.      SECONDARY DISCHARGE DIAGNOSES  Diagnosis: Acute UTI  Assessment and Plan of Treatment:     Diagnosis: Fatigue  Assessment and Plan of Treatment: You were diagnosed with fatigue as you have not been eating very well for the past couple of weeks. You were given IV fluids to help with the fatigue. We encourage you to eat 3 well-balanced meals a day to avoid further complications of your fatigue.    Diagnosis: High anion gap metabolic acidosis  Assessment and Plan of Treatment: While in the hospital, it was found that you had a metabolic acidosis as you had decreased your food intake. We corrected this by giving you fluids and by you eating more food throughout the day.    Diagnosis: Deep vein thrombosis  Assessment and Plan of Treatment: Upon examination, it was found on ultrasound that you had blood clots in your R popliteal region (behind your knee) and R calf. The treatment for your PE is what was used to treat your DVTs. Please follow the medication regimen as above to avoid further DVT development. We also encourage you to walk around for a couple of minutes each hour to help with your blood flow.

## 2021-10-20 NOTE — OCCUPATIONAL THERAPY INITIAL EVALUATION ADULT - TRANSFER SAFETY CONCERNS NOTED: SIT/STAND, REHAB EVAL
decreased balance during turns/decreased safety awareness/losing balance decreased balance during turns/decreased safety awareness/decreased weight-shifting ability

## 2021-10-20 NOTE — OCCUPATIONAL THERAPY INITIAL EVALUATION ADULT - DIAGNOSIS, OT EVAL
Pt presents with impaired strength, balance, postural control, and functional mobility, negatively impacting pt's ability to perform ADL's.

## 2021-10-20 NOTE — OCCUPATIONAL THERAPY INITIAL EVALUATION ADULT - FINE MOTOR COORDINATION, LEFT HAND THUMB/FINGER OPPOSITION SKILLS, OT EVAL
Operative Note       Surgery Date: 10/7/2019     Surgeon(s) and Role:     * LAURENT Swanson MD - Primary    Pre-op Diagnosis:  Chronic maxillary sinusitis [J32.0]    Post-op Diagnosis: Post-Op Diagnosis Codes:     * Chronic maxillary sinusitis [J32.0]    Procedure(s) (LRB):  FESS, USING COMPUTER-ASSISTED NAVIGATION (Bilateral)  SINUPLASTY, USING BALLOON (Bilateral)  Balloon sinuplasty of the maxillary sinuses bilaterally    Anesthesia: General    Procedure in Detail/Findings:  The patient was placed on the operating table in supine position adequate general tracheal anesthesia was administered.  The face was prepped with fossa derm in the nasal passages packed with gauze cotton noise moistened with 1:1000 epinephrine.  The registration patch for the fusion stereotactic guidance system was placed on the forehead.  The patient was sterilely draped.  The navigation system was registered and verified.  A 0 degree telescope was coupled to a video system.  A 7 mm [employer] maxillary sinus balloon was coupled to the navigation system.  2 in x 1 0.5 in neurosurgical cottonoids moistened in epinephrine were placed medial and lateral to the middle turbinates bilaterally. Packs removed on the right side initially.  The middle turbinate was medialized and the balloon catheter was placed through the maxillary sinus ostium.  It was dilated and released twice in the actual sinus ostium and then partially withdrawn and dilated 1 additional time. A neurosurgical cottonoid was placed lateral to the middle turbinate. A similar procedure was performed on the left after removing that neurosurgical cottonoids.  There were 2 dilations in the its sinus ostium and then 1 and additional dilation as the balloon was partially withdrawn.  Neurosurgical cottonoid was placed lateral the middle turbinate. After 5 min time the turbinate packings were removed. There was no active bleeding and the procedure terminated.  Estimated blood loss 0 cc.   The no complications. Patient tolerated procedure well was discharged post procedure.  Estimated Blood Loss: * No values recorded between 10/7/2019 11:41 AM and 10/7/2019 12:24 PM *           Specimens (From admission, onward)    None        Implants: * No implants in log *           Disposition: PACU - hemodynamically stable.           Condition: Good    Attestation:  I performed the procedure.           Discharge Note    Admit Date: 10/7/2019    Attending Physician: LAURENT Swanson MD     Discharge Physician: LAURENT Swanson MD    Final Diagnosis: Post-Op Diagnosis Codes:     * Chronic maxillary sinusitis [J32.0]    Disposition: Home or Self Care    Patient Instructions:   Current Discharge Medication List      START taking these medications    Details   cephALEXin (KEFLEX) 500 MG capsule Take 1 capsule (500 mg total) by mouth 4 (four) times daily.  Qty: 40 capsule, Refills: 0      ondansetron (ZOFRAN-ODT) 8 MG TbDL Take 1 tablet (8 mg total) by mouth every 6 (six) hours as needed.  Qty: 6 tablet, Refills: 0         CONTINUE these medications which have NOT CHANGED    Details   albuterol (VENTOLIN HFA) 90 mcg/actuation inhaler Inhale 2 puffs into the lungs every 6 (six) hours as needed for Wheezing. Rescue  Qty: 18 g, Refills: 4      atorvastatin (LIPITOR) 40 MG tablet Take 1 tablet (40 mg total) by mouth once daily.  Qty: 90 tablet, Refills: 0    Associated Diagnoses: Hyperlipidemia, unspecified hyperlipidemia type      azelastine (ASTELIN) 137 mcg (0.1 %) nasal spray 1 spray (137 mcg total) by Nasal route 2 (two) times daily.  Qty: 30 mL, Refills: 11    Associated Diagnoses: Chronic seasonal allergic rhinitis, unspecified trigger      calcium citrate-vitamin D3 315-200 mg (CITRACAL+D) 315-200 mg-unit per tablet Take 1 tablet by mouth once daily.      cyanocobalamin, vitamin B-12, (VITAMIN B-12) 50 mcg tablet Take 50 mcg by mouth once daily.      docusate sodium (COLACE) 100 MG capsule Take 1 tablet by mouth  Twice daily. 1 Capsule Oral Twice a day .  Take with pain medicine      doxazosin (CARDURA) 1 MG tablet TAKE 1 TABLET(1 MG) BY MOUTH EVERY EVENING  Qty: 30 tablet, Refills: 3    Associated Diagnoses: Benign localized prostatic hyperplasia with lower urinary tract symptoms (LUTS)      esomeprazole (NEXIUM) 40 MG capsule Take 1 capsule (40 mg total) by mouth 2 (two) times daily before meals.  Qty: 60 capsule, Refills: 11      fluticasone (FLONASE) 50 mcg/actuation nasal spray 1 spray (50 mcg total) by Each Nare route once daily.  Qty: 16 g, Refills: 11    Associated Diagnoses: Non-seasonal allergic rhinitis, unspecified trigger      ipratropium (ATROVENT) 0.03 % nasal spray 2 sprays by Nasal route 2 (two) times daily. May be use more often if needed  Qty: 30 mL, Refills: 11      meclizine (ANTIVERT) 25 mg tablet Take 1 tablet (25 mg total) by mouth 3 (three) times daily as needed.  Qty: 20 tablet, Refills: 0      montelukast (SINGULAIR) 10 mg tablet Refills: 8      spironolactone (ALDACTONE) 50 MG tablet Take two 25 mg tabs once daily .  Qty: 90 tablet, Refills: 3      VITAMIN D2 50,000 unit capsule TK 1 C PO Q 7 DAYS  Refills: 1      zolpidem (AMBIEN) 10 mg Tab TAKE 1 TABLET BY MOUTH EVERY DAY AT BEDTIME  Qty: 20 tablet, Refills: 0    Associated Diagnoses: Sleep disorder      acetaminophen (TYLENOL) 500 MG tablet Take 2 tablets (1,000 mg total) by mouth every 8 (eight) hours as needed.  Qty: 90 tablet, Refills: 0      budesonide-formoterol 160-4.5 mcg (SYMBICORT) 160-4.5 mcg/actuation HFAA Inhale 2 puffs into the lungs every 12 (twelve) hours.  Qty: 10.2 g, Refills: 12         STOP taking these medications       clopidogrel (PLAVIX) 75 mg tablet Comments:   Reason for Stopping:               Discharge Procedure Orders (must include Diet, Follow-up, Activity)   Discharge Procedure Orders (must include Diet, Follow-up, Activity)   Diet general   Order Comments: Regular diet     Other restrictions (specify):   Order  Comments: 1.  No blowing nose for 1 week.  2.  Cough or sneeze with open mouth for 1 week.  3.  4-5 sprays of saline nasal spray in each nostril followed by sniffing every 2 hr while awake.     Call MD for:  temperature >100.4     Call MD for:  persistent nausea and vomiting     Call MD for:  severe uncontrolled pain     Call MD for:  difficulty breathing, headache or visual disturbances     Call MD for:  redness, tenderness, or signs of infection (pain, swelling, redness, odor or green/yellow discharge around incision site)     Change dressing (specify)   Order Comments: Change mustache dressing as needed.  Discontinue when no longer having bleeding or drainage from the nose.     Lifting restrictions   Order Comments: Do not lift heavier than 10 pounds        Discharge Date: No discharge date for patient encounter.   normal performance

## 2021-10-20 NOTE — PROGRESS NOTE ADULT - NUTRITIONAL ASSESSMENT
This patient has been assessed with a concern for Malnutrition and has been determined to have a diagnosis/diagnoses of Severe protein-calorie malnutrition.    This patient is being managed with:   Diet Regular-  Supplement Feeding Modality:  Oral  Ensure Enlive Cans or Servings Per Day:  1       Frequency:  Daily  Entered: Oct 19 2021  3:36PM    Diet Regular-  Supplement Feeding Modality:  Oral  Ensure Enlive Cans or Servings Per Day:  1       Frequency:  Three Times a day  Ensure Pudding Cans or Servings Per Day:  1       Frequency:  Two Times a day  Entered: Oct 19 2021  3:03PM    The following pending diet order is being considered for treatment of Severe protein-calorie malnutrition:null

## 2021-10-20 NOTE — PROGRESS NOTE ADULT - PROBLEM SELECTOR PLAN 4
Unclear etiology. Pt has normocytic anemia w hgb of 11.4. Previously had hgb of 10.2. No signs of bleeding, hemodynamically stable, unlikely cause of fatigue  - active T+S  -transfuse below 7
Unclear etiology. Pt has normocytic anemia w hgb of 11.4. Previously had hgb of 10.2. No signs of bleeding, hemodynamically stable, unlikely cause of fatigue  - active T+S  -transfuse below 7

## 2021-10-20 NOTE — DISCHARGE NOTE PROVIDER - CARE PROVIDER_API CALL
Cait Dee)  Internal Medicine  [address] Floor  [place], [place] [address]  Phone: [phone]  Fax: ()-  Follow Up Time:    Cait Dee)  Internal Medicine  [address], [place] [address]  Phone: [phone]  Fax: ()-  Follow Up Time:     Zeke Sheridan; PhD)  Medical Oncology  [address] [place] [address]  Phone: [phone]  Fax: [phone]  Scheduled Appointment: 10/25/2021 11:00 AM   Cait Dee)  Internal Medicine  [address], [place] [address]  Phone: [phone]  Fax: ()-  Scheduled Appointment: 11/16/2021 04:30 PM    Zeke Sheridan; PhD)  Medical Oncology  [address] [place] [address]  Phone: [phone]  Fax: [phone]  Scheduled Appointment: 10/25/2021 11:00 AM

## 2021-10-20 NOTE — DISCHARGE NOTE PROVIDER - NSDCFUADDAPPT_GEN_ALL_CORE_FT
Please follow-up with your new PCP in 1-2 weeks. We don't have any offices near where you live, but if you check your insurance's network you will be able to find doctors close to your area in your network.  Please follow-up with your new PCP in 1-2 weeks. We don't have any offices near where you live, but if you check your insurance's network you will be able to find doctors close to your area in your network.     Please follow-up with your oncologist, Dr. Sheridan, associated with [facility] to further manage your care for your breast cancer. Your appointment with him is on 10/25/2021 at 11AM. If you are unable to make this appointment, please call his office at [phone] as it is very important to continue management and evaluation.  Please follow-up with your new PCP in 3 weeks on November 16th at 4.30PM.    Please follow-up with your oncologist, Dr. Sheridan, associated with [facility] to further manage your care for your breast cancer. Your appointment with him is on 10/25/2021 at 11AM. If you are unable to make this appointment, please call his office at [phone] as it is very important to continue management and evaluation.

## 2021-10-20 NOTE — PROGRESS NOTE ADULT - SUBJECTIVE AND OBJECTIVE BOX
SUBJECTIVE   OVERNIGHT/INTERVAL HPI: Patient seen and examined at bedside. Doing fine. NAEO.     Remaining ROS negative     OBJECTIVE  PHYSICAL EXAM:  GEN - Elderly female lying flat, appears comfortable on RA  HEENT - NCAT, EOMI  RESP - CTA BL, no wheeze/stridor/rhonchi/crackles. not on supplemental O2.  CARDIO - NS1S2, RRR. No murmurs/rubs/gallops.  ABD - Soft/Non tender/Non distended  Ext - No CHEVY. no signs of venous/arterial stasis ulcers, warm and well perfused  Neuro - cn 2-12 grossly intact.  no tremor. gait not observed. strength 5/5 in upper extremities, and 3-4/5 in LE  Skin - clean, dry, intact. no rashes or lesions.    Psych- AAOx3. appropriate behaviour. attentive. normal affect.    VITAL SIGNS:  ICU Vital Signs Last 24 Hrs  T(C): 36.6 (20 Oct 2021 10:50), Max: 36.7 (19 Oct 2021 20:49)  T(F): 97.8 (20 Oct 2021 10:50), Max: 98.1 (19 Oct 2021 20:49)  HR: 79 (20 Oct 2021 12:01) (78 - 96)  BP: 101/69 (20 Oct 2021 12:01) (93/56 - 119/75)  BP(mean): 80 (20 Oct 2021 12:01) (68 - 80)  ABP: --  ABP(mean): --  RR: 16 (20 Oct 2021 12:01) (16 - 16)  SpO2: 96% (20 Oct 2021 12:01) (95% - 97%)    MEDICATIONS:  MEDICATIONS  (STANDING):  cefTRIAXone   IVPB 1000 milliGRAM(s) IV Intermittent every 24 hours  heparin  Infusion. 900 Unit(s)/Hr (9 mL/Hr) IV Continuous <Continuous>    MEDICATIONS  (PRN):  acetaminophen   Tablet .. 650 milliGRAM(s) Oral every 6 hours PRN Temp greater or equal to 38C (100.4F), Mild Pain (1 - 3)    ALLERGIES:  No Known Allergies    LABS:             10.2   5.20  )-----------( 210      ( 20 Oct 2021 07:12 )             31.5   10-19    137  |  100  |  16  ----------------------------<  66<L>  3.0<L>   |  20<L>  |  0.53    Ca    8.5      19 Oct 2021 00:39    PT/INR - ( 18 Oct 2021 16:31 )   PT: 13.5 sec;   INR: 1.13          PTT - ( 19 Oct 2021 11:28 )  PTT:97.5 sec                 RADIOLOGY & ADDITIONAL TESTS: Reviewed.  CONSULTANTS NOTES REVIEWED/INFORMED: Yes.

## 2021-10-20 NOTE — PROGRESS NOTE ADULT - PROBLEM SELECTOR PLAN 5
The patient has been examined and the H&P has been reviewed:    I concur with the findings and no changes have occurred since H&P was written.    Anesthesia/Surgery risks, benefits and alternative options discussed and understood by patient/family.          There are no hospital problems to display for this patient.    
Fluids: 500cc NS, will start LR 80cc/hr for 12 hours  Electrolytes: Mg>2, K>4  Nutrition: replete lytes PRN  Prophylaxis: heparin gtt  Activity: AAT, OOBTC  GI: none  C: FC  Dispo: Admit to [facility]
Fluids: s/p LR   Electrolytes: Mg>2, K>4  Nutrition: replete lytes PRN  Prophylaxis: heparin gtt  Activity: AAT, OOBTC  GI: none  C: FC  Dispo: Ad[email or web address]]

## 2021-10-20 NOTE — CONSULT NOTE ADULT - ASSESSMENT
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 Beasley (last seen in the office on 12/05/2019), presents with weakness, found to have acute pulmonary embolism and RLE DVTs, started on heparin and switched to Eliquis (10/20/21) and E. coli acute bacterial cystitis, now on antibiotics. Given 20 lb weight loss over the past few weeks in the setting of thrombosis, Oncology consulted for possible breast cancer recurrence.    #) DIAGNOSIS  - History of invasive ductal carcinoma s/p L mastectomy and XRT (completed 04/10/19) [Oncotype RS 6, low-risk], rule-out recurrence  - Acute pulmonary embolism and RLE DVT, on Eliquis   - Normocytic anemia [Baseline Hb 11.7 as of 12/2019]    #) PLAN  - CT Chest/Abdomen/Pelvis did not show any lymphadenopathy. Previously noted 4 mm subpleural nodule is stable in size. 0.8 cm hypodensity in segment 6 of liver is probable cyst versus biliary hamartoma. Radiographically, there is no evidence of breast cancer recurrence.   - Send CEA, CA 15-3, CA 27.29  - Send anti-cardiolipin Ab IgG/IgM, anti-beta2-glycoprotein IgG/IgM as a consideration for antiphospholipid antibody syndrome.  - Given anemia, send Iron studies, B12/Folate, Reticulocyte count   - Can follow-up with Dr. Zeke Sheridan at [facility] in 2 weeks upon discharge [phone]]. Patient also advised to return to her breast surgeon (Dr Dorsey) for follow-up.     To discuss with Dr. 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 Beasley (last seen in the office on 12/05/2019), presents with weakness, found to have acute pulmonary embolism and RLE DVTs, started on heparin and switched to Eliquis (10/20/21) and E. coli acute bacterial cystitis, now on antibiotics. Given 20 lb weight loss over the past few weeks in the setting of thrombosis, Oncology consulted for possible breast cancer recurrence.    #) DIAGNOSIS  - History of invasive ductal carcinoma s/p L mastectomy and XRT (completed 04/10/19) [Oncotype RS 6, low-risk], rule-out recurrence  - Acute pulmonary embolism and RLE DVT, on Eliquis   - Normocytic anemia [Baseline Hb 11.7 as of 12/2019]    #) PLAN  - CT Chest/Abdomen/Pelvis did not show any lymphadenopathy. Previously noted 4 mm subpleural nodule is stable in size. 0.8 cm hypodensity in segment 6 of liver is probable cyst versus biliary hamartoma. Radiographically, there is no evidence of breast cancer recurrence at this time.   - Send CEA, CA 15-3, CA 27.29  - Send anti-cardiolipin Ab IgG/IgM, anti-beta2-glycoprotein IgG/IgM as a consideration for antiphospholipid antibody syndrome. Given her age, first-occurrence of thrombosis and no FHx of thrombophilia, it is unlikely that she has any inheritable hypercoagulable disorders. This can potentially be reassessed as outpatient.    - Given anemia, send Iron studies, B12/Folate, Reticulocyte count   - Can continue on Eliquis  - Can follow-up with Dr. Zeke Sheridan at [facility] in 2 weeks upon discharge [phone]]. Follow-up additionally with Dr. Michelle Mcgowan at [facility] and Blood at [address] [phone]]    Discussed with Dr. 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 Beasley (last seen in the office on 12/05/2019), presents with weakness, found to have acute pulmonary embolism and RLE DVTs, started on heparin and switched to Eliquis (10/20/21) and E. coli acute bacterial cystitis, now on antibiotics. Given 20 lb weight loss over the past few weeks in the setting of thrombosis, Oncology consulted for possible breast cancer recurrence.    #) DIAGNOSIS  - History of invasive ductal carcinoma s/p L mastectomy and XRT (completed 04/10/19) [Oncotype RS 6, low-risk], non-compliant with Anastrozole (last dose circa early 2020), rule-out recurrence  - Acute pulmonary embolism and RLE DVT, on Eliquis   - Normocytic anemia [Baseline Hb 11.7 as of 12/2019]    #) PLAN  - CT Chest/Abdomen/Pelvis did not show any lymphadenopathy. Previously noted 4 mm subpleural nodule is stable in size. 0.8 cm hypodensity in segment 6 of liver is probable cyst versus biliary hamartoma. Radiographically, there is no evidence of breast cancer recurrence at this time. However, patient reports 20 lb weight loss in the past few weeks which she attributes to poor appetite and only eating yogurt and biscuits. Denies other constitutional symptoms (fever or night sweats).   - Send CEA, CA 15-3, CA 27.29  - Send anti-cardiolipin Ab IgG/IgM, anti-beta2-glycoprotein IgG/IgM as a consideration for antiphospholipid antibody syndrome. Given her age, first-occurrence of thrombosis and no FHx of thrombophilia, it is unlikely that she has any inheritable hypercoagulable disorders. This can potentially be reassessed as outpatient.    - Given anemia, send Iron studies, B12/Folate, Reticulocyte count   - Can continue on Eliquis  - Can follow-up with Dr. Zeke Sheridan at [facility] in 2 weeks upon discharge [phone]]. Follow-up additionally with Dr. Michelle Mcgowan at [facility], [place], operated by [facility] and Blood at [address] [phone]]    Discussed with Dr. Shen

## 2021-10-20 NOTE — DISCHARGE NOTE PROVIDER - DETAILS OF MALNUTRITION DIAGNOSIS/DIAGNOSES
This patient has been assessed with a concern for Malnutrition and was treated during this hospitalization for the following Nutrition diagnosis/diagnoses:     -  10/19/2021: Severe protein-calorie malnutrition

## 2021-10-20 NOTE — DISCHARGE NOTE PROVIDER - HOSPITAL COURSE
#Discharge: do not delete  This is an 85 y/o F w/ PMHx of breast cancer s/p mastectomy in 2018 who received radiation in 2019, who presented to [facility] after complaining of weakness for 2-3 weeks. She presented with fatigue, minor dyspnea upon exertion w/ no dizziness, syncope, falls, CP or palpitations, orthopnea or leg swelling. She also describes a reduced appetite, reduced PO intake, and weight loss of 20 pounds over the past several weeks. She was vaccinated against COVID-19 and has never been dx w/ COVID.    Inpatient treatment course:  While in the ED, CT chest resulted in a submassive PE for which she received heparin infusion and subsequently switched to Eliquis. She     Problem List/Main Diagnoses (system-based):         Patient was discharged to: (home/[facility]/acute rehab/hospice, etc, and with what services – home health PT/RN? Home O2?)  New medications:  Changes to old medications:  Medications that were stopped:   Items to follow up as outpatient:  Physical exam at the time of discharge: #Discharge: do not delete  This is an 87 y/o F w/ PMHx of breast cancer s/p mastectomy in 2018 who received radiation in 2019, who presented to [facility] after complaining of weakness for 2-3 weeks. She presented with fatigue, minor dyspnea upon exertion w/ no dizziness, syncope, falls, CP or palpitations, orthopnea or leg swelling. She also describes a reduced appetite, reduced PO intake, and weight loss of 20 pounds over the past several weeks. She was vaccinated against COVID-19 and has never been dx w/ COVID.    Inpatient treatment course:  While in the ED, CT chest resulted in a submassive PE for which she received heparin infusion and subsequently switched to Eliquis. It was also found that she had a DVT in her R popliteal vein and calf vein. She received 1 g of ceftriaxone due to possible acute UTI (UA cloudy, many WBCs, nitrite+, and small LE while in the ED), but was found to be negative. Labs while in the ED were significant for bicarb 21, AG of 22, , and BNP 1110 for which she received IV fluids - NS bolus and LR maintenance fluids.     Problem List/Main Diagnoses:  1. Pulmonary Embolism  - CT Chest: acute PE right middle lobe and right lower lobe  - Started on heparin drip and transitioned to Eliquis BID 10 mg for 7 days (loading dose); thereafter 5 mg BID     2. Fatigue  - Most likely 2/2 from poor PO intake; lost ~20 lbs  - Encouraged PO intake during admission; received IV fluids     3. Anemia  - Most likely anemia of chronic disease   - No signs of bleeding during admission       Patient was discharged to: (home/[facility]/acute rehab/hospice, etc, and with what services – home health PT/RN? Home O2?)  New medications: Eliquis 10 mg BID for 7 days, then 5 mg BID   Changes to old medications: none  Medications that were stopped: NA  Items to follow up as outpatient: PTT    Physical Exam at the time of discharge:  GEN - Elderly female lying flat, appears resting comfortably on RA  HEENT - NCAT, EOMI, no teeth  RESP - CTA BL, no wheeze/stridor/rhonchi/crackles  CARDIO - NS1S2, RRR, no murmurs/rubs/gallops  ABD - Soft, nontender/nondistended  Ext - No CHEVY. no signs of venous/arterial stasis ulcers, warm and well perfused  Skin - clean, dry, intact. no rashes or lesions  Psych- AAOx3. appropriate behaviour, attentive, normal affect #Discharge: do not delete  This is an 87 y/o F w/ PMHx of breast cancer s/p mastectomy in 2018 who received radiation in 2019, who presented to [facility] after complaining of weakness for 2-3 weeks. She presented with fatigue, minor dyspnea upon exertion w/ no dizziness, syncope, falls, CP or palpitations, orthopnea or leg swelling. She also describes a reduced appetite, reduced PO intake, and weight loss of 20 pounds over the past several weeks. She was vaccinated against COVID-19 and has never been dx w/ COVID.    Inpatient treatment course:  While in the ED, CT chest resulted in a submassive PE for which she received heparin infusion and subsequently switched to Eliquis. It was also found that she had a DVT in her R popliteal vein and calf vein. She received 1 g of ceftriaxone due to possible acute UTI (UA cloudy, many WBCs, nitrite+, and small LE while in the ED), but was found to be negative. Labs while in the ED were significant for bicarb 21, AG of 22, , and BNP 1110 for which she received IV fluids - NS bolus and LR maintenance fluids.     Problem List/Main Diagnoses:  1. Pulmonary Embolism  - CT Chest: acute PE right middle lobe and right lower lobe  - Started on heparin drip and transitioned to Eliquis BID 10 mg for 7 days (loading dose); thereafter 5 mg BID     2. Fatigue  - Most likely 2/2 from poor PO intake; lost ~20 lbs  - Encouraged PO intake during admission; received IV fluids     3. Anemia  - Most likely anemia of chronic disease   - No signs of bleeding during admission       Patient was discharged to: [facility]  New medications: Eliquis 10 mg BID for 7 days, then 5 mg BID   Changes to old medications: none  Medications that were stopped: NA  Items to follow up as outpatient: PTT    Physical Exam at the time of discharge:  GEN - Elderly female lying flat, appears resting comfortably on RA  HEENT - NCAT, EOMI, no teeth  RESP - CTA BL, no wheeze/stridor/rhonchi/crackles  CARDIO - NS1S2, RRR, no murmurs/rubs/gallops  ABD - Soft, nontender/nondistended  Ext - No CHEVY. no signs of venous/arterial stasis ulcers, warm and well perfused  Skin - clean, dry, intact. no rashes or lesions  Psych- AAOx3. appropriate behaviour, attentive, normal affect #Discharge: do not delete  This is an 85 y/o F w/ PMHx of breast cancer s/p mastectomy in 2018 who received radiation in 2019, who presented to [facility] after complaining of weakness for 2-3 weeks. She presented with fatigue, minor dyspnea upon exertion w/ no dizziness, syncope, falls, CP or palpitations, orthopnea or leg swelling. She also described a reduced appetite, reduced PO intake, and weight loss of 20 pounds over the past several weeks. She was vaccinated against COVID-19 and has never been dx w/ COVID.    Inpatient treatment course:  While in the ED, CT chest resulted in a submassive PE for which she received heparin infusion and subsequently switched to Eliquis. It was also found that she had a DVT in her R popliteal vein and calf vein. She received 1 g of ceftriaxone due to possible acute UTI (UA cloudy, many WBCs, nitrite+, and small LE while in the ED), but was found to be negative. Labs while in the ED were significant for bicarb 21, AG of 22, , and BNP 1110 for which she received IV fluids - NS bolus and LR maintenance fluids.     Problem List/Main Diagnoses:  1. Pulmonary Embolism  - CT Chest: acute PE right middle lobe and right lower lobe  - Started on heparin drip and transitioned to Eliquis BID 10 mg for 7 days (loading dose); thereafter 5 mg BID     2. Fatigue  - Most likely 2/2 from poor PO intake; lost ~20 lbs  - Encouraged PO intake during admission; received IV fluids     3. Anemia  - Most likely anemia of chronic disease   - No signs of bleeding during admission       Patient was discharged to: home  New medications: Eliquis 10 mg BID for 5 days, then 5 mg BID for 30 days   Changes to old medications: none  Medications that were stopped: NA  Items to follow up as outpatient: PTT    Physical Exam at the time of discharge:  GEN - Elderly female lying flat, appears resting comfortably on RA  HEENT - NCAT, EOMI, no teeth  RESP - CTA BL, no wheeze/stridor/rhonchi/crackles  CARDIO - NS1S2, RRR, no murmurs/rubs/gallops  ABD - Soft, nontender/nondistended  Ext - No CHEVY. no signs of venous/arterial stasis ulcers, warm and well perfused  Skin - clean, dry, intact. no rashes or lesions  Psych- AAOx3. appropriate behaviour, attentive, normal affect #Discharge: do not delete  This is an 87 y/o F w/ PMHx of breast cancer s/p mastectomy in 2018 who received radiation in 2019, who presented to [facility] after complaining of weakness for 2-3 weeks. She presented with fatigue, minor dyspnea upon exertion w/ no dizziness, syncope, falls, CP or palpitations, orthopnea or leg swelling. She also described a reduced appetite, reduced PO intake, and weight loss of 20 pounds over the past several weeks. She was vaccinated against COVID-19 and has never been dx w/ COVID.    Inpatient treatment course:  While in the ED, CT chest resulted in a submassive PE for which she received heparin infusion and subsequently switched to Eliquis. It was also found that she had a DVT in her R popliteal vein and calf vein. She received 1 g of ceftriaxone due to possible acute UTI (UA cloudy, many WBCs, nitrite+, and small LE while in the ED), but was found to be negative. Labs while in the ED were significant for bicarb 21, AG of 22, , and BNP 1110 for which she received IV fluids - NS bolus and LR maintenance fluids. An ECHO (TTE) was performed during admission which showed normal cardiac function.     Problem List/Main Diagnoses:  1. Pulmonary Embolism  - CT Chest: acute PE right middle lobe and right lower lobe  - Started on heparin drip and transitioned to Eliquis BID 10 mg for 7 days (loading dose); thereafter 5 mg BID     2. Fatigue  - Most likely 2/2 from poor PO intake; lost ~20 lbs  - Encouraged PO intake during admission; received IV fluids     3. Anemia  - Most likely anemia of chronic disease   - No signs of bleeding during admission       Patient was discharged to: home  New medications: Eliquis 10 mg BID for 5 days, then 5 mg BID for 30 days   Changes to old medications: none  Medications that were stopped: NA  Items to follow up as outpatient: PTT    Physical Exam at the time of discharge:  GEN - Elderly female lying flat, appears resting comfortably on RA  HEENT - NCAT, EOMI, no teeth  RESP - CTA BL, no wheeze/stridor/rhonchi/crackles  CARDIO - NS1S2, RRR, no murmurs/rubs/gallops  ABD - Soft, nontender/nondistended  Ext - No CHEVY. no signs of venous/arterial stasis ulcers, warm and well perfused  Skin - clean, dry, intact. no rashes or lesions  Psych- AAOx3. appropriate behaviour, attentive, normal affect #Discharge: do not delete  This is an 87 y/o F w/ PMHx of breast cancer s/p mastectomy in 2018 who received radiation in 2019, who presented to [facility] after complaining of weakness for 2-3 weeks. She presented with fatigue, minor dyspnea upon exertion w/ no dizziness, syncope, falls, CP or palpitations, orthopnea or leg swelling. She also described a reduced appetite, reduced PO intake, and weight loss of 20 pounds over the past several weeks. She was vaccinated against COVID-19 and has never been dx w/ COVID.    Inpatient treatment course:  While in the ED, CT chest resulted in a submassive PE for which she received heparin infusion and subsequently switched to Eliquis. It was also found that she had a DVT in her R popliteal vein and calf vein. She received 1 g of ceftriaxone due to possible acute UTI (UA cloudy, many WBCs, nitrite+, and small LE while in the ED), but was found to be negative. Labs while in the ED were significant for bicarb 21, AG of 22, , and BNP 1110 for which she received IV fluids - NS bolus and LR maintenance fluids. An ECHO (TTE) was performed during admission which showed normal cardiac function.     Problem List/Main Diagnoses:  1. Pulmonary Embolism  - CT Chest: acute PE right middle lobe and right lower lobe  - Started on heparin drip and transitioned to Eliquis BID 10 mg for 7 days (loading dose); thereafter 5 mg BID   - Also encouraged to (and assisted with) follow-up with Breast Oncology, as her risk factor for VTE unclear but at risk for disease recurrence or new malignancy.  She failed to follow-up during COVID.     2. Fatigue  - Most likely 2/2 from poor PO intake; lost ~20 lbs  - Encouraged PO intake during admission; received IV fluids   - Also encouraged to (and assisted with) follow-up with Breast Oncology.     3. Anemia  - Most likely anemia of chronic disease   - No signs of bleeding during admission     Patient was discharged to: home  New medications: Eliquis 10 mg BID for 5 days, then 5 mg BID for 30 days   Changes to old medications: none  Medications that were stopped: NA  Items to follow up as outpatient: PTT    Physical Exam at the time of discharge:  GEN - Elderly female lying flat, appears resting comfortably on RA  HEENT - NCAT, EOMI, no teeth  RESP - CTA BL, no wheeze/stridor/rhonchi/crackles  CARDIO - NS1S2, RRR, no murmurs/rubs/gallops  ABD - Soft, nontender/nondistended  Ext - No CHEVY. no signs of venous/arterial stasis ulcers, warm and well perfused  Skin - clean, dry, intact. no rashes or lesions  Psych- AAOx3. appropriate behaviour, attentive, normal affect

## 2021-10-20 NOTE — OCCUPATIONAL THERAPY INITIAL EVALUATION ADULT - MODALITIES TREATMENT COMMENTS
Pt performed functional mobility in [facility] with CGA using RW. Pt demonstrated increased balance when using RW compared to SC. Pt required sitting rest break during functional mobility.

## 2021-10-20 NOTE — PROGRESS NOTE ADULT - PROBLEM SELECTOR PLAN 2
Pt complains of shakiness and fatigue w exertion of 2-3 weeks associated with poor PO intake. DDx includes: Deconditioning due to poor PO intake, Hypothyroidism, less likely CHF, malignancy.  Poor PO intake leading to dehydration and fatigue would explain her concentrated, urine and thirst. furthermore could lead to a ketoacidosis, and this pt has an elevated AG w normal sugar and no polyuria. Less likely CHF as pt euvolemic on exam although does have elevated BNP in setting of PE. No lesions on CT A/P. Pt describes slower gate but has no tremor, masked facies, or cogwheel rigidity.  PLAN:  - s/p 80cc/hr of LR for 12 hours  - f/u TSH  - TTE, nml

## 2021-10-20 NOTE — PROGRESS NOTE ADULT - PROBLEM SELECTOR PLAN 1
Submassive PE: Patient presented with complaints of progressive fatigue on exertion and mild dyspnea. She has been hemodynamically stable and not hypoxic but did have elevated BNP and evidence of R heart strain on CT. Unclear if PE contributed to her fatigue, or if she has been more sedentary lately due to fatigue which contributed to PE.  LE US for DVT unremarkable. BNP 1602. Anion gap closed.  PLAN:  - Transitioned from heparin gtt to loading dose Eliquis  - continue to monitor for hemodynamic stability and signs of cardiac ischemia.

## 2021-10-20 NOTE — DISCHARGE NOTE PROVIDER - CARE PROVIDERS DIRECT ADDRESSES
,[email or web address]

## 2021-10-21 ENCOUNTER — TRANSCRIPTION ENCOUNTER (OUTPATIENT)
Age: 86
End: 2021-10-21

## 2021-10-21 VITALS
RESPIRATION RATE: 18 BRPM | TEMPERATURE: 98 F | OXYGEN SATURATION: 96 % | DIASTOLIC BLOOD PRESSURE: 55 MMHG | HEART RATE: 91 BPM | SYSTOLIC BLOOD PRESSURE: 104 MMHG

## 2021-10-21 LAB
ANION GAP SERPL CALC-SCNC: 6 MMOL/L — SIGNIFICANT CHANGE UP (ref 5–17)
APTT BLD: 32.8 SEC — SIGNIFICANT CHANGE UP (ref 27.5–35.5)
B2 GLYCOPROT1 AB SER QL: NEGATIVE — SIGNIFICANT CHANGE UP
BCA 255 TISS QL IMSTN: 6.4 U/ML — SIGNIFICANT CHANGE UP
BUN SERPL-MCNC: 10 MG/DL — SIGNIFICANT CHANGE UP (ref 7–23)
CALCIUM SERPL-MCNC: 8.3 MG/DL — LOW (ref 8.4–10.5)
CANCER AG27-29 SERPL-ACNC: 8.6 U/ML — SIGNIFICANT CHANGE UP (ref 0–38.6)
CARDIOLIPIN AB SER-ACNC: NEGATIVE — SIGNIFICANT CHANGE UP
CEA SERPL-MCNC: 5.2 NG/ML — HIGH (ref 0–3.8)
CHLORIDE SERPL-SCNC: 105 MMOL/L — SIGNIFICANT CHANGE UP (ref 96–108)
CO2 SERPL-SCNC: 27 MMOL/L — SIGNIFICANT CHANGE UP (ref 22–31)
CREAT SERPL-MCNC: 0.53 MG/DL — SIGNIFICANT CHANGE UP (ref 0.5–1.3)
FOLATE SERPL-MCNC: 2.4 NG/ML — LOW
GLUCOSE SERPL-MCNC: 97 MG/DL — SIGNIFICANT CHANGE UP (ref 70–99)
HCT VFR BLD CALC: 31.8 % — LOW (ref 34.5–45)
HGB BLD-MCNC: 10.1 G/DL — LOW (ref 11.5–15.5)
INR BLD: 2.07 — HIGH (ref 0.88–1.16)
MCHC RBC-ENTMCNC: 26.5 PG — LOW (ref 27–34)
MCHC RBC-ENTMCNC: 31.8 GM/DL — LOW (ref 32–36)
MCV RBC AUTO: 83.5 FL — SIGNIFICANT CHANGE UP (ref 80–100)
NRBC # BLD: 0 /100 WBCS — SIGNIFICANT CHANGE UP (ref 0–0)
PLATELET # BLD AUTO: 213 K/UL — SIGNIFICANT CHANGE UP (ref 150–400)
POTASSIUM SERPL-MCNC: 3 MMOL/L — LOW (ref 3.5–5.3)
POTASSIUM SERPL-SCNC: 3 MMOL/L — LOW (ref 3.5–5.3)
PROTHROM AB SERPL-ACNC: 24 SEC — HIGH (ref 10.6–13.6)
RBC # BLD: 3.81 M/UL — SIGNIFICANT CHANGE UP (ref 3.8–5.2)
RBC # FLD: 15.1 % — HIGH (ref 10.3–14.5)
SODIUM SERPL-SCNC: 138 MMOL/L — SIGNIFICANT CHANGE UP (ref 135–145)
VIT B12 SERPL-MCNC: 522 PG/ML — SIGNIFICANT CHANGE UP (ref 232–1245)
WBC # BLD: 5.56 K/UL — SIGNIFICANT CHANGE UP (ref 3.8–10.5)
WBC # FLD AUTO: 5.56 K/UL — SIGNIFICANT CHANGE UP (ref 3.8–10.5)

## 2021-10-21 PROCEDURE — 99239 HOSP IP/OBS DSCHRG MGMT >30: CPT

## 2021-10-21 RX ORDER — FOLIC ACID 0.8 MG
1 TABLET ORAL
Qty: 0 | Refills: 0 | DISCHARGE
Start: 2021-10-21 | End: 2021-10-28

## 2021-10-21 RX ORDER — APIXABAN 2.5 MG/1
1 TABLET, FILM COATED ORAL
Qty: 74 | Refills: 0
Start: 2021-10-21

## 2021-10-21 RX ORDER — FOLIC ACID 0.8 MG
1 TABLET ORAL ONCE
Refills: 0 | Status: DISCONTINUED | OUTPATIENT
Start: 2021-10-21 | End: 2021-10-21

## 2021-10-21 RX ORDER — POTASSIUM CHLORIDE 20 MEQ
40 PACKET (EA) ORAL EVERY 4 HOURS
Refills: 0 | Status: COMPLETED | OUTPATIENT
Start: 2021-10-21 | End: 2021-10-21

## 2021-10-21 RX ORDER — FOLIC ACID 0.8 MG
1 TABLET ORAL
Qty: 30 | Refills: 0
Start: 2021-10-21 | End: 2021-10-21

## 2021-10-21 RX ORDER — APIXABAN 2.5 MG/1
2 TABLET, FILM COATED ORAL
Qty: 10 | Refills: 0
Start: 2021-10-21 | End: 2021-10-25

## 2021-10-21 RX ORDER — APIXABAN 2.5 MG/1
2 TABLET, FILM COATED ORAL
Qty: 20 | Refills: 0
Start: 2021-10-21 | End: 2021-10-25

## 2021-10-21 RX ADMIN — APIXABAN 10 MILLIGRAM(S): 2.5 TABLET, FILM COATED ORAL at 05:10

## 2021-10-21 RX ADMIN — Medication 40 MILLIEQUIVALENT(S): at 13:06

## 2021-10-21 RX ADMIN — Medication 40 MILLIEQUIVALENT(S): at 11:28

## 2021-10-21 NOTE — PROGRESS NOTE ADULT - SUBJECTIVE AND OBJECTIVE BOX
Physical Medicine and Rehabilitation Progress Note:    Patient is a 86y old  Female who presents with a chief complaint of pulmonary embolism (21 Oct 2021 13:39)      HPI:  HPI: 85 y/o F w PMH of breast cancer s/p mastectomy in 2018, radiation in 2019 who complains of weakness of 2-3 weeks. She further characterizes this as shakiness and fatigue whenever she gets up to walk. It got to the point where she would tire with 2-3 steps. She feels her gait is slower than usual. She has some dyspnea on exertion but no dizziness, syncope, falls, CP or palpitations. No orthopnea or leg swelling. She also describes a reduced appetite, reduced PO intake, and weight loss of 20 pounds over the past several weeks. For about one week she has also complained of thirst, requiring her to get up at night to drink water w reduced urination, no dysuria, fevers, or chills. She is vaccinated and has not gotten covid.    In the ED:  Initial vital signs: T: 98 F, HR: 82, BP: 132/83, R: 16, SpO2: 97% on RA  ED course:   Labs: significant for Bicarb 21, AG of 22, , BNP 1110,   Trop 0.01, lactate 1.0  UA: Cloudy, Many WBC, Nitrite+, small LE  Imaging:  CT head: negative for bleed or CVA  CT chest, abd, pelvis: PE in R middle and lower lobe, bladder wall thickening suggesting UTI  CXR: clear lungs  EKG: NSR w PVCs  Medications: Ceftriaxone 1g, heparin 500U IV push, heparin gtt, 500cc NS  Consults: none      (18 Oct 2021 18:11)                            10.1   5.56  )-----------( 213      ( 21 Oct 2021 07:17 )             31.8       10-21    138  |  105  |  10  ----------------------------<  97  3.0<L>   |  27  |  0.53    Ca    8.3<L>      21 Oct 2021 07:17      Vital Signs Last 24 Hrs  T(C): 36.8 (21 Oct 2021 13:16), Max: 36.8 (21 Oct 2021 13:16)  T(F): 98.2 (21 Oct 2021 13:16), Max: 98.2 (21 Oct 2021 13:16)  HR: 91 (21 Oct 2021 13:16) (57 - 91)  BP: 104/55 (21 Oct 2021 13:16) (103/64 - 117/76)  BP(mean): 77 (20 Oct 2021 16:45) (77 - 77)  RR: 18 (21 Oct 2021 13:16) (16 - 18)  SpO2: 96% (21 Oct 2021 13:16) (95% - 97%)    MEDICATIONS  (STANDING):  apixaban 10 milliGRAM(s) Oral every 12 hours  folic acid 1 milliGRAM(s) Oral once    MEDICATIONS  (PRN):  acetaminophen   Tablet .. 650 milliGRAM(s) Oral every 6 hours PRN Temp greater or equal to 38C (100.4F), Mild Pain (1 - 3)    Currently Undergoing Physical/ Occupational Therapy at bedside.    Functional Status Assessment:         Therapeutic Interventions      Bed Mobility  Bed Mobility Training Scooting: supervision;  supervision;  verbal cues  Bed Mobility Training Bridging: supervision;  supervision;  verbal cues  Bed Mobility Training Sit-to-Supine: contact guard;  1 person assist;  verbal cues  Bed Mobility Training Supine-to-Sit: supervision;  supervision;  verbal cues  Bed Mobility Training Limitations: decreased ability to use arms for pushing/pulling;  decreased ability to use legs for bridging/pushing;  impaired ability to control trunk for mobility;  impaired balance;  decreased strength;  impaired postural control    Sit-Stand Transfer Training  Transfer Training Sit-to-Stand Transfer: contact guard;  1 person assist;  verbal cues;  full weight-bearing   rolling walker  Transfer Training Stand-to-Sit Transfer: contact guard;  1 person assist;  rolling walker;  verbal cues  Sit-to-Stand Transfer Training Transfer Safety Analysis: decreased balance;  decreased step length;  decreased weight-shifting ability;  decreased strength;  impaired balance;  impaired postural control;  rolling walker    Lower Body Dressing Training  Lower Body Dressing Training Symptoms Noted During/After Treatment: fatigue  Lower Body Dressing Training Assistance: contact guard;  1 person assist;  verbal cues;  decreased strength;  impaired balance;  impaired postural control;  Don socks sitting EOB; CGA to maintain balance.        PM&R Impression: as above    Current Disposition Plan Recommendations:    subacute rehab placement, patient is declining

## 2021-10-21 NOTE — DISCHARGE NOTE NURSING/CASE MANAGEMENT/SOCIAL WORK - PATIENT PORTAL LINK FT
You can access the FollowMyHealth Patient Portal offered by [facility] by registering at the following website: [email or web address]. By joining [employer]’s FollowMyHealth portal, you will also be able to view your health information using other applications (apps) compatible with our system.

## 2021-10-21 NOTE — DISCHARGE NOTE NURSING/CASE MANAGEMENT/SOCIAL WORK - NSDCPEELIQUIS_GEN_ALL_CORE
Apixaban/Eliquis - Compliance/Apixaban/Eliquis - Dietary Advice/Apixaban/Eliquis - Follow up monitoring/Apixaban/Eliquis - Potential for adverse drug reactions and interactions Patient requests all Lab, Cardiology, and Radiology Results on their Discharge Instructions

## 2021-10-21 NOTE — PROGRESS NOTE ADULT - ASSESSMENT
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 Beasley (last seen in the office on 12/05/2019), presents with weakness, found to have acute pulmonary embolism and RLE DVTs, started on heparin and switched to Eliquis (10/20/21) and E. coli acute bacterial cystitis, now on antibiotics. Given 20 lb weight loss over the past few weeks in the setting of thrombosis, Oncology consulted for possible breast cancer recurrence.    #) DIAGNOSIS  - History of invasive ductal carcinoma s/p L mastectomy and XRT (completed 04/10/19) [Oncotype RS 6, low-risk], non-compliant with Anastrozole (last dose circa early 2020), rule-out recurrence  - Acute pulmonary embolism and RLE DVT, on Eliquis   - Normocytic anemia [Baseline Hb 11.7 as of 12/2019]    #) PLAN  - CT Chest/Abdomen/Pelvis did not show any lymphadenopathy. Previously noted 4 mm subpleural nodule is stable in size. 0.8 cm hypodensity in segment 6 of liver is probable cyst versus biliary hamartoma. Radiographically, there is no evidence of breast cancer recurrence at this time. However, patient reports 20 lb weight loss in the past few weeks which she attributes to poor appetite and only eating yogurt and biscuits. Denies other constitutional symptoms (fever or night sweats).   - CEA mildly elevated. CA 15-3, CA 27.29 WNL.  - Followup anti-cardiolipin Ab IgG/IgM, anti-beta2-glycoprotein IgG/IgM as a consideration for antiphospholipid antibody syndrome. Given her age, first-occurrence of thrombosis and no FHx of thrombophilia, it is unlikely that she has any inheritable hypercoagulable disorders. This can potentially be reassessed as outpatient.    - Iron studies consistent with anemia of chronic disease. B12 WNL. Folate acid deficiency noted, start on Folic acid 1 mg QD.  - Can continue on Eliquis  - Can follow-up with Dr. Zeke Sheridan (Breast Oncologist) at [facility] in 2 weeks upon discharge [phone] Follow-up additionally with Dr. Olivera at [facility] (Hematologist)    Discussed with Dr. Shen

## 2021-10-21 NOTE — PROGRESS NOTE ADULT - ASSESSMENT
per Internal Medicine    85 y/o F w PMH of breast cancer s/p mastectomy in 2018, radiation in 2019 who complains of progressive weakness and fatigue on exertion of 2-3 weeks with reduced PO intake and weight loss and found to have PE and positive UA. Admitted for further workup and management.     Problem/Plan - 1:  ·  Problem: Pulmonary embolism.   ·  Plan: Submassive PE: Patient presented with complaints of progressive fatigue on exertion and mild dyspnea. She has been hemodynamically stable and not hypoxic but did have elevated BNP and evidence of R heart strain on CT. Unclear if PE contributed to her fatigue, or if she has been more sedentary lately due to fatigue which contributed to PE.  LE US for DVT unremarkable. BNP 1602. Anion gap closed.  PLAN:  - Transitioned from heparin gtt to loading dose Eliquis  - continue to monitor for hemodynamic stability and signs of cardiac ischemia.    Problem/Plan - 2:  ·  Problem: Fatigue.   ·  Plan: Pt complains of shakiness and fatigue w exertion of 2-3 weeks associated with poor PO intake. DDx includes: Deconditioning due to poor PO intake, Hypothyroidism, less likely CHF, malignancy.  Poor PO intake leading to dehydration and fatigue would explain her concentrated, urine and thirst. furthermore could lead to a ketoacidosis, and this pt has an elevated AG w normal sugar and no polyuria. Less likely CHF as pt euvolemic on exam although does have elevated BNP in setting of PE. No lesions on CT A/P. Pt describes slower gate but has no tremor, masked facies, or cogwheel rigidity.  PLAN:  - s/p 80cc/hr of LR for 12 hours  - f/u TSH  - TTE, nml.    Problem/Plan - 3:  ·  Problem: High anion gap metabolic acidosis.   ·  Plan: Likely 2/2 starvation ketoacidosis in setting of reduced PO intake. Hydrated. Resolved.    Problem/Plan - 4:  ·  Problem: Anemia.   ·  Plan: Unclear etiology. Pt has normocytic anemia w hgb of 11.4. Previously had hgb of 10.2. No signs of bleeding, hemodynamically stable, unlikely cause of fatigue  - active T+S  -transfuse below 7.    Problem/Plan - 5:  ·  Problem: Nutrition, metabolism, and development symptoms.   ·  Plan: Fluids: s/p LR   Electrolytes: Mg>2, K>4  Nutrition: replete lytes PRN  Prophylaxis: heparin gtt  Activity: AAT, OOBTC  GI: none  C: FC  Dispo: Admit to [facility].

## 2021-10-21 NOTE — PROGRESS NOTE ADULT - SUBJECTIVE AND OBJECTIVE BOX
LENGTH OF HOSPITAL STAY: 3d    SUBJECTIVE: No acute overnight events    HISTORY OF PRESENTING ILLNESS:   HPI: 87 y/o F w PMH of breast cancer s/p mastectomy in 2018, radiation in 2019 who complains of weakness of 2-3 weeks. She further characterizes this as shakiness and fatigue whenever she gets up to walk. It got to the point where she would tire with 2-3 steps. She feels her gait is slower than usual. She has some dyspnea on exertion but no dizziness, syncope, falls, CP or palpitations. No orthopnea or leg swelling. She also describes a reduced appetite, reduced PO intake, and weight loss of 20 pounds over the past several weeks. For about one week she has also complained of thirst, requiring her to get up at night to drink water w reduced urination, no dysuria, fevers, or chills. She is vaccinated and has not gotten covid.    PAST MEDICAL & SURGICAL HISTORY:  Left breast mass  Breast cancer  Left History of tonsillectomy  S/P left mastectomy    ALLERGIES:  No Known Allergies    MEDICATIONS:  STANDING MEDICATIONS  apixaban 10 milliGRAM(s) Oral every 12 hours  folic acid 1 milliGRAM(s) Oral once  potassium chloride    Tablet ER 40 milliEquivalent(s) Oral every 4 hours    PRN MEDICATIONS  acetaminophen   Tablet .. 650 milliGRAM(s) Oral every 6 hours PRN    VITALS:   T(F): 98.2  HR: 91  BP: 104/55  RR: 18  SpO2: 96%    LABS:                        10.1   5.56  )-----------( 213      ( 21 Oct 2021 07:17 )             31.8     10-21    138  |  105  |  10  ----------------------------<  97  3.0<L>   |  27  |  0.53    Ca    8.3<L>      21 Oct 2021 07:17    PT/INR - ( 21 Oct 2021 07:17 )   PT: 24.0 sec;   INR: 2.07       PTT - ( 21 Oct 2021 07:17 )  PTT:32.8 sec    Culture - Urine (collected 18 Oct 2021 17:35)  Source: Clean Catch Clean Catch (Midstream)  Final Report (20 Oct 2021 13:44):    >100,000 CFU/ml Escherichia coli  Organism: Escherichia coli (20 Oct 2021 13:44)  Organism: Escherichia coli (20 Oct 2021 13:44)    RADIOLOGY:  Reviewed    PHYSICAL EXAM:  GEN: No acute distress  HEENT: NCAT  LUNGS: Clear to auscultation bilaterally   HEART: S1/S2 present. RRR.   ABD: Soft, non-tender, non-distended. Bowel sounds present  EXT: No pitting edema  NEURO: AAOX3

## 2021-10-21 NOTE — DISCHARGE NOTE NURSING/CASE MANAGEMENT/SOCIAL WORK - NSDCFUADDAPPT_GEN_ALL_CORE_FT
Please follow-up with your new PCP in 3 weeks on November 16th at 4.30PM.    Please follow-up with your oncologist, Dr. Sheridan, associated with [facility] to further manage your care for your breast cancer. Your appointment with him is on 10/25/2021 at 11AM. If you are unable to make this appointment, please call his office at [phone] as it is very important to continue management and evaluation.

## 2021-10-22 PROBLEM — Z86.718 HISTORY OF DEEP VENOUS THROMBOSIS: Status: RESOLVED | Noted: 2021-10-22 | Resolved: 2021-10-22

## 2021-10-22 PROBLEM — Z86.711 HISTORY OF PULMONARY EMBOLISM: Status: RESOLVED | Noted: 2021-10-22 | Resolved: 2021-10-22

## 2021-10-25 ENCOUNTER — APPOINTMENT (OUTPATIENT)
Dept: HEMATOLOGY ONCOLOGY | Facility: CLINIC | Age: 86
End: 2021-10-25
Payer: MEDICARE

## 2021-10-25 VITALS
HEIGHT: 66 IN | BODY MASS INDEX: 22.02 KG/M2 | OXYGEN SATURATION: 99 % | SYSTOLIC BLOOD PRESSURE: 114 MMHG | TEMPERATURE: 98.2 F | WEIGHT: 137 LBS | DIASTOLIC BLOOD PRESSURE: 70 MMHG | RESPIRATION RATE: 18 BRPM | HEART RATE: 106 BPM

## 2021-10-25 DIAGNOSIS — Z85.3 PERSONAL HISTORY OF MALIGNANT NEOPLASM OF BREAST: ICD-10-CM

## 2021-10-25 DIAGNOSIS — Z86.711 PERSONAL HISTORY OF PULMONARY EMBOLISM: ICD-10-CM

## 2021-10-25 DIAGNOSIS — Z86.718 PERSONAL HISTORY OF OTHER VENOUS THROMBOSIS AND EMBOLISM: ICD-10-CM

## 2021-10-25 PROCEDURE — 99204 OFFICE O/P NEW MOD 45 MIN: CPT

## 2021-10-25 NOTE — REVIEW OF SYSTEMS
[Fatigue] : fatigue [Recent Change In Weight] : ~T recent weight change [Negative] : Gastrointestinal [FreeTextEntry8] : E. coli UTI [de-identified] : recent PE and DVT on Eliquis

## 2021-10-25 NOTE — PHYSICAL EXAM
[Normal] : affect appropriate [de-identified] : s/p left mastectomy, with no evidence of recurrent disease. Right breast without mass, dimpling or erythema.

## 2021-10-25 NOTE — HISTORY OF PRESENT ILLNESS
[Disease: _____________________] : Disease: [unfilled] [T: ___] : T[unfilled] [N: ___] : N[unfilled] [M: ___] : M[unfilled] [AJCC Stage: ____] : AJCC Stage: [unfilled] [de-identified] : 86F with a history of [id number] ER+ SC+ HER2- left invasive ductal carcinoma s/p LEFT mastectomy and ALND on 11/19/18 (Rebekah Bear), 50Gy/25Fr to chest wall/reg node from 3/7/19 - 4/10/19 (Alberto Neal) and anastrozole 8/2019 - early 2020 (Yael Beasley).  She was admitted to [facility] 10/18/21-10/21/21 for weakness, unintentional weight loss.  She was diagnosed with PE, DVT, E.coli cystitis and was placed on Eliquis.  It was noted that she did not follow up with her providers due to the COVID pandemic and she was referred by [facility] for continuity of care.  \par \par OncHx:\par She experienced a fall on 8/22/18 and subsequently palpated a left breast mass a week later and followed up in [facility] ED. She notes she did not have a PCP at the time as she had been generally healthy. Denies any skin changes or nipple discharge at the time. \par \par On 9/20/18, she underwent LEFT breast U/S, which revealed the following:\par At the 1:00 position, 4cmFN a 1.8 x 1.6 x 1.6 cm hypo/isoechoic mass with ill defined borders demonstrating internal vascularity.\par \par She saw Dr. Bear for initial eval on 9/26/18, was noted to have skin dimpling.\par \par On 10/2/18, she had a dx mammo showing a highly suspicious mass in the upper outer left breast corresponding to recently diagnosed 3 cm mass on ultrasound. Same day US guided biopsy of the mass showed invasive duct carcinoma, well to moderately differentiated with calcifications, largest focus in the core measuring 1.3 cm, ER/ SC positive, HER-2 equivocal, FISH negative. Radiology was subsequently asked to comment on the RIGHT breast, and it showed the following:\par Multiple scattered and grouped calcifications present in the right breast. Some of the calcifications have a linear distribution pattern. \par \par On 11/5/18, she had stereotactic core biopsy (right mid lower breast) with pathology revealing the following:\par - Breast tissue with duct ectasia, cysts and usual ductal hyperplasia\par - Calcifications associated with benign epithelium and stroma\par \par She underwent LEFT mastectomy and ALND on 11/19/18. Final pathology revealed the following: \par 1. Left sentinel lymph node, excision: - Metastatic carcinoma involving 1 lymph node with extranodal extension spanning 5 mm (1/1). \par 2. Left breast, mastectomy:\par - Invasive duct carcinoma, well-differentiated with prominent central sclerosis. 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 DCIS is noted only adjacent to invasive tumor.\par -All margins negative for invasive and in-situ duct carcinoma with greater than 1 cm clearance.\par - Biopsy site changes.\par 3. Medial margin, excision: - Breast tissue negative for carcinoma.\par 4. Left axillary lymph node dissection:\par - 17 lymph nodes negative for tumor.\par - Focus of invasive duct carcinoma present within adjacent adipose tissue. \par \par Note: The largest contiguous lymph node metastasis in part 1 measures 3 mm in greatest dimension. A focus of extranodal\par extension spanning 5mm is noted in part 1. In part 4, A focus of carcinoma spanning 11 mm is noted within fat. The tumor in parts 1 and 4 are morphologically similar to the tumor in part 2.\par \par 50 Gy/25Fr to chest wall/reg nodes 3/7/19 to 4/10/19 by Alberto Neal.\par \par She saw medical oncologist Yael Beasley who prescribed anastrozole 8/2019 - early 2020.  Lost to follow up during COVID pandemic.\par \par 10/25/2019 DEXA: osteoporosis (Left hip T-score -2.7, femur - 2.5, spine -1.2)\par \par 12/10/19 Dx mammo/US R breast:\par 1. New microclip in mid inferior right breast with scattered and grouped, predominantly round probably benign-appearing microcalcifications, mostly superior and lateral to is, some of which are in a linear configuration, significantly decreased from prior study secondary to interval biopsy.\par 2. Negative breast ultrasound.\par \par She was admitted to [facility] for pulmonary emboli on 10/18/2021\par \par 10/18/21 C/A/P: Acute PE RML and RLL, s/p left mastectomy, chronic radiation pneumonitis JARED, suspected cystitis.\par \par 10/18/21 US LE doppler: right popliteal vein DVT, right calf vein DVT\par \par 10/18/21 CT brain:no acute intracranial hemorrhage, mass effect, or demarcated territorial infarction.\par \par 10/18/21 ECHO: normal cardiac function.\par \par Family history is negative for breast or ovarian cancer. \par   [de-identified] : invasive ductal carcinoma [de-identified] : ER+, MT+, HER2-, Oncotype Dx RS 6

## 2021-10-27 RX ORDER — APIXABAN 2.5 MG/1
1 TABLET, FILM COATED ORAL
Qty: 30 | Refills: 0
Start: 2021-10-27 | End: 2021-11-25

## 2021-10-27 RX ORDER — APIXABAN 2.5 MG/1
1 TABLET, FILM COATED ORAL
Qty: 50 | Refills: 0
Start: 2021-10-27 | End: 2021-11-20

## 2021-10-29 DIAGNOSIS — I82.431 ACUTE EMBOLISM AND THROMBOSIS OF RIGHT POPLITEAL VEIN: ICD-10-CM

## 2021-10-29 DIAGNOSIS — I26.99 OTHER PULMONARY EMBOLISM WITHOUT ACUTE COR PULMONALE: ICD-10-CM

## 2021-10-29 DIAGNOSIS — I82.4Z1 ACUTE EMBOLISM AND THROMBOSIS OF UNSPECIFIED DEEP VEINS OF RIGHT DISTAL LOWER EXTREMITY: ICD-10-CM

## 2021-10-29 DIAGNOSIS — N30.00 ACUTE CYSTITIS WITHOUT HEMATURIA: ICD-10-CM

## 2021-10-29 DIAGNOSIS — E88.89 OTHER SPECIFIED METABOLIC DISORDERS: ICD-10-CM

## 2021-10-29 DIAGNOSIS — E87.2 ACIDOSIS: ICD-10-CM

## 2021-10-29 DIAGNOSIS — M81.0 AGE-RELATED OSTEOPOROSIS WITHOUT CURRENT PATHOLOGICAL FRACTURE: ICD-10-CM

## 2021-10-29 DIAGNOSIS — Z92.3 PERSONAL HISTORY OF IRRADIATION: ICD-10-CM

## 2021-10-29 DIAGNOSIS — Z85.3 PERSONAL HISTORY OF MALIGNANT NEOPLASM OF BREAST: ICD-10-CM

## 2021-10-29 DIAGNOSIS — D63.8 ANEMIA IN OTHER CHRONIC DISEASES CLASSIFIED ELSEWHERE: ICD-10-CM

## 2021-10-29 DIAGNOSIS — K76.89 OTHER SPECIFIED DISEASES OF LIVER: ICD-10-CM

## 2021-10-29 DIAGNOSIS — E43 UNSPECIFIED SEVERE PROTEIN-CALORIE MALNUTRITION: ICD-10-CM

## 2021-11-01 ENCOUNTER — APPOINTMENT (OUTPATIENT)
Dept: INTERNAL MEDICINE | Facility: CLINIC | Age: 86
End: 2021-11-01

## 2021-11-06 ENCOUNTER — INPATIENT (INPATIENT)
Facility: HOSPITAL | Age: 86
LOS: 3 days | Discharge: NURSING, REHAB OR LONG-TERM CARE | DRG: 871 | End: 2021-11-10
Attending: HOSPITALIST | Admitting: HOSPITALIST
Payer: MEDICARE

## 2021-11-06 VITALS
HEIGHT: 66 IN | SYSTOLIC BLOOD PRESSURE: 145 MMHG | WEIGHT: 136.03 LBS | HEART RATE: 106 BPM | RESPIRATION RATE: 16 BRPM | OXYGEN SATURATION: 95 % | TEMPERATURE: 98 F | DIASTOLIC BLOOD PRESSURE: 96 MMHG

## 2021-11-06 DIAGNOSIS — D72.825 BANDEMIA: ICD-10-CM

## 2021-11-06 DIAGNOSIS — E87.2 ACIDOSIS: ICD-10-CM

## 2021-11-06 DIAGNOSIS — E80.6 OTHER DISORDERS OF BILIRUBIN METABOLISM: ICD-10-CM

## 2021-11-06 DIAGNOSIS — S72.001A FRACTURE OF UNSPECIFIED PART OF NECK OF RIGHT FEMUR, INITIAL ENCOUNTER FOR CLOSED FRACTURE: ICD-10-CM

## 2021-11-06 DIAGNOSIS — I26.99 OTHER PULMONARY EMBOLISM WITHOUT ACUTE COR PULMONALE: ICD-10-CM
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
## 2021-11-06 DIAGNOSIS — Z86.711 PERSONAL HISTORY OF PULMONARY EMBOLISM: ICD-10-CM

## 2021-11-06 DIAGNOSIS — R63.8 OTHER SYMPTOMS AND SIGNS CONCERNING FOOD AND FLUID INTAKE: ICD-10-CM

## 2021-11-06 DIAGNOSIS — Z90.12 ACQUIRED ABSENCE OF LEFT BREAST AND NIPPLE: Chronic | ICD-10-CM

## 2021-11-06 DIAGNOSIS — C50.919 MALIGNANT NEOPLASM OF UNSPECIFIED SITE OF UNSPECIFIED FEMALE BREAST: ICD-10-CM

## 2021-11-06 DIAGNOSIS — M62.82 RHABDOMYOLYSIS: ICD-10-CM

## 2021-11-06 DIAGNOSIS — Z90.89 ACQUIRED ABSENCE OF OTHER ORGANS: Chronic | ICD-10-CM

## 2021-11-06 LAB
ACANTHOCYTES BLD QL SMEAR: SLIGHT — SIGNIFICANT CHANGE UP
ALBUMIN SERPL ELPH-MCNC: 2.8 G/DL — LOW (ref 3.3–5)
ALBUMIN SERPL ELPH-MCNC: 3.6 G/DL — SIGNIFICANT CHANGE UP (ref 3.3–5)
ALP SERPL-CCNC: 105 U/L — SIGNIFICANT CHANGE UP (ref 40–120)
ALP SERPL-CCNC: 128 U/L — HIGH (ref 40–120)
ALT FLD-CCNC: 31 U/L — SIGNIFICANT CHANGE UP (ref 10–45)
ALT FLD-CCNC: 35 U/L — SIGNIFICANT CHANGE UP (ref 10–45)
ANION GAP SERPL CALC-SCNC: 19 MMOL/L — HIGH (ref 5–17)
ANION GAP SERPL CALC-SCNC: 20 MMOL/L — HIGH (ref 5–17)
ANISOCYTOSIS BLD QL: SLIGHT — SIGNIFICANT CHANGE UP
AST SERPL-CCNC: 68 U/L — HIGH (ref 10–40)
AST SERPL-CCNC: 70 U/L — HIGH (ref 10–40)
B-OH-BUTYR SERPL-SCNC: 4.9 MMOL/L — HIGH
BASOPHILS # BLD AUTO: 0 K/UL — SIGNIFICANT CHANGE UP (ref 0–0.2)
BASOPHILS NFR BLD AUTO: 0 % — SIGNIFICANT CHANGE UP (ref 0–2)
BILIRUB SERPL-MCNC: 1.4 MG/DL — HIGH (ref 0.2–1.2)
BILIRUB SERPL-MCNC: 1.8 MG/DL — HIGH (ref 0.2–1.2)
BLD GP AB SCN SERPL QL: NEGATIVE — SIGNIFICANT CHANGE UP
BUN SERPL-MCNC: 24 MG/DL — HIGH (ref 7–23)
BUN SERPL-MCNC: 26 MG/DL — HIGH (ref 7–23)
BURR CELLS BLD QL SMEAR: PRESENT — SIGNIFICANT CHANGE UP
CALCIUM SERPL-MCNC: 8.5 MG/DL — SIGNIFICANT CHANGE UP (ref 8.4–10.5)
CALCIUM SERPL-MCNC: 9.5 MG/DL — SIGNIFICANT CHANGE UP (ref 8.4–10.5)
CHLORIDE SERPL-SCNC: 108 MMOL/L — SIGNIFICANT CHANGE UP (ref 96–108)
CHLORIDE SERPL-SCNC: 111 MMOL/L — HIGH (ref 96–108)
CK MB CFR SERPL CALC: 55.7 NG/ML — HIGH (ref 0–6.7)
CK MB CFR SERPL CALC: 60.9 NG/ML — HIGH (ref 0–6.7)
CK SERPL-CCNC: 2262 U/L — HIGH (ref 25–170)
CK SERPL-CCNC: 2518 U/L — HIGH (ref 25–170)
CK SERPL-CCNC: 2565 U/L — HIGH (ref 25–170)
CO2 SERPL-SCNC: 14 MMOL/L — LOW (ref 22–31)
CO2 SERPL-SCNC: 18 MMOL/L — LOW (ref 22–31)
CREAT SERPL-MCNC: 0.83 MG/DL — SIGNIFICANT CHANGE UP (ref 0.5–1.3)
CREAT SERPL-MCNC: 0.99 MG/DL — SIGNIFICANT CHANGE UP (ref 0.5–1.3)
EOSINOPHIL # BLD AUTO: 0 K/UL — SIGNIFICANT CHANGE UP (ref 0–0.5)
EOSINOPHIL NFR BLD AUTO: 0 % — SIGNIFICANT CHANGE UP (ref 0–6)
GIANT PLATELETS BLD QL SMEAR: PRESENT — SIGNIFICANT CHANGE UP
GLUCOSE SERPL-MCNC: 138 MG/DL — HIGH (ref 70–99)
GLUCOSE SERPL-MCNC: 99 MG/DL — SIGNIFICANT CHANGE UP (ref 70–99)
HCT VFR BLD CALC: 37 % — SIGNIFICANT CHANGE UP (ref 34.5–45)
HGB BLD-MCNC: 12.4 G/DL — SIGNIFICANT CHANGE UP (ref 11.5–15.5)
LACTATE SERPL-SCNC: 2.3 MMOL/L — HIGH (ref 0.5–2)
LACTATE SERPL-SCNC: 3 MMOL/L — HIGH (ref 0.5–2)
LYMPHOCYTES # BLD AUTO: 0.36 K/UL — LOW (ref 1–3.3)
LYMPHOCYTES # BLD AUTO: 4.4 % — LOW (ref 13–44)
MACROCYTES BLD QL: SLIGHT — SIGNIFICANT CHANGE UP
MAGNESIUM SERPL-MCNC: 1.8 MG/DL — SIGNIFICANT CHANGE UP (ref 1.6–2.6)
MANUAL SMEAR VERIFICATION: SIGNIFICANT CHANGE UP
MCHC RBC-ENTMCNC: 28.4 PG — SIGNIFICANT CHANGE UP (ref 27–34)
MCHC RBC-ENTMCNC: 33.5 GM/DL — SIGNIFICANT CHANGE UP (ref 32–36)
MCV RBC AUTO: 84.9 FL — SIGNIFICANT CHANGE UP (ref 80–100)
METAMYELOCYTES # FLD: 6.1 % — HIGH (ref 0–0)
MONOCYTES # BLD AUTO: 0.07 K/UL — SIGNIFICANT CHANGE UP (ref 0–0.9)
MONOCYTES NFR BLD AUTO: 0.9 % — LOW (ref 2–14)
NEUTROPHILS # BLD AUTO: 7.27 K/UL — SIGNIFICANT CHANGE UP (ref 1.8–7.4)
NEUTROPHILS NFR BLD AUTO: 52.6 % — SIGNIFICANT CHANGE UP (ref 43–77)
NEUTS BAND # BLD: 36 % — HIGH (ref 0–8)
NT-PROBNP SERPL-SCNC: HIGH PG/ML (ref 0–300)
OVALOCYTES BLD QL SMEAR: SIGNIFICANT CHANGE UP
PLAT MORPH BLD: ABNORMAL
PLATELET # BLD AUTO: 331 K/UL — SIGNIFICANT CHANGE UP (ref 150–400)
POIKILOCYTOSIS BLD QL AUTO: SIGNIFICANT CHANGE UP
POTASSIUM SERPL-MCNC: 3.3 MMOL/L — LOW (ref 3.5–5.3)
POTASSIUM SERPL-MCNC: 3.8 MMOL/L — SIGNIFICANT CHANGE UP (ref 3.5–5.3)
POTASSIUM SERPL-SCNC: 3.3 MMOL/L — LOW (ref 3.5–5.3)
POTASSIUM SERPL-SCNC: 3.8 MMOL/L — SIGNIFICANT CHANGE UP (ref 3.5–5.3)
PROT SERPL-MCNC: 5.7 G/DL — LOW (ref 6–8.3)
PROT SERPL-MCNC: 6.9 G/DL — SIGNIFICANT CHANGE UP (ref 6–8.3)
RBC # BLD: 4.36 M/UL — SIGNIFICANT CHANGE UP (ref 3.8–5.2)
RBC # FLD: 18.1 % — HIGH (ref 10.3–14.5)
RBC BLD AUTO: ABNORMAL
RH IG SCN BLD-IMP: POSITIVE — SIGNIFICANT CHANGE UP
SARS-COV-2 RNA SPEC QL NAA+PROBE: NEGATIVE — SIGNIFICANT CHANGE UP
SCHISTOCYTES BLD QL AUTO: SLIGHT — SIGNIFICANT CHANGE UP
SODIUM SERPL-SCNC: 144 MMOL/L — SIGNIFICANT CHANGE UP (ref 135–145)
SODIUM SERPL-SCNC: 146 MMOL/L — HIGH (ref 135–145)
TROPONIN T SERPL-MCNC: 0.27 NG/ML — CRITICAL HIGH (ref 0–0.01)
TROPONIN T SERPL-MCNC: 0.38 NG/ML — CRITICAL HIGH (ref 0–0.01)
TSH SERPL-MCNC: 1.69 UIU/ML — SIGNIFICANT CHANGE UP (ref 0.27–4.2)
WBC # BLD: 8.2 K/UL — SIGNIFICANT CHANGE UP (ref 3.8–10.5)
WBC # FLD AUTO: 8.2 K/UL — SIGNIFICANT CHANGE UP (ref 3.8–10.5)

## 2021-11-06 PROCEDURE — 72125 CT NECK SPINE W/O DYE: CPT | Mod: 26,MG

## 2021-11-06 PROCEDURE — 93010 ELECTROCARDIOGRAM REPORT: CPT

## 2021-11-06 PROCEDURE — 73502 X-RAY EXAM HIP UNI 2-3 VIEWS: CPT | Mod: 26,RT

## 2021-11-06 PROCEDURE — 99285 EMERGENCY DEPT VISIT HI MDM: CPT

## 2021-11-06 PROCEDURE — G1004: CPT

## 2021-11-06 PROCEDURE — 93010 ELECTROCARDIOGRAM REPORT: CPT | Mod: 77

## 2021-11-06 PROCEDURE — 71045 X-RAY EXAM CHEST 1 VIEW: CPT | Mod: 26

## 2021-11-06 PROCEDURE — 99223 1ST HOSP IP/OBS HIGH 75: CPT | Mod: GC

## 2021-11-06 PROCEDURE — 70450 CT HEAD/BRAIN W/O DYE: CPT | Mod: 26,MG

## 2021-11-06 PROCEDURE — 73564 X-RAY EXAM KNEE 4 OR MORE: CPT | Mod: 26,50

## 2021-11-06 PROCEDURE — 99222 1ST HOSP IP/OBS MODERATE 55: CPT

## 2021-11-06 RX ORDER — INFLUENZA VIRUS VACCINE 15; 15; 15; 15 UG/.5ML; UG/.5ML; UG/.5ML; UG/.5ML
0.7 SUSPENSION INTRAMUSCULAR ONCE
Refills: 0 | Status: DISCONTINUED | OUTPATIENT
Start: 2021-11-06 | End: 2021-11-10

## 2021-11-06 RX ORDER — SODIUM CHLORIDE 9 MG/ML
1000 INJECTION INTRAMUSCULAR; INTRAVENOUS; SUBCUTANEOUS ONCE
Refills: 0 | Status: COMPLETED | OUTPATIENT
Start: 2021-11-06 | End: 2021-11-06

## 2021-11-06 RX ORDER — SODIUM CHLORIDE 9 MG/ML
1000 INJECTION, SOLUTION INTRAVENOUS
Refills: 0 | Status: DISCONTINUED | OUTPATIENT
Start: 2021-11-06 | End: 2021-11-07

## 2021-11-06 RX ORDER — ACETAMINOPHEN 500 MG
650 TABLET ORAL EVERY 6 HOURS
Refills: 0 | Status: DISCONTINUED | OUTPATIENT
Start: 2021-11-06 | End: 2021-11-10

## 2021-11-06 RX ADMIN — SODIUM CHLORIDE 1000 MILLILITER(S): 9 INJECTION INTRAMUSCULAR; INTRAVENOUS; SUBCUTANEOUS at 18:11

## 2021-11-06 RX ADMIN — SODIUM CHLORIDE 1000 MILLILITER(S): 9 INJECTION INTRAMUSCULAR; INTRAVENOUS; SUBCUTANEOUS at 18:10

## 2021-11-06 RX ADMIN — SODIUM CHLORIDE 100 MILLILITER(S): 9 INJECTION, SOLUTION INTRAVENOUS at 23:36

## 2021-11-06 NOTE — ED PROVIDER NOTE - OBJECTIVE STATEMENT
85 y/o female with a PMHx of breast CA and who was recently diagnosed with PE (currently on Eliquis) who was BIBA as pt was found on the floor by her neighbor today. Pt states she lives alone and does not remember when she fell. She endorses pain to the right hip, however denies any associating symptoms. Pt denies fever, chills, HA, confusion, dizziness, chest pain, sob, abdominal pain, back/neck pain, numbness/tingling to extremities. As per neighbor, pt has a pill schedule and last pill that was taken was Thursday night. Therefore, pt suspected to have fallen Thursday night or yesterday since she failed to contact her neighbor yesterday to report her daily status.

## 2021-11-06 NOTE — H&P ADULT - ASSESSMENT
86y Female with PMH of breast cancer (s/p mastectomy in 2018, radiation in 2019) and recent submassive PE (in 10/2021, on Eliquis) admitted after unwitnessed fall. Found to have R hip fracture. Ortho planning for intervention tomorrow (11/07). 86y Female with PMH of breast cancer (s/p mastectomy in 2018, radiation in 2019) and recent submassive PE (in 10/2021, on Eliquis) admitted after unwitnessed fall. Found to have R hip fracture.

## 2021-11-06 NOTE — H&P ADULT - PROBLEM SELECTOR PLAN 4
Found to have DVT/PE on last admission in 10/2021. On Eliquis 5 mg bid.  - Hold Eliquis i/s/o orthopedic procedure  - SCDs AG to 20 with lactate to 2.9. Can be 2/2 rhabdomyolysis or starvation ketosis.  - Trend lactate  - Trend AG  - F/u BHB  - Consider D5 maintenance fluids if AG persistently elevated or ketonemia AG to 20 with lactate to 2.9 and likely starvation ketosis. elevated bhb   - start d5/lr   - Trend lactate  - Trend AG

## 2021-11-06 NOTE — ED ADULT NURSE REASSESSMENT NOTE - NS ED NURSE REASSESS COMMENT FT1
pt endorsed from RN To to given report to [employer]. currently, pt is resting on the stretcher, denies any pain, receiving 1 liter of NS 0.9% fluids. friend by bedside. report given to CÉSAR Rodriguez in [employer]. ticket to ride made. pt waiting for transportations.

## 2021-11-06 NOTE — H&P ADULT - PROBLEM SELECTOR PLAN 1
I/s/o fall. No intracranial pathology/fracture or C-spine fracture but evidence of valgus impacted femoral neck fracture on R hip. Reports no pain. RCRI of 1. Plan is for R hip pinning vs. hemiarthroplasty  - For OR schedule for 11/7 07:30AM  - Preop labs (CBC, BMP, coags, T&S, UA, ECG, CXR, COVID)  - NPO/no IVF past midnight Unwitnessed fall at home likely yesterday morning. Denies preceding lightheadedness, dizziness, or palpitations but memory of event is limited. CTH and C-spine without bleed or fractures.    - F/u ECG  - Trend cardiac enzymes  - F/u pro-BNP  - Orthostatics Unwitnessed fall at home likely yesterday morning. Denies preceding lightheadedness, dizziness, or palpitations but memory of event is limited. CTH and C-spine without bleed or fractures.    - F/u ECG  - Trend cardiac enzymes  - F/u pro-BNP  - F/u TSH  - F/u B12/folate  - Orthostatics Unwitnessed fall at home likely yesterday morning. Denies preceding lightheadedness, dizziness, or palpitations but memory of event is limited but denies LOC. CTH and C-spine without bleed or fractures.    - F/u ECG  - Trend cardiac enzymes  - F/u pro-BNP  - F/u TSH  - F/u B12/folate  - Orthostatics

## 2021-11-06 NOTE — H&P ADULT - PROBLEM SELECTOR PLAN 2
I/s/o recent fall. Lactic acidosis to 2.9 and CK elevated at 2565. S/p 2L NS.  - Trend CK  - No IVF after midnight I/s/o fall. Bruising around R hip, as well as R orbit, R shoulder, and b/l knees. Evidence of valgus impacted femoral neck fracture on R hip X-ray. Reports no pain currently. Plan is for R hip pinning vs. hemiarthroplasty when medically cleared. Not medically cleared until fall and infectious workup is complete.  -   - Preop labs (CBC, BMP, coags, T&S, UA, ECG, CXR, COVID)  - NPO/no IVF past midnight when medically cleared for surgery  - Tylenol PRN for pain I/s/o fall. Bruising around R hip, as well as R orbit, R shoulder, and b/l knees. Evidence of valgus impacted femoral neck fracture on R hip X-ray. Reports no pain currently. Plan is for R hip pinning vs. hemiarthroplasty when medically cleared. Not medically cleared until fall and infectious workup is complete.  - F/u ortho recs  - Preop labs (CBC, BMP, coags, T&S, UA, ECG, CXR, COVID)  - NPO/no IVF past midnight when medically cleared for surgery  - Tylenol PRN for pain I/s/o fall. Bruising around R hip, as well as R orbit, R shoulder, and b/l knees. Evidence of valgus impacted femoral neck fracture on R hip X-ray. Reports no pain currently. Plan is for R hip pinning vs. hemiarthroplasty when medically cleared.   - not currently optimized. f/up fall and infectious workup  - f/up cards recs   - Preop labs (CBC, BMP, coags, T&S, UA, ECG, CXR, COVID)  - NPO/no IVF past midnight when medically cleared for surgery  - Tylenol PRN for pain

## 2021-11-06 NOTE — H&P ADULT - PROBLEM SELECTOR PLAN 7
F: s/p 2L NS  E: Replete K<4, Mg<2  N: NPO at midnight  VTE Ppx: holding Eliquis i/s/o orthopedic surgery  GI: not needed  C: Full Code  D: RMF for orthopedic surgery Hx of breast cancer (s/p resection and axillary LN dissection). Follows with Dr. Sheridan.  - Continue to follow up as outpatient

## 2021-11-06 NOTE — H&P ADULT - PROBLEM SELECTOR PLAN 3
AG to 20 with lactate to 2.9. Likely 2/2 rhabdomyolysis, though can in part be due to starvation ketosis.  - Trend lactate  - BHB Afebrile, hemodynamically stable. No cough, h/a, or urinary symptoms. No leukocytosis but bandemia on labs. CXR unremarkable.  - Obtain rectal temperature  - F/u BCx  - Collect UA Afebrile, hemodynamically stable. No cough, h/a, or urinary symptoms. No leukocytosis but bandemia on labs. CXR unremarkable. CXR w/o infiltrate  - Obtain rectal temperature  - F/u BCx  - Collect UA

## 2021-11-06 NOTE — ED PROVIDER NOTE - ATTENDING CONTRIBUTION TO CARE
85 yo f w hx of PE (Eliquis) presents to ED with concern for unwitnessed fall today.  She was found today by neighbor.  Patient states she does not recall falling or exactly how long she was down for.  She notes one episode of emesis today.  No CP, SOB, fever, chills.  On exam, patient is non toxic in appearance.  HRRR, lungs clear.  Abd soft, NT/ND.  + TTP right hip, bilateral knees with overlying ecchymosis.  + Hematoma to right lateral hip.  No leg shortening and inversion/eversion.  Will obtain labs, imaging and plan for admission.

## 2021-11-06 NOTE — ED ADULT TRIAGE NOTE - CHIEF COMPLAINT QUOTE
Patient BIBA, s/p fall, as per EMS and neighbor, patient found down on floor of her apt., lives alone, estimated to have been down X 2 days as it was the last time the neighbor checked on her.  Patient arrives a/oX 2, noted large hematoma to right orbital area w/ dried blood noted, patient found incontinent of stool and urine by EMS.  Patient found hypotensive by EMS, SBP 80s, improved after received NSS 600ml IVF.  PMHx Eliquis, Hx of PE.

## 2021-11-06 NOTE — H&P ADULT - PROBLEM SELECTOR PLAN 9
F: s/p 2L NS  E: Replete K<4, Mg<2  N: Regular diet; make NPO prior to orthopedic surgery  VTE Ppx: holding Eliquis i/s/o orthopedic surgery, SCDs  GI: not needed  C: Full Code  D: RMF for orthopedic surgery F: s/p 2L NS  E: Replete K<4, Mg<2  N: Regular diet; make NPO prior to orthopedic surgery  VTE Ppx: holding Eliquis i/s/o orthopedic surgery  GI: not needed  C: Full Code  D: RMF for orthopedic surgery

## 2021-11-06 NOTE — ED ADULT NURSE NOTE - OBJECTIVE STATEMENT
Patient BIBEMs s/p fall of unknown origin and unwitnessed, patient found on floor by neighbor. Patient unable to recall circumstances around fall. Per EMS, patient was hypotensive when found with unreadable BP. VS upon arrival s/p fluids by EMS. Patient AAOx3 on arrival (person, place, day). Hematoma noted to right orbital, ecchymosis to right hip and b/l knees. Abrasion to L elbow. No active open wounds noted.

## 2021-11-06 NOTE — H&P ADULT - PROBLEM SELECTOR PLAN 8
Bilirubin to 1.8 on labs, up from 0.7 during last admission. ALP is upper limit of normal.  - Trend CMP

## 2021-11-06 NOTE — PATIENT PROFILE ADULT - VISION (WITH CORRECTIVE LENSES IF THE PATIENT USUALLY WEARS THEM):
patient has glasses at home/Normal vision: sees adequately in most situations; can see medication labels, newsprint

## 2021-11-06 NOTE — ED ADULT NURSE NOTE - COVID-19  TEST TYPE
Sal Pereira), Urology  [address] [place] [address]  Phone: [phone]  Fax: [phone]    Elvira Morales), Internal Medicine; Nephrology  [address] [id number]  Phone: [phone] Sal Pereira), Urology  [address] [facility] [address]  [place] [address]  Phone: [phone]  Fax: [phone]    Elvira Morales), Internal Medicine; Nephrology  [address] [place] [address]  Phone: [phone]    Amari Roper), Psychiatry  [address] [place] [address]  Phone: [phone]  Fax: [phone] MOLECULAR PCR Sal Pereira), Urology  [address] [facility] [address]  [place] [address]  Phone: [phone]  Fax: [phone]    Elvira Morales), Internal Medicine; Nephrology  [address] [address] [address]  Phone: [phone]    Amari Roper), Psychiatry  [address] [place] [address]  Phone: [phone]  Fax: [phone]    Kaylee Mason), [facility] Medicine; Internal Medicine  [address] [address]  Phone: [phone]

## 2021-11-06 NOTE — ED PROVIDER NOTE - NS ED ROS FT
General: no fever, chills, confusion  Cardiac: no chest pain, chest tightness, palpitations  Lungs: no sob, difficulty breathing  Abdomen: no abdominal pain, nausea, vomiting, diarrhea, constipation, nml BM  : no dysuria, urinary frequency/urgency  MSK: no neck/back pain, reports right hip pain    All other systems negative except as per HPI

## 2021-11-06 NOTE — H&P ADULT - HISTORY OF PRESENT ILLNESS
HPI: 86y Female with PMH of *** who presents with   Denies fevers, chills, cough, chest pain, dyspnea, nausea, headache, diarrhea, sick contacts, change in urinary or bowel habits    In the ED:    - VS: Tmax: , HR:  , BP:  , RR: , O2: %     - Pertinent Labs:     - Imaging: CXR: CT: US: Cath: EKG:     - Tx/interventions:     - Consults: HPI: 86y Female with PMH of breast cancer (s/p mastectomy in 2018, radiation in 2019), recent submassive PE (in 10/2021, on Eliquis) admitted after unwitnessed fall. Patient does not remember many details of fall. Fell on her right side as she got up from the the couch. Denies head trauma or losing consciousness but does not remember details of fall. Denies preceding dizziness, lightheadedness, chest pain, dyspnea, or palpitations. Per caretaker, patient likely fell yesterday morning as she had not taken her pills or returned a check-in email. Caretaker found patient on the floor, incontinent and called ambulance. Patient reports that she feels generally more frail. Caretaker believes that patient has been very stressed and down when she had to renew her Green Card (she is from [place]) in addition to staying home during COVID. She feels as if patient has had more difficult of a time taking care of herself, even though at baseline, patient can perform all ADLs and IADLs. Patient walks with the assistance of a walker. Reports increasingly poor oral intake, though she eats a balanced diet including chicken, fish, and potatoes. Recently admitted to our instituion in 10/2021 for weakness found to have submassive PE and discharged on Eliquis. Denies fevers, chills, cough, chest pain, dyspnea, nausea, headache, diarrhea, sick contacts, change in urinary or bowel habits    In the ED:    - VS: Tmax: 97.9, HR: , BP: 124-145/81-96, RR: 16, O2: 95-98% on room air     - Pertinent Labs: WBC 8.20, Hb 12.4, Plt 331, Na 146, K 3.8, HCO3 18, AG 20, BUN 26, 0.99  lactate 2.3, t bili 1.8, AST/ALT 70/35, CK 2565    - Imaging: CT head with No intracranial hemorrhage or calvarial fracture. CT C-spine without fracture. CXR without infiltrates or consolidations. R hip X-ray showed valgus impacted femoral neck fracture    - Tx/interventions: NS 1L x 2    - Consults: orthopedics HPI: 86y Female with PMH of breast cancer (s/p mastectomy in 2018, radiation in 2019), recent submassive PE (in 10/2021, on Eliquis) admitted after unwitnessed fall. Patient does not remember many details of fall. Fell on her right side as she got up from the the couch. Denies head trauma or losing consciousness but does not remember details of fall. Denies preceding dizziness, lightheadedness, chest pain, dyspnea, or palpitations. Per caretaker, patient likely fell yesterday morning as she had not taken her pills or returned a check-in email. Caretaker found patient on the floor, incontinent and called ambulance. Patient reports that she feels generally more frail. Caretaker believes that patient has been very stressed and down when she had to renew her Green Card (she is from [place]) in addition to staying home during COVID. She feels as if patient has had more difficult of a time taking care of herself, even though at baseline, patient can perform all ADLs and IADLs. Patient walks with the assistance of a walker. Reports increasingly poor oral intake, though she eats a balanced diet including chicken, fish, and potatoes. Recently admitted to our instituion in 10/2021 for weakness found to have submassive PE and discharged on Eliquis. Denies fevers, chills, cough, chest pain, dyspnea, nausea, headache, diarrhea, sick contacts, change in urinary or bowel habits.    In the ED:    - VS: Tmax: 97.9, HR: , BP: 124-145/81-96, RR: 16, O2: 95-98% on room air     - Pertinent Labs: WBC 8.20, Hb 12.4, Plt 331, Na 146, K 3.8, HCO3 18, AG 20, BUN 26, 0.99  lactate 2.3, t bili 1.8, AST/ALT 70/35, CK 2565    - Imaging: CT head with No intracranial hemorrhage or calvarial fracture. CT C-spine without fracture. CXR without infiltrates or consolidations. R hip X-ray showed valgus impacted femoral neck fracture    - Tx/interventions: NS 1L x 2    - Consults: orthopedics

## 2021-11-06 NOTE — H&P ADULT - PROBLEM SELECTOR PLAN 6
Bilirubin to 1.8 on labs, up from 0.7 during last admission. ALP is upper limit of normal.  - Trend CMP  - If persistently elevated, consider RUQ US Found to have DVT/PE on last admission in 10/2021. On Eliquis 5 mg bid.  - Hold Eliquis i/s/o upcoming orthopedic procedure  - SCDs Found to have DVT/PE on last admission in 10/2021. On Eliquis 5 mg bid.  - Hold Eliquis  - no current signs of bleeding, repeat cbc- if stable start hep ggt for DVTs and PEs  - pulm consult/clearance given recent DVT and PEs  - repeat dopplers

## 2021-11-06 NOTE — H&P ADULT - PROBLEM SELECTOR PLAN 5
Hx of breast cancer (s/p resection and axillary LN dissection). Follows with Dr. Sheridan.  - Continue to follow up as outpatient I/s/o recent fall. Lactic acidosis to 2.9 and CK elevated at 2565. S/p 2L NS.  - Trend CK  - Considering maintenance fluids as above I/s/o recent fall. Lactic acidosis to 2.9 and CK elevated at 2565. S/p 2L NS.  -mild CK, Lfts  - Trend CK  - maintenance fluids as above    #Troponemia: denies CP/sob. in setting of fall and rhabdo. EKG w/o ischemic changes  - cards recs   - trend trops

## 2021-11-06 NOTE — ED PROVIDER NOTE - CLINICAL SUMMARY MEDICAL DECISION MAKING FREE TEXT BOX
87 y/o female here in the ED found on the floor by her neighbor today. Pt found to have a right hip fx on xray. CXR negative for pneumo or rib fx (discussed with rads resident and confirmed negative findings). Xray knee negative. VS nml. Pt noted with elevated AG, hypernatremia likely secondary to dehydration and poor po intake. CK elevated, will hydrate and admit for rhabdo. CT head/cervical negative. +bandemia, nml wbc with mildly elevated lactate; pend urine/blood cx. Ortho consulted and discussed admission with JENNY.

## 2021-11-06 NOTE — H&P ADULT - NSHPPHYSICALEXAM_GEN_ALL_CORE
Gen: NAD, laying in bed, alert, interactive, poor hygiene  HEENT: ecchymosis around R eye, PERRL, anicteric sclera, dry MM  Cardio: +S1/S2, RRR, no murmurs  Resp: CTA b/l, no w/r/r  GI: +BS x4, NT/ND  Ext: poor skin turgor, calfs non-tender w/o erythema  Vasc: 2+ peripheral pulses  Skin: warm, dry, and intact. ecchymoses of the R hip and R and L knees  Neuro: AAOx3, no focal deficits, 5/5 UE and LE strength, limited R leg movement due to pain

## 2021-11-06 NOTE — ED PROVIDER NOTE - PHYSICAL EXAMINATION
CONSTITUTIONAL: In NAD  SKIN: Warm and dry, no acute rash, areas of possible deep tissue injury on sacrum, right shoulder and right hip, skin intact  HEAD: Normocephalic; atraumatic.  EYES: PERRL, EOM intact; conjunctiva and sclera clear.  ENT: No nasal discharge; airway clear.  NECK: Supple; non tender.  CARD: S1, S2 normal; no murmurs, gallops, or rubs. Regular rate and rhythm.  RESP: No wheezes, rales or rhonchi.  ABD: Normal bowel sounds; soft; non-distended; non-tender; no hepatosplenomegaly.  EXT: Normal ROM. No clubbing, cyanosis or edema. Neck with good rom and no ttp, right hip with + ttp to right trochanteric area with lrom with hip extension and ecchymosis. + distal pulse and sensation and reflex intact.   NEURO: Alert, oriented.   PSYCH: Cooperative, appropriate.

## 2021-11-06 NOTE — H&P ADULT - NSHPLABSRESULTS_GEN_ALL_CORE
12.4   8.20  )-----------( 331      ( 06 Nov 2021 16:26 )             37.0     11-06    146<H>  |  108  |  26<H>  ----------------------------<  138<H>  3.8   |  18<L>  |  0.99    Ca    9.5      06 Nov 2021 16:26    TPro  6.9  /  Alb  3.6  /  TBili  1.8<H>  /  DBili  x   /  AST  70<H>  /  ALT  35  /  AlkPhos  128<H>  11-06        CAPILLARY BLOOD GLUCOSE          I&O's Summary

## 2021-11-06 NOTE — H&P ADULT - ATTENDING COMMENTS
#Fall: presents w/ unwitnessed fall and found to have hip fx, down for likely>1day; denies LOC, head trauma. Pt described mechanical fall, however is a poor historian. No focal neuro deficit. CTH/cervical wnl. EKG nsr,, non ischemic. Denies CP/SOB. +troponemia in setting of rhabdo; now downtrending. F/up orthostatics, tsh, ortho recs, knee xrays wnl, f/up final read. F/up infectious w/up below    #Bandemia: +36% bandemia on presentation. +lactate. No clear source of infxn. CXR w/o infiltrate. UA pending. F/up blood cultures. Trend cbc w/ diff. Monitor off abx for now pending w/up    #Hip Fx: Ortho following- planned for R hip pinning vs. Hemiarthroplasty. However pt currently not optimized. F/up cards recs, trend troponins. In addition- pt w/ recent PE/DVTs- unclear complaince to AC- start hep ggt if hgb stable in AM (took eliquis last night); pulm clearance, f/up repeat dopplers   #AGMA: 2/2 lacate and likely starvation ketosis. Now downtrending. c/w d5/LR- monitor BMP, electrolytes. Lactate now cleared.

## 2021-11-07 ENCOUNTER — TRANSCRIPTION ENCOUNTER (OUTPATIENT)
Age: 86
End: 2021-11-07

## 2021-11-07 DIAGNOSIS — R33.9 RETENTION OF URINE, UNSPECIFIED: ICD-10-CM

## 2021-11-07 DIAGNOSIS — K59.00 CONSTIPATION, UNSPECIFIED: ICD-10-CM

## 2021-11-07 DIAGNOSIS — D64.9 ANEMIA, UNSPECIFIED: ICD-10-CM

## 2021-11-07 DIAGNOSIS — A41.9 SEPSIS, UNSPECIFIED ORGANISM: ICD-10-CM

## 2021-11-07 LAB
ACANTHOCYTES BLD QL SMEAR: SLIGHT — SIGNIFICANT CHANGE UP
ALBUMIN SERPL ELPH-MCNC: 2.2 G/DL — LOW (ref 3.3–5)
ALBUMIN SERPL ELPH-MCNC: 2.5 G/DL — LOW (ref 3.3–5)
ALP SERPL-CCNC: 89 U/L — SIGNIFICANT CHANGE UP (ref 40–120)
ALP SERPL-CCNC: 96 U/L — SIGNIFICANT CHANGE UP (ref 40–120)
ALT FLD-CCNC: 27 U/L — SIGNIFICANT CHANGE UP (ref 10–45)
ALT FLD-CCNC: 30 U/L — SIGNIFICANT CHANGE UP (ref 10–45)
ANION GAP SERPL CALC-SCNC: 11 MMOL/L — SIGNIFICANT CHANGE UP (ref 5–17)
ANION GAP SERPL CALC-SCNC: 12 MMOL/L — SIGNIFICANT CHANGE UP (ref 5–17)
ANISOCYTOSIS BLD QL: SLIGHT — SIGNIFICANT CHANGE UP
APPEARANCE UR: CLEAR — SIGNIFICANT CHANGE UP
APTT BLD: 30.1 SEC — SIGNIFICANT CHANGE UP (ref 27.5–35.5)
APTT BLD: 36.2 SEC — HIGH (ref 27.5–35.5)
AST SERPL-CCNC: 52 U/L — HIGH (ref 10–40)
AST SERPL-CCNC: 52 U/L — HIGH (ref 10–40)
BACTERIA # UR AUTO: PRESENT /HPF
BASOPHILS # BLD AUTO: 0 K/UL — SIGNIFICANT CHANGE UP (ref 0–0.2)
BASOPHILS NFR BLD AUTO: 0 % — SIGNIFICANT CHANGE UP (ref 0–2)
BILIRUB SERPL-MCNC: 1 MG/DL — SIGNIFICANT CHANGE UP (ref 0.2–1.2)
BILIRUB SERPL-MCNC: 1.1 MG/DL — SIGNIFICANT CHANGE UP (ref 0.2–1.2)
BILIRUB UR-MCNC: ABNORMAL
BLD GP AB SCN SERPL QL: NEGATIVE — SIGNIFICANT CHANGE UP
BUN SERPL-MCNC: 23 MG/DL — SIGNIFICANT CHANGE UP (ref 7–23)
BUN SERPL-MCNC: 24 MG/DL — HIGH (ref 7–23)
BURR CELLS BLD QL SMEAR: PRESENT — SIGNIFICANT CHANGE UP
BURR CELLS BLD QL SMEAR: SIGNIFICANT CHANGE UP
BURR CELLS BLD QL SMEAR: SLIGHT — SIGNIFICANT CHANGE UP
CALCIUM SERPL-MCNC: 8.2 MG/DL — LOW (ref 8.4–10.5)
CALCIUM SERPL-MCNC: 8.6 MG/DL — SIGNIFICANT CHANGE UP (ref 8.4–10.5)
CHLORIDE SERPL-SCNC: 113 MMOL/L — HIGH (ref 96–108)
CHLORIDE SERPL-SCNC: 114 MMOL/L — HIGH (ref 96–108)
CK MB CFR SERPL CALC: 24.8 NG/ML — HIGH (ref 0–6.7)
CK SERPL-CCNC: 1194 U/L — HIGH (ref 25–170)
CO2 SERPL-SCNC: 17 MMOL/L — LOW (ref 22–31)
CO2 SERPL-SCNC: 18 MMOL/L — LOW (ref 22–31)
COLOR SPEC: YELLOW — SIGNIFICANT CHANGE UP
COVID-19 NUCLEOCAPSID GAM AB INTERP: NEGATIVE — SIGNIFICANT CHANGE UP
COVID-19 NUCLEOCAPSID TOTAL GAM ANTIBODY RESULT: 0.08 INDEX — SIGNIFICANT CHANGE UP
COVID-19 SPIKE DOMAIN AB INTERP: POSITIVE
COVID-19 SPIKE DOMAIN ANTIBODY RESULT: 64.2 U/ML — HIGH
CREAT SERPL-MCNC: 0.75 MG/DL — SIGNIFICANT CHANGE UP (ref 0.5–1.3)
CREAT SERPL-MCNC: 0.79 MG/DL — SIGNIFICANT CHANGE UP (ref 0.5–1.3)
DIFF PNL FLD: ABNORMAL
ELLIPTOCYTES BLD QL SMEAR: SLIGHT — SIGNIFICANT CHANGE UP
ELLIPTOCYTES BLD QL SMEAR: SLIGHT — SIGNIFICANT CHANGE UP
EOSINOPHIL # BLD AUTO: 0 K/UL — SIGNIFICANT CHANGE UP (ref 0–0.5)
EOSINOPHIL NFR BLD AUTO: 0 % — SIGNIFICANT CHANGE UP (ref 0–6)
EPI CELLS # UR: SIGNIFICANT CHANGE UP /HPF (ref 0–5)
FOLATE SERPL-MCNC: <2 NG/ML — LOW
GIANT PLATELETS BLD QL SMEAR: PRESENT — SIGNIFICANT CHANGE UP
GLUCOSE BLDC GLUCOMTR-MCNC: 110 MG/DL — HIGH (ref 70–99)
GLUCOSE BLDC GLUCOMTR-MCNC: 115 MG/DL — HIGH (ref 70–99)
GLUCOSE BLDC GLUCOMTR-MCNC: 130 MG/DL — HIGH (ref 70–99)
GLUCOSE BLDC GLUCOMTR-MCNC: 151 MG/DL — HIGH (ref 70–99)
GLUCOSE BLDC GLUCOMTR-MCNC: 153 MG/DL — HIGH (ref 70–99)
GLUCOSE SERPL-MCNC: 135 MG/DL — HIGH (ref 70–99)
GLUCOSE SERPL-MCNC: 150 MG/DL — HIGH (ref 70–99)
GLUCOSE UR QL: NEGATIVE — SIGNIFICANT CHANGE UP
HCT VFR BLD CALC: 27.3 % — LOW (ref 34.5–45)
HCT VFR BLD CALC: 29.1 % — LOW (ref 34.5–45)
HCT VFR BLD CALC: 30.7 % — LOW (ref 34.5–45)
HGB BLD-MCNC: 9 G/DL — LOW (ref 11.5–15.5)
HGB BLD-MCNC: 9.5 G/DL — LOW (ref 11.5–15.5)
HGB BLD-MCNC: 9.8 G/DL — LOW (ref 11.5–15.5)
HYALINE CASTS # UR AUTO: ABNORMAL /LPF (ref 0–2)
HYPOCHROMIA BLD QL: SIGNIFICANT CHANGE UP
INR BLD: 1.6 — HIGH (ref 0.88–1.16)
INR BLD: 1.76 — HIGH (ref 0.88–1.16)
KETONES UR-MCNC: 15 MG/DL
LACTATE SERPL-SCNC: 1.7 MMOL/L — SIGNIFICANT CHANGE UP (ref 0.5–2)
LACTATE SERPL-SCNC: 1.7 MMOL/L — SIGNIFICANT CHANGE UP (ref 0.5–2)
LEUKOCYTE ESTERASE UR-ACNC: NEGATIVE — SIGNIFICANT CHANGE UP
LYMPHOCYTES # BLD AUTO: 0.12 K/UL — LOW (ref 1–3.3)
LYMPHOCYTES # BLD AUTO: 0.3 K/UL — LOW (ref 1–3.3)
LYMPHOCYTES # BLD AUTO: 0.54 K/UL — LOW (ref 1–3.3)
LYMPHOCYTES # BLD AUTO: 0.9 % — LOW (ref 13–44)
LYMPHOCYTES # BLD AUTO: 2.6 % — LOW (ref 13–44)
LYMPHOCYTES # BLD AUTO: 3.5 % — LOW (ref 13–44)
MACROCYTES BLD QL: SLIGHT — SIGNIFICANT CHANGE UP
MAGNESIUM SERPL-MCNC: 2.4 MG/DL — SIGNIFICANT CHANGE UP (ref 1.6–2.6)
MANUAL SMEAR VERIFICATION: SIGNIFICANT CHANGE UP
MCHC RBC-ENTMCNC: 27.6 PG — SIGNIFICANT CHANGE UP (ref 27–34)
MCHC RBC-ENTMCNC: 28.4 PG — SIGNIFICANT CHANGE UP (ref 27–34)
MCHC RBC-ENTMCNC: 28.6 PG — SIGNIFICANT CHANGE UP (ref 27–34)
MCHC RBC-ENTMCNC: 31.9 GM/DL — LOW (ref 32–36)
MCHC RBC-ENTMCNC: 32.6 GM/DL — SIGNIFICANT CHANGE UP (ref 32–36)
MCHC RBC-ENTMCNC: 33 GM/DL — SIGNIFICANT CHANGE UP (ref 32–36)
MCV RBC AUTO: 84.6 FL — SIGNIFICANT CHANGE UP (ref 80–100)
MCV RBC AUTO: 86.7 FL — SIGNIFICANT CHANGE UP (ref 80–100)
MCV RBC AUTO: 89 FL — SIGNIFICANT CHANGE UP (ref 80–100)
METAMYELOCYTES # FLD: 1.7 % — HIGH (ref 0–0)
MONOCYTES # BLD AUTO: 0 K/UL — SIGNIFICANT CHANGE UP (ref 0–0.9)
MONOCYTES # BLD AUTO: 0 K/UL — SIGNIFICANT CHANGE UP (ref 0–0.9)
MONOCYTES # BLD AUTO: 0.2 K/UL — SIGNIFICANT CHANGE UP (ref 0–0.9)
MONOCYTES NFR BLD AUTO: 0 % — LOW (ref 2–14)
MONOCYTES NFR BLD AUTO: 0 % — LOW (ref 2–14)
MONOCYTES NFR BLD AUTO: 1.7 % — LOW (ref 2–14)
NEUTROPHILS # BLD AUTO: 11.14 K/UL — HIGH (ref 1.8–7.4)
NEUTROPHILS # BLD AUTO: 12.66 K/UL — HIGH (ref 1.8–7.4)
NEUTROPHILS # BLD AUTO: 15.03 K/UL — HIGH (ref 1.8–7.4)
NEUTROPHILS NFR BLD AUTO: 70.4 % — SIGNIFICANT CHANGE UP (ref 43–77)
NEUTROPHILS NFR BLD AUTO: 80.9 % — HIGH (ref 43–77)
NEUTROPHILS NFR BLD AUTO: 90.4 % — HIGH (ref 43–77)
NEUTS BAND # BLD: 14.8 % — HIGH (ref 0–8)
NEUTS BAND # BLD: 27 % — HIGH (ref 0–8)
NEUTS BAND # BLD: 6.1 % — SIGNIFICANT CHANGE UP (ref 0–8)
NITRITE UR-MCNC: POSITIVE
OVALOCYTES BLD QL SMEAR: SLIGHT — SIGNIFICANT CHANGE UP
PH UR: 5.5 — SIGNIFICANT CHANGE UP (ref 5–8)
PHOSPHATE SERPL-MCNC: 2.5 MG/DL — SIGNIFICANT CHANGE UP (ref 2.5–4.5)
PLAT MORPH BLD: ABNORMAL
PLAT MORPH BLD: NORMAL — SIGNIFICANT CHANGE UP
PLAT MORPH BLD: NORMAL — SIGNIFICANT CHANGE UP
PLATELET # BLD AUTO: 235 K/UL — SIGNIFICANT CHANGE UP (ref 150–400)
PLATELET # BLD AUTO: 238 K/UL — SIGNIFICANT CHANGE UP (ref 150–400)
PLATELET # BLD AUTO: 253 K/UL — SIGNIFICANT CHANGE UP (ref 150–400)
POIKILOCYTOSIS BLD QL AUTO: SIGNIFICANT CHANGE UP
POTASSIUM SERPL-MCNC: 3.7 MMOL/L — SIGNIFICANT CHANGE UP (ref 3.5–5.3)
POTASSIUM SERPL-MCNC: 3.8 MMOL/L — SIGNIFICANT CHANGE UP (ref 3.5–5.3)
POTASSIUM SERPL-SCNC: 3.7 MMOL/L — SIGNIFICANT CHANGE UP (ref 3.5–5.3)
POTASSIUM SERPL-SCNC: 3.8 MMOL/L — SIGNIFICANT CHANGE UP (ref 3.5–5.3)
PROT SERPL-MCNC: 5.1 G/DL — LOW (ref 6–8.3)
PROT SERPL-MCNC: 5.4 G/DL — LOW (ref 6–8.3)
PROT UR-MCNC: 30 MG/DL
PROTHROM AB SERPL-ACNC: 18.8 SEC — HIGH (ref 10.6–13.6)
PROTHROM AB SERPL-ACNC: 20.6 SEC — HIGH (ref 10.6–13.6)
RBC # BLD: 3.15 M/UL — LOW (ref 3.8–5.2)
RBC # BLD: 3.44 M/UL — LOW (ref 3.8–5.2)
RBC # BLD: 3.45 M/UL — LOW (ref 3.8–5.2)
RBC # FLD: 18.3 % — HIGH (ref 10.3–14.5)
RBC # FLD: 18.6 % — HIGH (ref 10.3–14.5)
RBC # FLD: 18.9 % — HIGH (ref 10.3–14.5)
RBC BLD AUTO: ABNORMAL
RBC BLD AUTO: ABNORMAL
RBC BLD AUTO: SIGNIFICANT CHANGE UP
RBC CASTS # UR COMP ASSIST: ABNORMAL /HPF
RH IG SCN BLD-IMP: POSITIVE — SIGNIFICANT CHANGE UP
SARS-COV-2 IGG+IGM SERPL QL IA: 0.08 INDEX — SIGNIFICANT CHANGE UP
SARS-COV-2 IGG+IGM SERPL QL IA: 64.2 U/ML — HIGH
SARS-COV-2 IGG+IGM SERPL QL IA: NEGATIVE — SIGNIFICANT CHANGE UP
SARS-COV-2 IGG+IGM SERPL QL IA: POSITIVE
SCHISTOCYTES BLD QL AUTO: SLIGHT — SIGNIFICANT CHANGE UP
SCHISTOCYTES BLD QL AUTO: SLIGHT — SIGNIFICANT CHANGE UP
SODIUM SERPL-SCNC: 142 MMOL/L — SIGNIFICANT CHANGE UP (ref 135–145)
SODIUM SERPL-SCNC: 143 MMOL/L — SIGNIFICANT CHANGE UP (ref 135–145)
SP GR SPEC: >=1.03 — SIGNIFICANT CHANGE UP (ref 1–1.03)
SPHEROCYTES BLD QL SMEAR: SLIGHT — SIGNIFICANT CHANGE UP
SPHEROCYTES BLD QL SMEAR: SLIGHT — SIGNIFICANT CHANGE UP
TROPONIN T SERPL-MCNC: 0.18 NG/ML — CRITICAL HIGH (ref 0–0.01)
UROBILINOGEN FLD QL: 1 E.U./DL — SIGNIFICANT CHANGE UP
VIT B12 SERPL-MCNC: 836 PG/ML — SIGNIFICANT CHANGE UP (ref 232–1245)
WBC # BLD: 11.64 K/UL — HIGH (ref 3.8–10.5)
WBC # BLD: 13 K/UL — HIGH (ref 3.8–10.5)
WBC # BLD: 15.57 K/UL — HIGH (ref 3.8–10.5)
WBC # FLD AUTO: 11.64 K/UL — HIGH (ref 3.8–10.5)
WBC # FLD AUTO: 13 K/UL — HIGH (ref 3.8–10.5)
WBC # FLD AUTO: 15.57 K/UL — HIGH (ref 3.8–10.5)
WBC UR QL: < 5 /HPF — SIGNIFICANT CHANGE UP

## 2021-11-07 PROCEDURE — 93970 EXTREMITY STUDY: CPT | Mod: 26

## 2021-11-07 PROCEDURE — 72192 CT PELVIS W/O DYE: CPT | Mod: 26

## 2021-11-07 PROCEDURE — 99233 SBSQ HOSP IP/OBS HIGH 50: CPT | Mod: GC

## 2021-11-07 RX ORDER — FOLIC ACID 0.8 MG
1 TABLET ORAL DAILY
Refills: 0 | Status: DISCONTINUED | OUTPATIENT
Start: 2021-11-07 | End: 2021-11-10

## 2021-11-07 RX ORDER — SENNA PLUS 8.6 MG/1
2 TABLET ORAL AT BEDTIME
Refills: 0 | Status: DISCONTINUED | OUTPATIENT
Start: 2021-11-07 | End: 2021-11-10

## 2021-11-07 RX ORDER — INSULIN LISPRO 100/ML
VIAL (ML) SUBCUTANEOUS
Refills: 0 | Status: DISCONTINUED | OUTPATIENT
Start: 2021-11-07 | End: 2021-11-10

## 2021-11-07 RX ORDER — CHLORHEXIDINE GLUCONATE 213 G/1000ML
1 SOLUTION TOPICAL EVERY 12 HOURS
Refills: 0 | Status: DISCONTINUED | OUTPATIENT
Start: 2021-11-07 | End: 2021-11-07

## 2021-11-07 RX ORDER — SODIUM CHLORIDE 9 MG/ML
1000 INJECTION, SOLUTION INTRAVENOUS
Refills: 0 | Status: DISCONTINUED | OUTPATIENT
Start: 2021-11-07 | End: 2021-11-10

## 2021-11-07 RX ORDER — MAGNESIUM SULFATE 500 MG/ML
2 VIAL (ML) INJECTION ONCE
Refills: 0 | Status: COMPLETED | OUTPATIENT
Start: 2021-11-07 | End: 2021-11-07

## 2021-11-07 RX ORDER — DEXTROSE 50 % IN WATER 50 %
25 SYRINGE (ML) INTRAVENOUS ONCE
Refills: 0 | Status: DISCONTINUED | OUTPATIENT
Start: 2021-11-07 | End: 2021-11-10

## 2021-11-07 RX ORDER — DEXTROSE 50 % IN WATER 50 %
12.5 SYRINGE (ML) INTRAVENOUS ONCE
Refills: 0 | Status: DISCONTINUED | OUTPATIENT
Start: 2021-11-07 | End: 2021-11-10

## 2021-11-07 RX ORDER — GLUCAGON INJECTION, SOLUTION 0.5 MG/.1ML
1 INJECTION, SOLUTION SUBCUTANEOUS ONCE
Refills: 0 | Status: DISCONTINUED | OUTPATIENT
Start: 2021-11-07 | End: 2021-11-10

## 2021-11-07 RX ORDER — POTASSIUM CHLORIDE 20 MEQ
20 PACKET (EA) ORAL ONCE
Refills: 0 | Status: COMPLETED | OUTPATIENT
Start: 2021-11-07 | End: 2021-11-07

## 2021-11-07 RX ORDER — POVIDONE-IODINE 5 %
1 AEROSOL (ML) TOPICAL ONCE
Refills: 0 | Status: DISCONTINUED | OUTPATIENT
Start: 2021-11-07 | End: 2021-11-10

## 2021-11-07 RX ORDER — POTASSIUM CHLORIDE 20 MEQ
40 PACKET (EA) ORAL ONCE
Refills: 0 | Status: COMPLETED | OUTPATIENT
Start: 2021-11-07 | End: 2021-11-07

## 2021-11-07 RX ORDER — CEFTRIAXONE 500 MG/1
1000 INJECTION, POWDER, FOR SOLUTION INTRAMUSCULAR; INTRAVENOUS EVERY 24 HOURS
Refills: 0 | Status: DISCONTINUED | OUTPATIENT
Start: 2021-11-07 | End: 2021-11-09

## 2021-11-07 RX ORDER — DEXTROSE 50 % IN WATER 50 %
15 SYRINGE (ML) INTRAVENOUS ONCE
Refills: 0 | Status: DISCONTINUED | OUTPATIENT
Start: 2021-11-07 | End: 2021-11-10

## 2021-11-07 RX ORDER — APIXABAN 2.5 MG/1
5 TABLET, FILM COATED ORAL EVERY 12 HOURS
Refills: 0 | Status: DISCONTINUED | OUTPATIENT
Start: 2021-11-07 | End: 2021-11-10

## 2021-11-07 RX ORDER — POLYETHYLENE GLYCOL 3350 17 G/17G
17 POWDER, FOR SOLUTION ORAL EVERY 24 HOURS
Refills: 0 | Status: DISCONTINUED | OUTPATIENT
Start: 2021-11-07 | End: 2021-11-08

## 2021-11-07 RX ADMIN — APIXABAN 5 MILLIGRAM(S): 2.5 TABLET, FILM COATED ORAL at 17:27

## 2021-11-07 RX ADMIN — Medication 40 MILLIEQUIVALENT(S): at 03:05

## 2021-11-07 RX ADMIN — SENNA PLUS 2 TABLET(S): 8.6 TABLET ORAL at 22:00

## 2021-11-07 RX ADMIN — Medication 1 TABLET(S): at 13:55

## 2021-11-07 RX ADMIN — Medication 50 GRAM(S): at 03:05

## 2021-11-07 RX ADMIN — Medication 20 MILLIEQUIVALENT(S): at 19:34

## 2021-11-07 RX ADMIN — Medication 1 MILLIGRAM(S): at 13:55

## 2021-11-07 RX ADMIN — SODIUM CHLORIDE 100 MILLILITER(S): 9 INJECTION, SOLUTION INTRAVENOUS at 09:04

## 2021-11-07 RX ADMIN — POLYETHYLENE GLYCOL 3350 17 GRAM(S): 17 POWDER, FOR SOLUTION ORAL at 13:55

## 2021-11-07 RX ADMIN — CEFTRIAXONE 100 MILLIGRAM(S): 500 INJECTION, POWDER, FOR SOLUTION INTRAMUSCULAR; INTRAVENOUS at 12:05

## 2021-11-07 RX ADMIN — Medication 20 MILLIEQUIVALENT(S): at 13:55

## 2021-11-07 RX ADMIN — Medication 1: at 21:48

## 2021-11-07 RX ADMIN — Medication 1: at 17:26

## 2021-11-07 NOTE — PROGRESS NOTE ADULT - PROBLEM SELECTOR PLAN 12
F: s/p 2L NS, now on D5LR at 100cc/hr  E: Replete K<4, Mg<2  N: Regular diet; make NPO prior to orthopedic surgery  VTE Ppx: holding Eliquis i/s/o orthopedic surgery, SCDs for now, will start heparin if Hgb stable.  GI: not needed  C: Full Code  D: RMF for orthopedic surgery

## 2021-11-07 NOTE — PROGRESS NOTE ADULT - PROBLEM SELECTOR PLAN 8
Found to have DVT/PE on last admission in 10/2021. On Eliquis 5 mg bid.  - Hold Eliquis  - no current signs of bleeding, repeat cbc- if stable start hep ggt for DVTs and PEs  - pulm consult/clearance given recent DVT and PEs  - repeat dopplers Found to have DVT/PE on last admission in 10/2021. On Eliquis 5 mg bid.  - Hold Eliquis  - pulm consult/clearance given recent DVT and PEs Bladder scan showed >300cc in bladder. urology consulted for difficult straight cath. ramirez was placed by urology.  - UA - + for nitrites and bacteria, - for leuk esterases and wbc  - denies dysuria however did have urinary retention on bladder scan - 300 UOP after ramirez placement by Urology  - f/u Ucx

## 2021-11-07 NOTE — PROGRESS NOTE ADULT - SUBJECTIVE AND OBJECTIVE BOX
CC: Patient is a 86y old  Female who presents with a chief complaint of Unwitnessed Fall (07 Nov 2021 08:07)      INTERVAL EVENTS: JANIE    SUBJECTIVE / INTERVAL HPI: Patient seen and examined at bedside. Denies any pain. Denies any weakness. Endorses fatigue because she didn't sleep well last night.     ROS: negative unless otherwise stated above.    VITAL SIGNS:  Vital Signs Last 24 Hrs  T(C): 36.5 (07 Nov 2021 06:57), Max: 36.6 (06 Nov 2021 15:11)  T(F): 97.7 (07 Nov 2021 06:57), Max: 97.9 (06 Nov 2021 15:11)  HR: 88 (07 Nov 2021 07:51) (70 - 106)  BP: 100/64 (07 Nov 2021 07:51) (93/59 - 145/96)  BP(mean): --  RR: 17 (07 Nov 2021 07:51) (16 - 18)  SpO2: 96% (07 Nov 2021 07:51) (95% - 98%)      11-06-21 @ 08:01  -  11-07-21 @ 07:00  --------------------------------------------------------  IN: 0 mL / OUT: 300 mL / NET: -300 mL        PHYSICAL EXAM:    General: NAD  HEENT: MMM, R brow echymoses  Neck: supple  Cardiovascular: +S1/S2; RRR  Respiratory: CTA B/L; no W/R/R  Gastrointestinal: soft, NT/ND  Extremities: WWP; no edema, clubbing or cyanosis, R hip warm, erythematous, nontender to palpation  Vascular: 2+ radial, DP/PT pulses B/L  Neurological: AAOx3; no focal deficits    MEDICATIONS:  MEDICATIONS  (STANDING):  chlorhexidine 2% Cloths 1 Application(s) Topical every 12 hours  dextrose 40% Gel 15 Gram(s) Oral once  dextrose 5% + lactated ringers. 1000 milliLiter(s) (100 mL/Hr) IV Continuous <Continuous>  dextrose 5%. 1000 milliLiter(s) (100 mL/Hr) IV Continuous <Continuous>  dextrose 5%. 1000 milliLiter(s) (50 mL/Hr) IV Continuous <Continuous>  dextrose 50% Injectable 25 Gram(s) IV Push once  dextrose 50% Injectable 12.5 Gram(s) IV Push once  dextrose 50% Injectable 25 Gram(s) IV Push once  glucagon  Injectable 1 milliGRAM(s) IntraMuscular once  influenza  Vaccine (HIGH DOSE) 0.7 milliLiter(s) IntraMuscular once  insulin lispro (ADMELOG) corrective regimen sliding scale   SubCutaneous Before meals and at bedtime  povidone iodine 5% Nasal Swab 1 Application(s) Both Nostrils once    MEDICATIONS  (PRN):  acetaminophen     Tablet .. 650 milliGRAM(s) Oral every 6 hours PRN Temp greater or equal to 38C (100.4F), Mild Pain (1 - 3)      ALLERGIES:  Allergies    No Known Allergies    Intolerances        LABS:                        9.0    13.00 )-----------( 235      ( 07 Nov 2021 08:21 )             27.3     11-07    143  |  114<H>  |  23  ----------------------------<  135<H>  3.8   |  17<L>  |  0.79    Ca    8.2<L>      07 Nov 2021 08:21  Phos  2.5     11-07  Mg     2.4     11-07    TPro  5.1<L>  /  Alb  2.2<L>  /  TBili  1.0  /  DBili  x   /  AST  52<H>  /  ALT  27  /  AlkPhos  89  11-07    PT/INR - ( 07 Nov 2021 08:21 )   PT: 18.8 sec;   INR: 1.60          PTT - ( 07 Nov 2021 08:21 )  PTT:30.1 sec  Urinalysis Basic - ( 07 Nov 2021 06:42 )    Color: Yellow / Appearance: Clear / SG: >=1.030 / pH: x  Gluc: x / Ketone: 15 mg/dL  / Bili: Moderate / Urobili: 1.0 E.U./dL   Blood: x / Protein: 30 mg/dL / Nitrite: POSITIVE   Leuk Esterase: NEGATIVE / RBC: 5-10 /HPF / WBC < 5 /HPF   Sq Epi: x / Non Sq Epi: 0-5 /HPF / Bacteria: Present /HPF      CAPILLARY BLOOD GLUCOSE      POCT Blood Glucose.: 130 mg/dL (07 Nov 2021 08:38)      RADIOLOGY & ADDITIONAL TESTS: Reviewed.

## 2021-11-07 NOTE — CONSULT NOTE ADULT - TIME BILLING
This is a 86y Female with PMH of breast cancer (s/p mastectomy in 2018, radiation in 2019), recent subsegmental PE (in 10/2021, on Eliquis) admitted after unwitnessed fall likely mechanical in nature, now with R hip fx, pending OR likely tomorrow. Cardiology consulted for preop risk stratification and trop elevation.

## 2021-11-07 NOTE — CONSULT NOTE ADULT - ASSESSMENT
This is a 86y Female with PMH of breast cancer (s/p mastectomy in 2018, radiation in 2019), recent subsegmental PE (in 10/2021, on Eliquis) admitted after unwitnessed fall This is a 86y Female with PMH of breast cancer (s/p mastectomy in 2018, radiation in 2019), recent subsegmental PE (in 10/2021, on Eliquis) admitted after unwitnessed fall likely mechanical in nature, now with R hip fx, pending OR likely tomorrow. Cardiology consulted for preop risk stratification and trop elevation.     #Preop   Pt with hx of recent subsegmental PE ( unclear if provoked). Pt is able to perform all ADL's. She walks with a walker. 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 She can walk a couple of blocks without any chest pain/ SOB/ DRIVER. She never has to stop due to any symptoms. She is able to cook for herself/ bathe herself and take care of her ADL's without any issues. She denies any dizziness/ lightheadedness/ syncope. Per pt, she was sitting on the couch. She was reading a book and then ended up falling. She did not have any energy to get up after and then was found by her caretaker prior to ED arrival. She denies loss of consciousness.   - EKG on admission NSR with PAC's, Q waves V2-V5 and non-specific ST changes ( similar to prior EKG from last admission). QTc by Janelle is 536 msec. Avoid QTc prolonging agents   - Echo on previous admission with normal LVEF, no pHTN, no significant valvular disease   - RCRI Class I risk 3.9% 30 day risk of death, MI or cardiac arrest, Young 0.4%BETH  - 4 METs, no anginal equivalents  - Pt is intermediate risk for intermediate risk surgery   - Primary team to get Pulm clearance given recent PE    # Trop elevation   Pt found on the floor as she could not get up after fall. Trop elevation likely from rhabdo  - CK, CKMB and trop downtrended after fluids  - No ischemic changes on EKG. Denies any chest pain on rest or exertion.     #pro BNP elevation   Pt is currently not overloaded on exam. No history of heart failure. Does not need diuretics at this time.     Recommendations final once signed by attending  This is a 86y Female with PMH of breast cancer (s/p mastectomy in 2018, radiation in 2019), recent subsegmental PE (in 10/2021, on Eliquis) admitted after unwitnessed fall likely mechanical in nature, now with R hip fx, pending OR likely tomorrow. Cardiology consulted for preop risk stratification and trop elevation.     #Preop   Pt with hx of recent subsegmental PE ( unclear if provoked). Pt is able to perform all ADL's. She walks with a walker. 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 She can walk a couple of blocks without any chest pain/ SOB/ DRIVER. She never has to stop due to any symptoms. She is able to cook for herself/ bathe herself and take care of her ADL's without any issues. She denies any dizziness/ lightheadedness/ syncope. Per pt, she was sitting on the couch. She was reading a book and then ended up falling. She did not have any energy to get up after and then was found by her caretaker prior to ED arrival. She denies loss of consciousness.   - EKG on admission NSR with PAC's, Q waves V2-V5 and non-specific ST changes ( similar to prior EKG from last admission). QTc by Janelle is 536 msec. Avoid QTc prolonging agents   - Echo on previous admission with normal LVEF, no pHTN, no significant valvular disease   - RCRI Class I risk 3.9% 30 day risk of death, MI or cardiac arrest, Young 0.4%BETH  - 4 METs, no anginal equivalents  - Pt is intermediate risk for intermediate risk surgery   - Primary team to get Pulm clearance given recent PE    # Trop elevation   Pt found on the floor as she could not get up after fall. Trop elevation likely demand ischemia from rhabdo  - CK, CKMB and trop downtrended after fluids. Trop 0.38>0.27  - No ischemic changes on EKG. Denies any chest pain on rest or exertion.     #pro BNP elevation   Pt is currently not overloaded on exam. No history of heart failure. Does not need diuretics at this time.     Recommendations final once signed by attending

## 2021-11-07 NOTE — PROGRESS NOTE ADULT - PROBLEM SELECTOR PLAN 6
I/s/o recent fall. Lactic acidosis to 2.9 and CK elevated at 2565. S/p 2L NS.  -mild CK, Lfts  - Trend CK  - maintenance fluids as above    #Troponemia: denies CP/sob. in setting of fall and rhabdo. EKG w/o ischemic changes  - cards recs   - trend trops I/s/o recent fall. Lactic acidosis to 2.9 and CK elevated at 2565. S/p 2L NS.  -mild CK, Lfts  - Trend CK - downtrending  - maintenance fluids as above    #Troponemia: denies CP/sob. in setting of fall and rhabdo. EKG w/o ischemic changes  - downtrending trops now   - no need to continue to trend  - likely demand and rhabdo AG to 20 with lactate to 2.9 and likely starvation ketosis. elevated bhb   - c/w d5/lr at 100cc/hr  - Trend lactate - downtrended  - AG has now closed  - however still acidotic bicarb 17 but improving  - may improve with diet as well  - frequent lung checks while on fluids

## 2021-11-07 NOTE — PROGRESS NOTE ADULT - SUBJECTIVE AND OBJECTIVE BOX
Ortho Note    Pt seen and examined on morning rounds. Pt comfortable without complaints, pain controlled.  Denies CP, SOB, N/V, numbness/tingling     Vital Signs Last 24 Hrs  T(C): 36.5 (11-07-21 @ 06:57), Max: 36.5 (11-07-21 @ 06:57)  T(F): 97.7 (11-07-21 @ 06:57), Max: 97.7 (11-07-21 @ 06:57)  HR: 88 (11-07-21 @ 07:51) (87 - 88)  BP: 100/64 (11-07-21 @ 07:51) (93/59 - 100/64)  BP(mean): --  RR: 17 (11-07-21 @ 07:51) (17 - 17)  SpO2: 96% (11-07-21 @ 07:51) (95% - 96%)  I&O's Summary    06 Nov 2021 08:01  -  07 Nov 2021 07:00  --------------------------------------------------------  IN: 0 mL / OUT: 300 mL / NET: -300 mL        Physical Exam:  General: Pt Alert and oriented, NAD  Pulses: 2+ dp, pt pulses, toeswwp, cap refill <3 seconds  Sensation: SILT sural/saph/sp/dp/ tibial distributions  Motor: 5/5 EHL/FHL/TA/GS firing                          9.5    11.64 )-----------( 238      ( 07 Nov 2021 02:09 )             29.1     11-06    144  |  111<H>  |  24<H>  ----------------------------<  99  3.3<L>   |  14<L>  |  0.83    Ca    8.5      06 Nov 2021 22:27  Mg     1.8     11-06    TPro  5.7<L>  /  Alb  2.8<L>  /  TBili  1.4<H>  /  DBili  x   /  AST  68<H>  /  ALT  31  /  AlkPhos  105  11-06      A/P: 86yFemale presents with R hip femoral neck fracture following mechanical fall. Added on for R hip yuri vs CRPP  -Added on   -Pain control  -pending consent  -pending pulmonary clearance, primary to obtain  -NPO  -dispo: pending OR    Johnny Ochoa, PGY-1  Ortho Pager [phone] Ortho Note    Pt seen and examined on morning rounds. Pt comfortable without complaints, pain controlled.  Denies CP, SOB, N/V, numbness/tingling     Vital Signs Last 24 Hrs  T(C): 36.5 (11-07-21 @ 06:57), Max: 36.5 (11-07-21 @ 06:57)  T(F): 97.7 (11-07-21 @ 06:57), Max: 97.7 (11-07-21 @ 06:57)  HR: 88 (11-07-21 @ 07:51) (87 - 88)  BP: 100/64 (11-07-21 @ 07:51) (93/59 - 100/64)  BP(mean): --  RR: 17 (11-07-21 @ 07:51) (17 - 17)  SpO2: 96% (11-07-21 @ 07:51) (95% - 96%)  I&O's Summary    06 Nov 2021 08:01  -  07 Nov 2021 07:00  --------------------------------------------------------  IN: 0 mL / OUT: 300 mL / NET: -300 mL        Physical Exam:  General: Pt Alert and oriented, NAD + ramirez  Pulses: 2+ dp, pt pulses, toeswwp, cap refill <3 seconds  Sensation: SILT sural/saph/sp/dp/ tibial distributions  Motor: 5/5 EHL/FHL/TA/GS firing                          9.5    11.64 )-----------( 238      ( 07 Nov 2021 02:09 )             29.1     11-06    144  |  111<H>  |  24<H>  ----------------------------<  99  3.3<L>   |  14<L>  |  0.83    Ca    8.5      06 Nov 2021 22:27  Mg     1.8     11-06    TPro  5.7<L>  /  Alb  2.8<L>  /  TBili  1.4<H>  /  DBili  x   /  AST  68<H>  /  ALT  31  /  AlkPhos  105  11-06      A/P: 86yFemale presents with R hip femoral neck fracture following mechanical fall. Added on for R hip yuri vs CRPP  -Added on   -Pain control  -pending consent  -pending pulmonary clearance, primary to obtain  -NPO  -dispo: pending OR    Johnny Ochoa, PGY-1  Ortho Pager [phone] Ortho Note    Pt seen and examined on morning rounds. Pt comfortable without complaints, pain controlled.  Denies CP, SOB, N/V, numbness/tingling     Vital Signs Last 24 Hrs  T(C): 36.5 (11-07-21 @ 06:57), Max: 36.5 (11-07-21 @ 06:57)  T(F): 97.7 (11-07-21 @ 06:57), Max: 97.7 (11-07-21 @ 06:57)  HR: 88 (11-07-21 @ 07:51) (87 - 88)  BP: 100/64 (11-07-21 @ 07:51) (93/59 - 100/64)  BP(mean): --  RR: 17 (11-07-21 @ 07:51) (17 - 17)  SpO2: 96% (11-07-21 @ 07:51) (95% - 96%)  I&O's Summary    06 Nov 2021 08:01  -  07 Nov 2021 07:00  --------------------------------------------------------  IN: 0 mL / OUT: 300 mL / NET: -300 mL        Physical Exam:  General: Pt Alert and oriented, NAD + ramirez  Pulses: 2+ dp, pt pulses, toeswwp, cap refill <3 seconds  Sensation: SILT sural/saph/sp/dp/ tibial distributions  Motor: 5/5 EHL/FHL/TA/GS firing  No pain with log roll or axial loading                          9.5    11.64 )-----------( 238      ( 07 Nov 2021 02:09 )             29.1     11-06    144  |  111<H>  |  24<H>  ----------------------------<  99  3.3<L>   |  14<L>  |  0.83    Ca    8.5      06 Nov 2021 22:27  Mg     1.8     11-06    TPro  5.7<L>  /  Alb  2.8<L>  /  TBili  1.4<H>  /  DBili  x   /  AST  68<H>  /  ALT  31  /  AlkPhos  105  11-06      A/P: 86yFemale presents with R hip pain following mechanical fall, concerning for R hip FNF, CT images reviewed - negative for FNF.   -Added on   -Pain control  -regular diet  -PT for mobilization  -WBS: WBAT with RW  -dispo: pending OR    Johnny Ochoa, PGY-1  Ortho Pager [phone]

## 2021-11-07 NOTE — DISCHARGE NOTE NURSING/CASE MANAGEMENT/SOCIAL WORK - PATIENT PORTAL LINK FT
You can access the FollowMyHealth Patient Portal offered by [facility] by registering at the following website: [email or web address]. By joining [employer]’s FollowMyHealth portal, you will also be able to view your health information using other applications (apps) compatible with our system.

## 2021-11-07 NOTE — PROGRESS NOTE ADULT - PROBLEM SELECTOR PLAN 9
Hx of breast cancer (s/p resection and axillary LN dissection). Follows with Dr. Sheridan.  - Continue to follow up as outpatient Found to have DVT/PE on last admission in 10/2021. On Eliquis 5 mg bid.  - Hold Eliquis  - pulm consult/clearance given recent DVT and PEs

## 2021-11-07 NOTE — CONSULT NOTE ADULT - ASSESSMENT
86y Female with PMH of breast cancer (s/p mastectomy in 2018, radiation in 2019), recent submassive PE (in 10/2021, on Eliquis) admitted after unwitnessed fall. Urology consulted for difficult ramirez.

## 2021-11-07 NOTE — PROGRESS NOTE ADULT - PROBLEM SELECTOR PROBLEM 6
"Subjective:       Patient ID: Wm Guerra is a 23 y.o. male.    Vitals:  height is 5' 11" (1.803 m) and weight is 72.6 kg (160 lb). His oral temperature is 98.2 °F (36.8 °C). His blood pressure is 141/69 (abnormal) and his pulse is 84. His respiration is 20 and oxygen saturation is 98%.     Chief Complaint: Sinus Problem    Patient complains of a RN, congestion, PND, ear pain, sore throat, cough, headache, and sinus pressure/pain for the past 3 days.  Patient also reports wheezing and occasional chest tightness.  Patient denies fever and chills.  Patient also denies improvement with OTC meds.  Patient denies any other complaints at this time.         Sinus Problem   This is a new problem. The current episode started in the past 7 days (x3days). The problem has been gradually worsening since onset. There has been no fever. Associated symptoms include congestion, coughing, ear pain, headaches, sinus pressure and a sore throat. Pertinent negatives include no chills, diaphoresis or shortness of breath. Past treatments include oral decongestants. The treatment provided mild relief.       Constitution: Negative for chills, sweating, fatigue and fever.   HENT: Positive for ear pain, congestion, postnasal drip, sinus pressure and sore throat. Negative for sinus pain and voice change.    Neck: Negative for painful lymph nodes.   Cardiovascular: Negative for chest pain.   Eyes: Negative for eye redness.   Respiratory: Positive for chest tightness, cough, sputum production (greenish) and wheezing. Negative for bloody sputum, COPD, shortness of breath, stridor and asthma.    Gastrointestinal: Negative for nausea and vomiting.   Musculoskeletal: Negative for muscle ache.   Skin: Negative for rash.   Allergic/Immunologic: Negative for seasonal allergies and asthma.   Neurological: Positive for headaches.   Hematologic/Lymphatic: Negative for swollen lymph nodes.       Objective:      Physical Exam   Constitutional: He is " oriented to person, place, and time. He appears well-developed and well-nourished. No distress.   HENT:   Head: Normocephalic and atraumatic.   Right Ear: External ear and ear canal normal. Tympanic membrane is erythematous. A middle ear effusion (purulent) is present.   Left Ear: External ear and ear canal normal. Tympanic membrane is erythematous.  No middle ear effusion.   Nose: Mucosal edema and rhinorrhea present. Right sinus exhibits no maxillary sinus tenderness and no frontal sinus tenderness. Left sinus exhibits no maxillary sinus tenderness and no frontal sinus tenderness.   Mouth/Throat: Uvula is midline and mucous membranes are normal. Posterior oropharyngeal erythema present. No tonsillar exudate.   Eyes: Conjunctivae, EOM and lids are normal. Pupils are equal, round, and reactive to light.   Neck: Normal range of motion. Neck supple.   Cardiovascular: Normal rate, regular rhythm and normal heart sounds.   Pulmonary/Chest: Effort normal. No respiratory distress. He has wheezes (Scattered faint expiratory wheezes noted).   Abdominal: Soft. Normal appearance and bowel sounds are normal. He exhibits no distension and no mass. There is no tenderness.   Musculoskeletal: Normal range of motion.   Neurological: He is alert and oriented to person, place, and time. He has normal strength. No cranial nerve deficit or sensory deficit.   Skin: Skin is warm. Capillary refill takes less than 2 seconds.   Psychiatric: He has a normal mood and affect. His speech is normal and behavior is normal. Judgment and thought content normal. Cognition and memory are normal.   Nursing note and vitals reviewed.      Assessment:       1. Acute suppurative otitis media of right ear without spontaneous rupture of tympanic membrane, recurrence not specified    2. Bronchitis        Plan:         Acute suppurative otitis media of right ear without spontaneous rupture of tympanic membrane, recurrence not specified  -      amoxicillin-clavulanate 875-125mg (AUGMENTIN) 875-125 mg per tablet; Take 1 tablet by mouth 2 (two) times daily. for 10 days  Dispense: 20 tablet; Refill: 0  -     predniSONE (DELTASONE) 20 MG tablet; Take 1 tablet (20 mg total) by mouth 2 (two) times daily. for 4 days  Dispense: 8 tablet; Refill: 0    Bronchitis  -     predniSONE (DELTASONE) 20 MG tablet; Take 1 tablet (20 mg total) by mouth 2 (two) times daily. for 4 days  Dispense: 8 tablet; Refill: 0  -     albuterol (PROVENTIL/VENTOLIN HFA) 90 mcg/actuation inhaler; Inhale 2 puffs into the lungs every 6 (six) hours as needed for Wheezing or Shortness of Breath. Rescue  Dispense: 1 Inhaler; Refill: 0  -     brompheniramine-pseudoeph-DM (BROMFED DM) 2-30-10 mg/5 mL Syrp; Take 10 mLs by mouth every 6 (six) hours as needed.  Dispense: 120 mL; Refill: 0      Patient Instructions     1.  Take all medications as directed. If you have been prescribed antibiotics, make sure to complete them.   2.  Rest and keep yourself/patient well hydrated. For adults, it is recommended to drink at least 8-10 glasses of water daily.   3.  For patients above 6 months of age who are not allergic to and are not on anticoagulants, you can alternate Tylenol and Motrin every 4-6 hours for fever above 100.4F and/or pain.  For patients less than 6 months of age, allergic to or intolerant to NSAIDS, have gastritis, gastric ulcers, or history of GI bleeds, are pregnant, or are on anticoagulant therapy, you can take Tylenol every 4 hours as needed for fever above 100.4F and/or pain.   4. You should schedule a follow-up appointment with your Primary Care Provider/Pediatrician for recheck in 2-3 days or as directed at this visit.   5.  If your condition fails to improve in a timely manner, you should receive another evaluation by your Primary Care Provider/Pediatrician to discuss your concerns or return to urgent care for a recheck.  If your condition worsens at any time, you should report  immediately to your nearest Emergency Department for further evaluation. **You must understand that you have received Urgent Care treatment only and that you may be released before all of your medical problems are known or treated. You, the patient, are responsible to arrange for follow-up care as instructed.         Middle Ear Infection (Adult)  You have an infection of the middle ear, the space behind the eardrum. This is also called acute otitis media (AOM). Sometimes it is caused by the common cold. This is because congestion can block the internal passage (eustachian tube) that drains fluid from the middle ear. When the middle ear fills with fluid, bacteria can grow there and cause an infection. Oral antibiotics are used to treat this illness, not ear drops. Symptoms usually start to improve within 1 to 2 days of treatment.    Home care  The following are general care guidelines:  · Finish all of the antibiotic medicine given, even though you may feel better after the first few days.  · You may use over-the-counter medicine, such as acetaminophen or ibuprofen, to control pain and fever, unless something else was prescribed. If you have chronic liver or kidney disease or have ever had a stomach ulcer or gastrointestinal bleeding, talk with your healthcare provider before using these medicines. Do not give aspirin to anyone under 18 years of age who has a fever. It may cause severe illness or death.  Follow-up care  Follow up with your healthcare provider, or as advised, in 2 weeks if all symptoms have not gotten better, or if hearing doesn't go back to normal within 1 month.  When to seek medical advice  Call your healthcare provider right away if any of these occur:  · Ear pain gets worse or does not improve after 3 days of treatment  · Unusual drowsiness or confusion  · Neck pain, stiff neck, or headache  · Fluid or blood draining from the ear canal  · Fever of 100.4°F (38°C) or as advised   · Seizure  Date  Last Reviewed: 6/1/2016  © [id number] [employer]. [address] [place] [address], [place] [address]. All rights reserved. This information is not intended as a substitute for professional medical care. Always follow your healthcare professional's instructions.      What Is Acute Bronchitis?  Acute bronchitis is when the airways in your lungs (bronchial tubes) become red and swollen (inflamed). It is usually caused by a viral infection. But it can also occur because of a bacteria or allergen. Symptoms include a cough that produces yellow or greenish mucus and can last for days or sometimes weeks.  Inside healthy lungs    Air travels in and out of the lungs through the airways. The linings of these airways produce sticky mucus. This mucus traps particles that enter the lungs. Tiny structures called cilia then sweep the particles out of the airways.     Healthy airway: Airways are normally open. Air moves in and out easily.      Healthy cilia: Tiny, hairlike cilia sweep mucus and particles up and out of the airways.   Lungs with bronchitis  Bronchitis often occurs with a cold or the flu virus. The airways become inflamed (red and swollen). There is a deep hacking cough from the extra mucus. Other symptoms may include:  · Wheezing  · Chest discomfort  · Shortness of breath  · Mild fever  A second infection, this time due to bacteria, may then occur. And airways irritated by allergens or smoke are more likely to get infected.        Inflamed airway: Inflammation and extra mucus narrow the airway, causing shortness of breath.      Impaired cilia: Extra mucus impairs cilia, causing congestion and wheezing. Smoking makes the problem worse.   Making a diagnosis  A physical exam, health history, and certain tests help your healthcare provider make the diagnosis.  Health history  Your healthcare provider will ask you about your symptoms.  The exam  Your provider listens to your chest for signs of congestion. He or she  may also check your ears, nose, and throat.  Possible tests  · A sputum test for bacteria. This requires a sample of mucus from your lungs.  · A nasal or throat swab. This tests to see if you have a bacterial infection.  · A chest X-ray. This is done if your healthcare provider thinks you have pneumonia.  · Tests to check for an underlying condition. Other tests may be done to check for things such as allergies, asthma, or COPD (chronic obstructive pulmonary disease). You may need to see a specialist for more lung function testing.  Treating a cough  The main treatment for bronchitis is easing symptoms. Avoiding smoke, allergens, and other things that trigger coughing can often help. If the infection is bacterial, you may be given antibiotics. During the illness, it's important to get plenty of sleep. To ease symptoms:  · Dont smoke. Also avoid secondhand smoke.  · Use a humidifier. Or try breathing in steam from a hot shower. This may help loosen mucus.  · Drink a lot of water and juice. They can soothe the throat and may help thin mucus.  · Sit up or use extra pillows when in bed. This helps to lessen coughing and congestion.  · Ask your provider about using medicine. Ask about using cough medicine, pain and fever medicine, or a decongestant.  Antibiotics  Most cases of bronchitis are caused by cold or flu viruses. They dont need antibiotics to treat them, even if your mucus is thick and green or yellow. Antibiotics dont treat viral illness and antibiotics have not been shown to have any benefit in cases of acute bronchitis. Taking antibiotics when they are not needed increases your risk of getting an infection later that is antibiotic-resistant. Antibiotics can also cause severe cases of diarrhea that require other antibiotics to treat.  It is important that you accept your healthcare provider's opinion to not use antibiotics. Your provider will prescribe antibiotics if the infection is caused by bacteria. If  they are prescribed:  · Take all of the medicine. Take the medicine until it is used up, even if symptoms have improved. If you dont, the bronchitis may come back.  · Take the medicines as directed. For instance, some medicines should be taken with food.  · Ask about side effects. Ask your provider or pharmacist what side effects are common, and what to do about them.  Follow-up care  You should see your provider again in 2 to 3 weeks. By this time, symptoms should have improved. An infection that lasts longer may mean you have a more serious problem.  Prevention  · Avoid tobacco smoke. If you smoke, quit. Stay away from smoky places. Ask friends and family not to smoke around you, or in your home or car.  · Get checked for allergies.  · Ask your provider about getting a yearly flu shot. Also ask about pneumococcal or pneumonia shots.  · Wash your hands often. This helps reduce the chance of picking up viruses that cause colds and flu.  Call your healthcare provider if:  · Symptoms worsen, or you have new symptoms  · Breathing problems worsen or  become severe  · Symptoms dont get better within a week, or within 3 days of taking antibiotics   Date Last Reviewed: 2/1/2017  © 3759-7244 [employer]. [address] [place] [address], [place] [address]. All rights reserved. This information is not intended as a substitute for professional medical care. Always follow your healthcare professional's instructions.                  Rhabdomyolysis Lactic acidosis

## 2021-11-07 NOTE — PROGRESS NOTE ADULT - PROBLEM SELECTOR PLAN 5
AG to 20 with lactate to 2.9 and likely starvation ketosis. elevated bhb   - start d5/lr   - Trend lactate  - Trend AG AG to 20 with lactate to 2.9 and likely starvation ketosis. elevated bhb   - start d5/lr at 100cc/hr  - Trend lactate - downtrended  - Trend AG - wnl Afebrile, hemodynamically stable. No cough, h/a, or urinary symptoms. Now has leukocytosis and bandemia on labs. CXR unremarkable. CXR w/o infiltrate. Treat for UTI as above  - F/u BCx  - UA - + for nitrites and bacteria, - for leuk esterases and wbc  - f/u UCx  - denies dysuria however did have urinary retention on bladder scan - 300 UOP after ramirez placement by Urology

## 2021-11-07 NOTE — PROGRESS NOTE ADULT - PROBLEM SELECTOR PLAN 2
I/s/o fall. Bruising around R hip, as well as R orbit, R shoulder, and b/l knees. Evidence of valgus impacted femoral neck fracture on R hip X-ray according to ortho however CT and x-ray are negative as per radiology. Reports no pain currently. Plan is for R hip pinning vs. hemiarthroplasty when medically cleared as per ortho.   - f/u ortho recs now that CT is saying no fracture  - not currently optimized. f/up fall and infectious workup  - cards gave recommendations regarding surgical risk - will need pulm assessment if to go to OR. intermediate risk for intermediate risk surgery  - Preop labs (CBC, BMP, coags, T&S, UA, ECG, CXR, COVID) - obtained  - NPO/no IVF past midnight when medically cleared for surgery  - Tylenol PRN for pain however denying pain Pt has bandemia and leukocytosis. She is afebrile, no tachycardia or tachypnea however she did have a bp of 93/59 at one point. Pt also has a starvation ketosis and lactic acidosis.   - UA has bacteria and nitrites, no leuks <5WBC    PLAN:  - treat for sepsis 2/2 UTI  - CTX 1g qd  - s/p 2L IVF  - now on maintenance D5LR  - repeat CBC and CMP at 2pm to monitor Gap and acidosis

## 2021-11-07 NOTE — CONSULT NOTE ADULT - ATTENDING COMMENTS
Pt is a 85 y/o c breast cancer, recent subsegmental PE on Eliquis, admitted for unwitnessed fall now c hip fracture.     CK 2518 --> 1194  Ivan 0.37 --> 018    Agree that pt intermediate risk for intermediate risk  Agree that Tn elevation is demand ischemia  F/u post op

## 2021-11-07 NOTE — CONSULT NOTE ADULT - PROBLEM SELECTOR RECOMMENDATION 9
- no emergent urological intervention indicated at this time   - 12fr coude placed with yellow urine output   - would obtain UA and UCX and treat as per primary team  - TOV as per primary team when patient is ambulating and with bowel function   - if patient fails TOV and primary team can replace ramirez. patient can follow up at  clinic [address] [place] [address] [address] [phone]. can also TOV at [facility] if patient qualifies on discharge.   - discussed with  team

## 2021-11-07 NOTE — PROGRESS NOTE ADULT - PROBLEM SELECTOR PLAN 7
Bladder scan showed >300cc in bladder. urology consulted for difficult straight cath. ramirez was placed by urology.  - UA - + for nitrites and bacteria, - for leuk esterases and wbc  - denies dysuria however did have urinary retention on bladder scan - 300 UOP after ramirez placement by Urology  - f/u Ucx I/s/o recent fall. Lactic acidosis to 2.9 and CK elevated at 2565. S/p 2L NS.  -mild CK, Lfts  - Trend CK - downtrending  - maintenance fluids as above    #Troponemia: denies CP/sob. in setting of fall and rhabdo. EKG w/o ischemic changes  - downtrending trops now   - no need to continue to trend  - likely demand and rhabdo

## 2021-11-07 NOTE — PROGRESS NOTE ADULT - PROBLEM SELECTOR PLAN 11
F: s/p 2L NS, now on D5LR at 100cc/hr  E: Replete K<4, Mg<2  N: Regular diet; make NPO prior to orthopedic surgery  VTE Ppx: holding Eliquis i/s/o orthopedic surgery, will start heparin if Hgb stable.  GI: not needed  C: Full Code  D: RMF for orthopedic surgery F: s/p 2L NS, now on D5LR at 100cc/hr  E: Replete K<4, Mg<2  N: Regular diet; make NPO prior to orthopedic surgery  VTE Ppx: holding Eliquis i/s/o orthopedic surgery, SCDs for now, will start heparin if Hgb stable.  GI: not needed  C: Full Code  D: RMF for orthopedic surgery Bilirubin to 1.8 on labs, up from 0.7 during last admission. ALP is upper limit of normal.  - Trend CMP

## 2021-11-07 NOTE — PROGRESS NOTE ADULT - PROBLEM SELECTOR PLAN 10
Bilirubin to 1.8 on labs, up from 0.7 during last admission. ALP is upper limit of normal.  - Trend CMP Hx of breast cancer (s/p resection and axillary LN dissection). Follows with Dr. Sheridan.  - Continue to follow up as outpatient

## 2021-11-07 NOTE — PROGRESS NOTE ADULT - PROBLEM SELECTOR PLAN 3
Pt came in with a Hgb of 12.4 (baseline hgb around 10-10.5). Overnight CBC Hgb was 9.5. AM CBC hgb 9.0. No signs of blood loss/pooling in pelvis on CT. Likely concentrated on admission and repeat 9.5 was dilutional after receiving 2L of IVF. However will continue to monitor closely.  - vitals have been stable this AM    PLAN:  - monitor CBC  - monitor HR, BP closely  - f/u with ortho regarding anticoagulation and possible OR status  - maintain active t&s CT shows increased stool burden. No impaction or obstruction. May be cause of urinary retention and UTI    PLAN:  - will start bowel regimen - senna miralax

## 2021-11-07 NOTE — PROGRESS NOTE ADULT - ASSESSMENT
86y Female with PMH of breast cancer (s/p mastectomy in 2018, radiation in 2019) and recent submassive PE (in 10/2021, on Eliquis) admitted after unwitnessed fall. Found to have questionable R hip fracture. CT and Xray are negative for fracture however Ortho is still discussing whether there is a fracture present.

## 2021-11-07 NOTE — PROCEDURE NOTE - ADDITIONAL PROCEDURE DETAILS
12fr coude placed  not a formal urology consult   TOV as per primary team when patient is ambulating and with bowel function

## 2021-11-07 NOTE — PROGRESS NOTE ADULT - PROBLEM SELECTOR PLAN 4
Afebrile, hemodynamically stable. No cough, h/a, or urinary symptoms. Now has leukocytosis and bandemia on labs. CXR unremarkable. CXR w/o infiltrate  - F/u BCx  - UA - + for nitrites and bacteria, - for leuk esterases and wbc  - f/u UCx  - denies dysuria however did have urinary retention on bladder scan - 300 UOP after ramirez placement by Urology Pt came in with a Hgb of 12.4 (baseline hgb around 10-10.5). Overnight CBC Hgb was 9.5. AM CBC hgb 9.0. No signs of blood loss/pooling in pelvis on CT. Likely concentrated on admission and repeat 9.5 was dilutional after receiving 2L of IVF. However will continue to monitor closely.  - vitals have been stable this AM    PLAN:  - monitor CBC - repeat at 2pm - if stable will restart eliquis  - monitor HR, BP closely  - maintain active t&s

## 2021-11-07 NOTE — PROGRESS NOTE ADULT - PROBLEM SELECTOR PLAN 1
Unwitnessed fall at home likely yesterday morning. Denies preceding lightheadedness, dizziness, or palpitations but memory of event is limited but denies LOC. CTH and C-spine without bleed or fractures.    - F/u ECG  - cardiac enzymes peaked and now downtrending - likely due to rhabdo and demand ischemia from being down  - pro-BNP elevated however euvolemic to hypovolemic on exam  - TSH - wnl  - F/u B12/folate  - Orthostatics when able to stand Unwitnessed fall at home likely yesterday morning. Denies preceding lightheadedness, dizziness, or palpitations but memory of event is limited but denies LOC. CTH and C-spine without bleed or fractures.    - F/u ECG  - cardiac enzymes peaked and now downtrending - likely due to rhabdo and demand ischemia from being down  - pro-BNP elevated however euvolemic to hypovolemic on exam  - TSH - wnl  - F/u B12/folate  - Orthostatics when able to stand  - CT abd/pelvis confirmed no hip fracture just soft tissue injury

## 2021-11-08 ENCOUNTER — TRANSCRIPTION ENCOUNTER (OUTPATIENT)
Age: 86
End: 2021-11-08

## 2021-11-08 LAB
ALBUMIN SERPL ELPH-MCNC: 2.5 G/DL — LOW (ref 3.3–5)
ALBUMIN SERPL ELPH-MCNC: 2.6 G/DL — LOW (ref 3.3–5)
ALP SERPL-CCNC: 100 U/L — SIGNIFICANT CHANGE UP (ref 40–120)
ALP SERPL-CCNC: 118 U/L — SIGNIFICANT CHANGE UP (ref 40–120)
ALT FLD-CCNC: 29 U/L — SIGNIFICANT CHANGE UP (ref 10–45)
ALT FLD-CCNC: 34 U/L — SIGNIFICANT CHANGE UP (ref 10–45)
ANION GAP SERPL CALC-SCNC: 10 MMOL/L — SIGNIFICANT CHANGE UP (ref 5–17)
ANION GAP SERPL CALC-SCNC: 7 MMOL/L — SIGNIFICANT CHANGE UP (ref 5–17)
ANISOCYTOSIS BLD QL: SLIGHT — SIGNIFICANT CHANGE UP
AST SERPL-CCNC: 39 U/L — SIGNIFICANT CHANGE UP (ref 10–40)
AST SERPL-CCNC: 50 U/L — HIGH (ref 10–40)
BASOPHILS # BLD AUTO: 0 K/UL — SIGNIFICANT CHANGE UP (ref 0–0.2)
BASOPHILS # BLD AUTO: 0.01 K/UL — SIGNIFICANT CHANGE UP (ref 0–0.2)
BASOPHILS NFR BLD AUTO: 0 % — SIGNIFICANT CHANGE UP (ref 0–2)
BASOPHILS NFR BLD AUTO: 0.1 % — SIGNIFICANT CHANGE UP (ref 0–2)
BILIRUB SERPL-MCNC: 0.8 MG/DL — SIGNIFICANT CHANGE UP (ref 0.2–1.2)
BILIRUB SERPL-MCNC: 0.9 MG/DL — SIGNIFICANT CHANGE UP (ref 0.2–1.2)
BUN SERPL-MCNC: 27 MG/DL — HIGH (ref 7–23)
BUN SERPL-MCNC: 31 MG/DL — HIGH (ref 7–23)
BURR CELLS BLD QL SMEAR: PRESENT — SIGNIFICANT CHANGE UP
CALCIUM SERPL-MCNC: 8.3 MG/DL — LOW (ref 8.4–10.5)
CALCIUM SERPL-MCNC: 8.3 MG/DL — LOW (ref 8.4–10.5)
CHLORIDE SERPL-SCNC: 109 MMOL/L — HIGH (ref 96–108)
CHLORIDE SERPL-SCNC: 116 MMOL/L — HIGH (ref 96–108)
CK SERPL-CCNC: 484 U/L — HIGH (ref 25–170)
CO2 SERPL-SCNC: 16 MMOL/L — LOW (ref 22–31)
CO2 SERPL-SCNC: 21 MMOL/L — LOW (ref 22–31)
CREAT SERPL-MCNC: 0.66 MG/DL — SIGNIFICANT CHANGE UP (ref 0.5–1.3)
CREAT SERPL-MCNC: 0.68 MG/DL — SIGNIFICANT CHANGE UP (ref 0.5–1.3)
CULTURE RESULTS: SIGNIFICANT CHANGE UP
EOSINOPHIL # BLD AUTO: 0 K/UL — SIGNIFICANT CHANGE UP (ref 0–0.5)
EOSINOPHIL # BLD AUTO: 0 K/UL — SIGNIFICANT CHANGE UP (ref 0–0.5)
EOSINOPHIL NFR BLD AUTO: 0 % — SIGNIFICANT CHANGE UP (ref 0–6)
EOSINOPHIL NFR BLD AUTO: 0 % — SIGNIFICANT CHANGE UP (ref 0–6)
GIANT PLATELETS BLD QL SMEAR: PRESENT — SIGNIFICANT CHANGE UP
GLUCOSE BLDC GLUCOMTR-MCNC: 101 MG/DL — HIGH (ref 70–99)
GLUCOSE BLDC GLUCOMTR-MCNC: 104 MG/DL — HIGH (ref 70–99)
GLUCOSE BLDC GLUCOMTR-MCNC: 131 MG/DL — HIGH (ref 70–99)
GLUCOSE BLDC GLUCOMTR-MCNC: 139 MG/DL — HIGH (ref 70–99)
GLUCOSE SERPL-MCNC: 131 MG/DL — HIGH (ref 70–99)
GLUCOSE SERPL-MCNC: 93 MG/DL — SIGNIFICANT CHANGE UP (ref 70–99)
HAPTOGLOB SERPL-MCNC: 128 MG/DL — SIGNIFICANT CHANGE UP (ref 34–200)
HCT VFR BLD CALC: 26.8 % — LOW (ref 34.5–45)
HCT VFR BLD CALC: 30.4 % — LOW (ref 34.5–45)
HGB BLD-MCNC: 8.6 G/DL — LOW (ref 11.5–15.5)
HGB BLD-MCNC: 9.1 G/DL — LOW (ref 11.5–15.5)
IMM GRANULOCYTES NFR BLD AUTO: 1.8 % — HIGH (ref 0–1.5)
LDH SERPL L TO P-CCNC: 429 U/L — HIGH (ref 50–242)
LYMPHOCYTES # BLD AUTO: 0.65 K/UL — LOW (ref 1–3.3)
LYMPHOCYTES # BLD AUTO: 0.93 K/UL — LOW (ref 1–3.3)
LYMPHOCYTES # BLD AUTO: 5.6 % — LOW (ref 13–44)
LYMPHOCYTES # BLD AUTO: 8.9 % — LOW (ref 13–44)
MACROCYTES BLD QL: SLIGHT — SIGNIFICANT CHANGE UP
MAGNESIUM SERPL-MCNC: 2.1 MG/DL — SIGNIFICANT CHANGE UP (ref 1.6–2.6)
MAGNESIUM SERPL-MCNC: 2.2 MG/DL — SIGNIFICANT CHANGE UP (ref 1.6–2.6)
MANUAL SMEAR VERIFICATION: SIGNIFICANT CHANGE UP
MCHC RBC-ENTMCNC: 27.1 PG — SIGNIFICANT CHANGE UP (ref 27–34)
MCHC RBC-ENTMCNC: 27.2 PG — SIGNIFICANT CHANGE UP (ref 27–34)
MCHC RBC-ENTMCNC: 29.9 GM/DL — LOW (ref 32–36)
MCHC RBC-ENTMCNC: 32.1 GM/DL — SIGNIFICANT CHANGE UP (ref 32–36)
MCV RBC AUTO: 84.5 FL — SIGNIFICANT CHANGE UP (ref 80–100)
MCV RBC AUTO: 90.7 FL — SIGNIFICANT CHANGE UP (ref 80–100)
MICROCYTES BLD QL: SLIGHT — SIGNIFICANT CHANGE UP
MONOCYTES # BLD AUTO: 0.09 K/UL — SIGNIFICANT CHANGE UP (ref 0–0.9)
MONOCYTES # BLD AUTO: 0.36 K/UL — SIGNIFICANT CHANGE UP (ref 0–0.9)
MONOCYTES NFR BLD AUTO: 0.9 % — LOW (ref 2–14)
MONOCYTES NFR BLD AUTO: 3.1 % — SIGNIFICANT CHANGE UP (ref 2–14)
NEUTROPHILS # BLD AUTO: 10.28 K/UL — HIGH (ref 1.8–7.4)
NEUTROPHILS # BLD AUTO: 9.39 K/UL — HIGH (ref 1.8–7.4)
NEUTROPHILS NFR BLD AUTO: 89.4 % — HIGH (ref 43–77)
NEUTROPHILS NFR BLD AUTO: 90.2 % — HIGH (ref 43–77)
NRBC # BLD: 0 /100 WBCS — SIGNIFICANT CHANGE UP (ref 0–0)
OVALOCYTES BLD QL SMEAR: SLIGHT — SIGNIFICANT CHANGE UP
PHOSPHATE SERPL-MCNC: 1.2 MG/DL — LOW (ref 2.5–4.5)
PHOSPHATE SERPL-MCNC: 3.3 MG/DL — SIGNIFICANT CHANGE UP (ref 2.5–4.5)
PLAT MORPH BLD: ABNORMAL
PLATELET # BLD AUTO: 229 K/UL — SIGNIFICANT CHANGE UP (ref 150–400)
PLATELET # BLD AUTO: 251 K/UL — SIGNIFICANT CHANGE UP (ref 150–400)
POIKILOCYTOSIS BLD QL AUTO: SLIGHT — SIGNIFICANT CHANGE UP
POLYCHROMASIA BLD QL SMEAR: SLIGHT — SIGNIFICANT CHANGE UP
POTASSIUM SERPL-MCNC: 4 MMOL/L — SIGNIFICANT CHANGE UP (ref 3.5–5.3)
POTASSIUM SERPL-MCNC: 4.2 MMOL/L — SIGNIFICANT CHANGE UP (ref 3.5–5.3)
POTASSIUM SERPL-SCNC: 4 MMOL/L — SIGNIFICANT CHANGE UP (ref 3.5–5.3)
POTASSIUM SERPL-SCNC: 4.2 MMOL/L — SIGNIFICANT CHANGE UP (ref 3.5–5.3)
PROT SERPL-MCNC: 5 G/DL — LOW (ref 6–8.3)
PROT SERPL-MCNC: 5.8 G/DL — LOW (ref 6–8.3)
RBC # BLD: 3.17 M/UL — LOW (ref 3.8–5.2)
RBC # BLD: 3.35 M/UL — LOW (ref 3.8–5.2)
RBC # FLD: 19.2 % — HIGH (ref 10.3–14.5)
RBC # FLD: 19.6 % — HIGH (ref 10.3–14.5)
RBC BLD AUTO: ABNORMAL
RETICS #: 38.6 K/UL — SIGNIFICANT CHANGE UP (ref 25–125)
RETICS/RBC NFR: 1.2 % — SIGNIFICANT CHANGE UP (ref 0.5–2.5)
SCHISTOCYTES BLD QL AUTO: SLIGHT — SIGNIFICANT CHANGE UP
SMUDGE CELLS # BLD: PRESENT — SIGNIFICANT CHANGE UP
SODIUM SERPL-SCNC: 135 MMOL/L — SIGNIFICANT CHANGE UP (ref 135–145)
SODIUM SERPL-SCNC: 144 MMOL/L — SIGNIFICANT CHANGE UP (ref 135–145)
SPECIMEN SOURCE: SIGNIFICANT CHANGE UP
SPHEROCYTES BLD QL SMEAR: SLIGHT — SIGNIFICANT CHANGE UP
WBC # BLD: 10.41 K/UL — SIGNIFICANT CHANGE UP (ref 3.8–10.5)
WBC # BLD: 11.51 K/UL — HIGH (ref 3.8–10.5)
WBC # FLD AUTO: 10.41 K/UL — SIGNIFICANT CHANGE UP (ref 3.8–10.5)
WBC # FLD AUTO: 11.51 K/UL — HIGH (ref 3.8–10.5)

## 2021-11-08 PROCEDURE — 99233 SBSQ HOSP IP/OBS HIGH 50: CPT | Mod: GC

## 2021-11-08 PROCEDURE — 99233 SBSQ HOSP IP/OBS HIGH 50: CPT

## 2021-11-08 PROCEDURE — 93010 ELECTROCARDIOGRAM REPORT: CPT

## 2021-11-08 RX ORDER — POLYETHYLENE GLYCOL 3350 17 G/17G
17 POWDER, FOR SOLUTION ORAL EVERY 12 HOURS
Refills: 0 | Status: DISCONTINUED | OUTPATIENT
Start: 2021-11-08 | End: 2021-11-10

## 2021-11-08 RX ORDER — SODIUM,POTASSIUM PHOSPHATES 278-250MG
1 POWDER IN PACKET (EA) ORAL ONCE
Refills: 0 | Status: COMPLETED | OUTPATIENT
Start: 2021-11-08 | End: 2021-11-08

## 2021-11-08 RX ADMIN — CEFTRIAXONE 100 MILLIGRAM(S): 500 INJECTION, POWDER, FOR SOLUTION INTRAMUSCULAR; INTRAVENOUS at 11:27

## 2021-11-08 RX ADMIN — Medication 1 PACKET(S): at 08:53

## 2021-11-08 RX ADMIN — APIXABAN 5 MILLIGRAM(S): 2.5 TABLET, FILM COATED ORAL at 17:03

## 2021-11-08 RX ADMIN — APIXABAN 5 MILLIGRAM(S): 2.5 TABLET, FILM COATED ORAL at 06:17

## 2021-11-08 RX ADMIN — Medication 1 MILLIGRAM(S): at 11:28

## 2021-11-08 RX ADMIN — Medication 85 MILLIMOLE(S): at 09:12

## 2021-11-08 RX ADMIN — Medication 1 TABLET(S): at 11:28

## 2021-11-08 NOTE — DIETITIAN INITIAL EVALUATION ADULT. - PROBLEM SELECTOR PLAN 1
Unwitnessed fall at home likely yesterday morning. Denies preceding lightheadedness, dizziness, or palpitations but memory of event is limited but denies LOC. CTH and C-spine without bleed or fractures.    - F/u ECG  - Trend cardiac enzymes  - F/u pro-BNP  - F/u TSH  - F/u B12/folate  - Orthostatics

## 2021-11-08 NOTE — DISCHARGE NOTE PROVIDER - NSDCFUSCHEDAPPT_GEN_ALL_CORE_FT
ABRAHAM CHANDLER ; 11/16/2021 ; [facility] [address] ABRAHAM CHANDLER ; 11/16/2021 ; [facility] [address]  ABRAHAM CHANDLER ; 11/19/2021 ; [facility] [address]

## 2021-11-08 NOTE — DIETITIAN INITIAL EVALUATION ADULT. - ORAL INTAKE PTA/DIET HISTORY
Difficult to quantify diet PTA.Per patient she has been eating less recently due to lack of appetite.She eats all types of foods, but generally does not cook.Friend in building reportedly helps purchase food.

## 2021-11-08 NOTE — OCCUPATIONAL THERAPY INITIAL EVALUATION ADULT - LEVEL OF INDEPENDENCE:TOILET, OT EVAL
pt with active BM in hallway during functional ambulation. Pt did not request to use the bathroom/did not recognize she was going. Pt required max A to clean herself after completing BM on toilet/maximum assist (25% patients effort)

## 2021-11-08 NOTE — DIETITIAN INITIAL EVALUATION ADULT. - PROBLEM SELECTOR PLAN 9
F: s/p 2L NS  E: Replete K<4, Mg<2  N: Regular diet; make NPO prior to orthopedic surgery  VTE Ppx: holding Eliquis i/s/o orthopedic surgery  GI: not needed  C: Full Code  D: RMF for orthopedic surgery

## 2021-11-08 NOTE — PROGRESS NOTE ADULT - ASSESSMENT
Assessment/Plan:     86y Female with PMH of breast cancer (s/p mastectomy in 2018, radiation in 2019), recent subsegmental PE (in 10/2021, on Eliquis) admitted after unwitnessed fall likely mechanical in nature, now with R hip fx, pending OR . Cardiology consulted for preop risk stratification and trop elevation.     -Per Ortho, no plan for OR  -No active cardiac issues  -Pls reconsult as needed

## 2021-11-08 NOTE — PHYSICAL THERAPY INITIAL EVALUATION ADULT - IMPAIRMENTS FOUND, PT EVAL
aerobic capacity/endurance/cognitive impairment/ergonomics and body mechanics/gait, locomotion, and balance/muscle strength/poor safety awareness/posture

## 2021-11-08 NOTE — OCCUPATIONAL THERAPY INITIAL EVALUATION ADULT - TOILET TRAINING, ASSISTIVE DEVICE, OT EVAL
when asked how pt typically gets off the toilet at home she stated that she uses two knob on her cabinets to help lift her off the toilet. Educated pt on safety awareness/commode/rolling walker

## 2021-11-08 NOTE — DIETITIAN INITIAL EVALUATION ADULT. - OTHER INFO
86y Female with PMH of breast cancer (s/p mastectomy in 2018, radiation in 2019) and recent submassive PE (in 10/2021, on Eliquis) admitted after unwitnessed fall. Found to have questionable R hip fracture; CT and Xray are negative for fracture. No N/V/D/C or pain reported .Skin intact PU wise. Patient has lost 5lbs since October [facility] admission. reported UBW:159lbs(?time frame),Patient identified at moderate malnutrition due to weight loss/inadequate energy intake and noted loss of muscle and fat 86y Female with PMH of breast cancer (s/p mastectomy in 2018, radiation in 2019) and recent submassive PE (in 10/2021, on Eliquis) admitted after unwitnessed fall. Found to have questionable R hip fracture; CT and Xray are negative for fracture. No N/V/D/C or pain reported .Skin intact PU wise. Patient has lost 5lbs since October [facility] admission. reported UBW:159lbs(?time frame)Noted weight in OCtober 140lbs,presently 136lbs,BMI:22,Patient identified at severe malnutrition due to weight loss/inadequate energy intake and noted loss of muscle and fat(moderate clavicle wasting ,B/L mild orbital wasting with 13% weight loss<4-6 weeks due to decreased oral intake

## 2021-11-08 NOTE — DISCHARGE NOTE PROVIDER - NSDCMRMEDTOKEN_GEN_ALL_CORE_FT
Eliquis Starter Pack for Treatment of DVT and PE 5 mg oral tablet: 1 tab(s) orally 2 times a day     Please take 2 tabs twice daily for 5 days and then take 1 tab twice daily; stop on October 26. You will be skipping the first 2 days as you have already received those doses while in the hospital for the next 25 days thereafter.   folic acid 1 mg oral tablet: 1 tab(s) orally once   apixaban 5 mg oral tablet: 1 tab(s) orally every 12 hours  folic acid 1 mg oral tablet: 1 tab(s) orally once a day  Multiple Vitamins oral tablet: 1 tab(s) orally once a day  polyethylene glycol 3350 oral powder for reconstitution: 17 gram(s) orally every 12 hours  senna oral tablet: 2 tab(s) orally once a day (at bedtime)

## 2021-11-08 NOTE — PHYSICAL THERAPY INITIAL EVALUATION ADULT - ADDITIONAL COMMENTS
Pt lives in elevator building alone, no DIXIE. Reports indpt with ADLs, able to ambulate 2 blocks and 2 avenues with rolling cart to  [place]. Friend lives in the building she sometimes borrows milk from. No assist in home, recently acquired cane but has not used. Denies falls although per H&P s/p fall, found down. Reports pulling on door knobs in bathroom to assist with toilet transfers.

## 2021-11-08 NOTE — PROGRESS NOTE ADULT - PROBLEM SELECTOR PLAN 9
Found to have DVT/PE on last admission in 10/2021. On Eliquis 5 mg bid.  - Hold Eliquis  - pulm consult/clearance given recent DVT and PEs Found to have DVT/PE on last admission in 10/2021. On Eliquis 5 mg bid home med.  -Restarted Eliquis 5 mg BID  - pulm consult/clearance given recent DVT and PEs Found to have DVT/PE on last admission in 10/2021. On Eliquis 5 mg bid home med.  -Restarted Eliquis 5 mg BID  - pulm consult/clearance given recent DVT and PEs    #afib on admission  - EKG showed afib - already on eliquis  - normal HR now  - will monitor  - will repeat EKG today.

## 2021-11-08 NOTE — PHYSICAL THERAPY INITIAL EVALUATION ADULT - GAIT DISTANCE, PT EVAL
60ft x2, +BM in hallway with LE fatigue. Full assist for toilet hygeine, able to sit on toilet with supervision

## 2021-11-08 NOTE — PHYSICAL THERAPY INITIAL EVALUATION ADULT - MANUAL MUSCLE TESTING RESULTS, REHAB EVAL
MMT B shoulder flexion 3-/5, B hip flexion 4-/5 with sig trunk compensation, B knee flexion/ext 4+/5, B DF 5/5

## 2021-11-08 NOTE — OCCUPATIONAL THERAPY INITIAL EVALUATION ADULT - RANGE OF MOTION EXAMINATION
R shoulder flex limited to 90 degress; L shoulder flexion limited to ~60 degrees (states this is new)/deficits as listed below

## 2021-11-08 NOTE — PROGRESS NOTE ADULT - ASSESSMENT
86y Female with PMH of breast cancer (s/p mastectomy in 2018, radiation in 2019) and recent submassive PE (in 10/2021, on Eliquis) admitted after unwitnessed fall. Found to have questionable R hip fracture. CT and Xray are negative for fracture however Ortho is still discussing whether there is a fracture present.  86y Female with PMH of breast cancer (s/p mastectomy in 2018, radiation in 2019) and recent submassive PE (in 10/2021, on Eliquis) admitted after unwitnessed fall. Found to have questionable R hip fracture; CT and Xray are negative for fracture.

## 2021-11-08 NOTE — PROGRESS NOTE ADULT - PROBLEM SELECTOR PLAN 3
CT shows increased stool burden. No impaction or obstruction. May be cause of urinary retention and UTI    PLAN:  - will start bowel regimen - senna miralax CT shows increased stool burden. No impaction or obstruction. May be cause of urinary retention and UTI    PLAN:  - will start bowel regimen - senna and miralax

## 2021-11-08 NOTE — OCCUPATIONAL THERAPY INITIAL EVALUATION ADULT - ADDITIONAL COMMENTS
Pt lives in elevator building alone, no DIXIE. Reports indpt with ADLs, able to ambulate 2 blocks and 2 avenues with rolling cart to  [place]. Friend lives in the building. No assist in home, recently acquired cane but has not used. Denies falls although per H&P s/p fall, found down. Reports pulling on door knobs in bathroom to assist with toilet transfers.

## 2021-11-08 NOTE — DIETITIAN INITIAL EVALUATION ADULT. - PROBLEM SELECTOR PLAN 4
AG to 20 with lactate to 2.9 and likely starvation ketosis. elevated bhb   - start d5/lr   - Trend lactate  - Trend AG

## 2021-11-08 NOTE — PROGRESS NOTE ADULT - PROBLEM SELECTOR PLAN 6
AG to 20 with lactate to 2.9 and likely starvation ketosis. elevated bhb   - c/w d5/lr at 100cc/hr  - Trend lactate - downtrended  - AG has now closed  - however still acidotic bicarb 17 but improving  - may improve with diet as well  - frequent lung checks while on fluids AG to 20 with lactate to 2.9 and likely starvation ketosis. elevated bhb   - c/w d5/lr at 100cc/hr  - Trend lactate - downtrended  - AG has now closed  - Bicarb improving  - may improve with diet as well  - frequent lung checks while on fluids

## 2021-11-08 NOTE — DISCHARGE NOTE PROVIDER - NSFOLLOWUPCLINICS_GEN_ALL_ED_FT
[facility]  Urology  [address], [address] [place] [address]  Phone: [phone]  Fax:   Scheduled Appointment: 11/19/2021 1:20 PM

## 2021-11-08 NOTE — PROGRESS NOTE ADULT - SUBJECTIVE AND OBJECTIVE BOX
Patient is a 86y old  Female who presents with a chief complaint of Unwitnessed Fall (08 Nov 2021 13:32)      HPI:  HPI: 86y Female with PMH of breast cancer (s/p mastectomy in 2018, radiation in 2019), recent submassive PE (in 10/2021, on Eliquis) admitted after unwitnessed fall.  No LOC. Currently without active complaints  In the ED:    - VS: Tmax: 97.9, HR: , BP: 124-145/81-96, RR: 16, O2: 95-98% on room air     - Pertinent Labs: WBC 8.20, Hb 12.4, Plt 331, Na 146, K 3.8, HCO3 18, AG 20, BUN 26, 0.99  lactate 2.3, t bili 1.8, AST/ALT 70/35, CK 2565    - Imaging: CT head with No intracranial hemorrhage or calvarial fracture. CT C-spine without fracture. CXR without infiltrates or consolidations. R hip X-ray showed valgus impacted femoral neck fracture    - Tx/interventions: NS 1L x 2    - Consults: orthopedics (06 Nov 2021 18:27)      PAST MEDICAL & SURGICAL HISTORY:  Left breast mass    Breast cancer  Left    History of tonsillectomy    S/P left mastectomy        SOCIAL HISTORY:  - Smoking:  - Alcohol:  - Recreational drug use:  - Other:    FAMILY HISTORY:      Allergies    No Known Allergies    Intolerances        MEDICATIONS  (STANDING):  apixaban 5 milliGRAM(s) Oral every 12 hours  cefTRIAXone   IVPB 1000 milliGRAM(s) IV Intermittent every 24 hours  dextrose 40% Gel 15 Gram(s) Oral once  dextrose 5%. 1000 milliLiter(s) (100 mL/Hr) IV Continuous <Continuous>  dextrose 5%. 1000 milliLiter(s) (50 mL/Hr) IV Continuous <Continuous>  dextrose 50% Injectable 25 Gram(s) IV Push once  dextrose 50% Injectable 12.5 Gram(s) IV Push once  dextrose 50% Injectable 25 Gram(s) IV Push once  folic acid 1 milliGRAM(s) Oral daily  glucagon  Injectable 1 milliGRAM(s) IntraMuscular once  influenza  Vaccine (HIGH DOSE) 0.7 milliLiter(s) IntraMuscular once  insulin lispro (ADMELOG) corrective regimen sliding scale   SubCutaneous Before meals and at bedtime  multivitamin 1 Tablet(s) Oral daily  polyethylene glycol 3350 17 Gram(s) Oral every 12 hours  povidone iodine 5% Nasal Swab 1 Application(s) Both Nostrils once  senna 2 Tablet(s) Oral at bedtime    MEDICATIONS  (PRN):  acetaminophen     Tablet .. 650 milliGRAM(s) Oral every 6 hours PRN Temp greater or equal to 38C (100.4F), Mild Pain (1 - 3)      REVIEW OF SYSTEMS:  12 point ROS negative except for that stated in the HPI    PHYSICAL EXAM:  Vitals past 24 Hours: T(C): 36.4 (11-08-21 @ 12:11), Max: 36.7 (11-07-21 @ 20:38)  HR: 78 (11-08-21 @ 12:11) (72 - 113)  BP: 97/63 (11-08-21 @ 12:11) (92/62 - 105/63)  RR: 17 (11-08-21 @ 12:11) (17 - 18)  SpO2: 96% (11-08-21 @ 12:11) (96% - 98%)	    Daily     Daily     GEN: Alert and awake, no acute distress  HEENT: Moist mucous membranes  Neck: No JVD  Cardiovascular: Regular rate and rhythm, +S1 S2. No murmurs, rubs, or gallops appreciated  Respiratory: Lungs clear to auscultation bilaterally  Gastrointestinal:  Soft, non-tender, non-distended, normoactive bowel sounds  Skin: No rashes, No ecchymoses, No cyanosis  Neurologic: Non-focal, alert and oriented x3.   Extremities: No clubbing, cyanosis or edema. Warm, well-perfused extremities.   Vascular: Radial and dorsalis pedis pulses 2+ bilaterally    I&O's Detail    07 Nov 2021 07:01  -  08 Nov 2021 07:00  --------------------------------------------------------  IN:  Total IN: 0 mL    OUT:    Indwelling Catheter - Urethral (mL): 200 mL  Total OUT: 200 mL    Total NET: -200 mL      08 Nov 2021 07:01  -  08 Nov 2021 15:34  --------------------------------------------------------  IN:  Total IN: 0 mL    OUT:    Indwelling Catheter - Urethral (mL): 200 mL  Total OUT: 200 mL    Total NET: -200 mL            LABS:                        9.1    11.51 )-----------( 251      ( 08 Nov 2021 15:06 )             30.4     11-08    x   |  x   |  x   ----------------------------<  x   x    |  x   |  0.66    Ca    8.3<L>      08 Nov 2021 07:26  Phos  3.3     11-08  Mg     2.2     11-08    TPro  5.0<L>  /  Alb  2.5<L>  /  TBili  0.8  /  DBili  x   /  AST  39  /  ALT  29  /  AlkPhos  100  11-08    CARDIAC MARKERS ( 08 Nov 2021 07:26 )  x     / x     / 484 U/L / x     / x      CARDIAC MARKERS ( 07 Nov 2021 08:21 )  x     / 0.18 ng/mL / 1194 U/L / x     / 24.8 ng/mL  CARDIAC MARKERS ( 06 Nov 2021 22:27 )  x     / 0.27 ng/mL / 2262 U/L / x     / 55.7 ng/mL  CARDIAC MARKERS ( 06 Nov 2021 16:26 )  x     / 0.38 ng/mL / 2518 U/L / x     / 60.9 ng/mL      PT/INR - ( 07 Nov 2021 08:21 )   PT: 18.8 sec;   INR: 1.60          PTT - ( 07 Nov 2021 08:21 )  PTT:30.1 sec  Urinalysis Basic - ( 07 Nov 2021 06:42 )    Color: Yellow / Appearance: Clear / SG: >=1.030 / pH: x  Gluc: x / Ketone: 15 mg/dL  / Bili: Moderate / Urobili: 1.0 E.U./dL   Blood: x / Protein: 30 mg/dL / Nitrite: POSITIVE   Leuk Esterase: NEGATIVE / RBC: 5-10 /HPF / WBC < 5 /HPF   Sq Epi: x / Non Sq Epi: 0-5 /HPF / Bacteria: Present /HPF      I&O's Summary    07 Nov 2021 07:01  -  08 Nov 2021 07:00  --------------------------------------------------------  IN: 0 mL / OUT: 200 mL / NET: -200 mL    08 Nov 2021 07:01  -  08 Nov 2021 15:34  --------------------------------------------------------  IN: 0 mL / OUT: 200 mL / NET: -200 mL        CARDIAC DIAGNOSTIC TESTING:    ECG:    TELEMETRY:    ECHO:      RADIOLOGY & ADDITIONAL STUDIES:      ASSESSMENT/PLAN:

## 2021-11-08 NOTE — DISCHARGE NOTE PROVIDER - NSDCCPCAREPLAN_GEN_ALL_CORE_FT
PRINCIPAL DISCHARGE DIAGNOSIS  Diagnosis: Sepsis secondary to UTI  Assessment and Plan of Treatment:       SECONDARY DISCHARGE DIAGNOSES  Diagnosis: Rhabdomyolysis  Assessment and Plan of Treatment:     Diagnosis: Urinary retention  Assessment and Plan of Treatment:     Diagnosis: Constipation  Assessment and Plan of Treatment:     Diagnosis: Fall at home  Assessment and Plan of Treatment:     Diagnosis: History of pulmonary embolism  Assessment and Plan of Treatment:     Diagnosis: Hip fracture  Assessment and Plan of Treatment:      PRINCIPAL DISCHARGE DIAGNOSIS  Diagnosis: Fall at home  Assessment and Plan of Treatment:       SECONDARY DISCHARGE DIAGNOSES  Diagnosis: Sepsis secondary to UTI  Assessment and Plan of Treatment:     Diagnosis: Constipation  Assessment and Plan of Treatment:     Diagnosis: Rhabdomyolysis  Assessment and Plan of Treatment:     Diagnosis: Urinary retention  Assessment and Plan of Treatment:     Diagnosis: History of pulmonary embolism  Assessment and Plan of Treatment:     Diagnosis: Fall at home  Assessment and Plan of Treatment:      PRINCIPAL DISCHARGE DIAGNOSIS  Diagnosis: Fall at home  Assessment and Plan of Treatment: You had a fall at home and  you were unable to get off the ground for 1-2 days. You were found on the floor in your apartment. To avoid further falls we are discharging you to subacute rehab so that you can get physical therapy and proper care there.      SECONDARY DISCHARGE DIAGNOSES  Diagnosis: Sepsis secondary to UTI  Assessment and Plan of Treatment: You had an infection that required antibiotics. You were given IV antibiotics while in the hospital. You no longer need antibiotics as your infection has resolved.    Diagnosis: Constipation  Assessment and Plan of Treatment: You had constipation while you were here in the hospital. We gave you some stool softeners senna and miralax and you had a bowel movement. You will continue these at rehab because constipation may worsen your urinary retention    Diagnosis: Urinary retention  Assessment and Plan of Treatment: You have had urinary retention. The reason may be that you are not walking much right now and you had a recent urinary infection. You will be discharged on a ramirez because you were unable to urinate when we removed it.    Diagnosis: History of pulmonary embolism  Assessment and Plan of Treatment: You have a history of a blood clot in your lungs. You were given eliquis to help treat this blood clot. You should continue eliquis 5mg twice a day for at least 3 months and until you see your hematologist oncologist.    Diagnosis: Fall at home  Assessment and Plan of Treatment:

## 2021-11-08 NOTE — DISCHARGE NOTE PROVIDER - HOSPITAL COURSE
#Discharge: do not delete    Patient is __ yo M/F with past medical history of _____  Presented with _____, found to have _____  Problem List/Main Diagnoses (system-based):   Inpatient treatment course:   New medications:   Labs to be followed outpatient:   Exam to be followed outpatient:    #Discharge: do not delete    Patient is 85 yo M/F with past medical history of breast cancer (s/p mastectomy in 2018, radiation in 2019), recent submassive PE (in 10/21 on Eliquis).   Presented after unwitnessed fall, found to have asymptomatic bacteruria, leukocytosis, bandemia, rhabdomyolysis, and troponemia.    Problem List/Main Diagnoses (system-based):   1. Unwitnessed fall. Patient states she dropped a magazine on the floor, fell to the floor when attempting to pick it up, and was unable to stand. Friend found her 1.5 days later. CTH and C-spine without bleeding or fractures. CT abd/pelvis confirmed no hip fractures, just soft tissue injury. PT recommends KIMBERLI for patient to build strength.   2. SIRS- Patient had leukocytosis and tachycardia on admission. UA had bacteria and nitrites, no leuk esterases, <5 WBC. Urine culture no growth. Found to have bandemia. Patient was treated for sepsis with CTX 1g qd, 2L NS, maintenance fluids.   3. Troponemia- Troponin ___ on admission. Peaked and then downtrending. Likely due to demand ischemia from sepsis. EKG without ischemic changes.   4. Rhabdomyolysis- CK 2565 on admission. Downtrending. Likely due to fall, down for 1.5 days. Patient given 2L NS and maintenance fluids.   5. Urinary retention- Found to have urinary retention on bladder scan. 300 UOP after ramirez placement by urology. Patient failed TOV, straight cath placement very difficult, will be discharged with ramirez.   6. Anemia- Patient came in with Hgb of 12.4 (baseline 10-10.5). Hgb ranged from ____ to ____ on this admission. No signs of blood loss/pooling in pelvis on CT.   Inpatient treatment course:   New medications:   Labs to be followed outpatient:   Exam to be followed outpatient:    #Discharge: do not delete    Patient is 87 yo M/F with past medical history of breast cancer (s/p mastectomy in 2018, radiation in 2019), recent submassive PE (in 10/21 on Eliquis).   Presented after unwitnessed fall, found to have asymptomatic bacteruria, leukocytosis, bandemia, rhabdomyolysis, and troponemia.    Problem List/Main Diagnoses (system-based):   1. Unwitnessed fall. Patient states she dropped a magazine on the floor, fell to the floor when attempting to pick it up, and was unable to stand. Friend found her 1.5 days later. CTH and C-spine without bleeding or fractures. CT abd/pelvis confirmed no hip fractures, just soft tissue injury. PT recommends KIMBERLI for patient to build strength.   2. SIRS- Patient had leukocytosis and tachycardia on admission. UA had bacteria and nitrites, no leuk esterases, <5 WBC. Urine culture no growth. Found to have bandemia. Patient was treated for sepsis with CTX 1g qd, 2L NS, maintenance fluids.   3. Troponemia- Troponin elevated on admission. Peaked and then downtrending. Likely due to demand ischemia from sepsis. EKG without ischemic changes.   4. Rhabdomyolysis- CK 2565 on admission. Downtrending. Likely due to fall, down for 1.5 days. Patient given 2L NS and maintenance fluids.   5. Urinary retention- Found to have urinary retention on bladder scan. 300 UOP after ramirez placement by urology. Patient failed TOV, straight cath placement very difficult, will be discharged with ramirez.   6. Anemia- Patient came in with Hgb of 12.4 (baseline 10-10.5). Hgb ranged from 12.4 to 7.8 on this admission. No signs of blood loss/pooling in pelvis on CT.   Inpatient treatment course:   New medications:   Labs to be followed outpatient:   Exam to be followed outpatient:    #Discharge: do not delete    Patient is 85 yo M/F with past medical history of breast cancer (s/p mastectomy in 2018, radiation in 2019), recent submassive PE (in 10/21 on Eliquis).   Presented after unwitnessed fall, found to have asymptomatic bacteruria, leukocytosis, bandemia, rhabdomyolysis, and troponemia.    Problem List/Main Diagnoses (system-based):   1. Unwitnessed fall. Patient states she dropped a magazine on the floor, fell to the floor when attempting to pick it up, and was unable to stand. Friend found her 1.5 days later. CTH and C-spine without bleeding or fractures. CT abd/pelvis confirmed no hip fractures, just soft tissue injury. PT recommends KIMBERLI for patient to build strength.   2. SIRS- Patient had leukocytosis and tachycardia on admission. UA had bacteria and nitrites, no leuk esterases, <5 WBC. Urine culture no growth. Found to have bandemia. Patient was treated for sepsis with CTX 1g qd, 2L NS, maintenance fluids.   3. Troponemia- Troponin elevated on admission. Peaked and then downtrending. Likely due to demand ischemia from sepsis. EKG without ischemic changes.   4. Rhabdomyolysis- CK 2565 on admission. Downtrended w/ fluids. Likely due to fall, down for 1.5 days. Patient given 2L NS and maintenance fluids.   5. Urinary retention- Found to have urinary retention on bladder scan. 300 UOP after ramirez placement by urology. Patient failed TOV, straight cath very difficult, will be discharged with ramirez b/c still retaining >400cc.  6. Anemia- Patient came in with Hgb of 12.4 (baseline 10-10.5). Hgb ranged from 12.4 to 7.8 on this admission. No signs of blood loss/pooling in pelvis on CT.   7. DVT/PE - recent PE on prior admission in 10/21. C/w eliquis 5mg BID. F/u with outpatient heme/onc  Inpatient treatment course: as above  New medications:   Labs to be followed outpatient: CBC in 1 week  Exam to be followed outpatient: trial of void in 1 week however difficult ramirez placement   #Discharge: do not delete    Patient is 85 yo M/F with past medical history of breast cancer (s/p mastectomy in 2018, radiation in 2019), recent submassive PE (in 10/21 on Eliquis).   Presented after unwitnessed fall, found to have asymptomatic bacteruria, leukocytosis, bandemia, rhabdomyolysis, and troponemia.    Problem List/Main Diagnoses (system-based):   1. Unwitnessed fall. Patient states she dropped a magazine on the floor, fell to the floor when attempting to pick it up, and was unable to stand. Friend found her 1.5 days later. CTH and C-spine without bleeding or fractures. CT abd/pelvis confirmed no hip fractures, just soft tissue injury. PT recommends KIMBERLI for patient to build strength.   2. SIRS- Patient had leukocytosis and tachycardia on admission. UA had bacteria and nitrites, no leuk esterases, <5 WBC. Urine culture no growth. Found to have bandemia. Patient was treated for sepsis with CTX 1g qd, 2L NS, maintenance fluids.   3. Troponemia- Troponin elevated on admission. Peaked and then downtrending. Likely due to demand ischemia from sepsis. EKG without ischemic changes.   4. Rhabdomyolysis- CK 2565 on admission. Downtrended w/ fluids. Likely due to fall, down for 1.5 days. Patient given 2L NS and maintenance fluids.   5. Urinary retention- Found to have urinary retention on bladder scan. 300 UOP after ramirez placement by urology. Patient failed TOV, straight cath very difficult, will be discharged with ramirez b/c still retaining >400cc.  6. Anemia- Patient came in with Hgb of 12.4 (baseline 10-10.5). Hgb ranged from 12.4 to 7.8 on this admission. No signs of blood loss/pooling in pelvis on CT.   7. DVT/PE - recent PE on prior admission in 10/21. C/w eliquis 5mg BID. F/u with outpatient heme/onc  Inpatient treatment course: as above  New medications:   Labs to be followed outpatient: CBC in 1 week  Exam to be followed outpatient: trial of void in 1 week however difficult ramriez placement    .  VITAL SIGNS:  T(C): 36.7 (11-10-21 @ 06:29), Max: 37.1 (11-09-21 @ 20:56)  T(F): 98 (11-10-21 @ 06:29), Max: 98.7 (11-09-21 @ 20:56)  HR: 91 (11-10-21 @ 06:29) (91 - 100)  BP: 112/75 (11-10-21 @ 06:29) (98/67 - 122/62)  BP(mean): --  RR: 17 (11-10-21 @ 06:29) (17 - 17)  SpO2: 97% (11-10-21 @ 06:29) (96% - 97%)  Wt(kg): --    PHYSICAL EXAM:    Constitutional: WDWN resting comfortably in bed; NAD  Head: NC/AT  Eyes: PERRL, EOMI, anicteric sclera, bruising above R brow  ENT: no nasal discharge; uvula midline, no oropharyngeal erythema or exudates; MMM  Neck: supple; no JVD or thyromegaly  Respiratory: CTA B/L; no W/R/R, no retractions  Cardiac: +S1/S2; RRR; no M/R/G; PMI non-displaced  Gastrointestinal: soft, NT/ND; no rebound or guarding; +BSx4  Genitourinary: ramirez in place  Back: spine midline, no bony tenderness or step-offs; no CVAT B/L  Extremities: WWP, no clubbing or cyanosis; no peripheral edema, right hip bruising  Musculoskeletal: NROM x4; no joint swelling, tenderness or erythema  Vascular: 2+ radial, femoral, DP/PT pulses B/L  Dermatologic: skin warm, dry and intact; no rashes, wounds, or scars  Lymphatic: no submandibular or cervical LAD  Neurologic: AAOx3; CNII-XII grossly intact; no focal deficits  Psychiatric: affect and characteristics of appearance, verbalizations, behaviors are appropriate

## 2021-11-08 NOTE — DISCHARGE NOTE PROVIDER - DETAILS OF MALNUTRITION DIAGNOSIS/DIAGNOSES
This patient has been assessed with a concern for Malnutrition and was treated during this hospitalization for the following Nutrition diagnosis/diagnoses:     -  11/08/2021: Severe protein-calorie malnutrition

## 2021-11-08 NOTE — PROGRESS NOTE ADULT - PROBLEM SELECTOR PLAN 2
Pt has bandemia and leukocytosis. She is afebrile, no tachycardia or tachypnea however she did have a bp of 93/59 at one point. Pt also has a starvation ketosis and lactic acidosis.   - UA has bacteria and nitrites, no leuks <5WBC    PLAN:  - treat for sepsis 2/2 UTI  - CTX 1g qd  - s/p 2L IVF  - now on maintenance D5LR  - repeat CBC and CMP at 2pm to monitor Gap and acidosis Pt has bandemia and leukocytosis. She is afebrile, no tachycardia or tachypnea however she did have a bp of 93/59 at one point. Pt also has a starvation ketosis and lactic acidosis.   - UA has bacteria and nitrites, no leuks <5WBC  - Urine culture- insignificant growth    PLAN:  - treat for sepsis 2/2 UTI  - CTX 1g qd  - s/p 2L IVF  - now on maintenance D5LR  - monitor Gap and acidosis

## 2021-11-08 NOTE — PROGRESS NOTE ADULT - SUBJECTIVE AND OBJECTIVE BOX
Patient was seen and examined by me at bedside. I agree with resident's note, subjective, objective physical exam, assessment and plan with following modifications/additions.    Greater than 35 minutes spent on total encounter; more than 50% of the visit was spent counseling and/or coordinating care by the attending physician.    86y Female with PMH of breast cancer (s/p mastectomy in 2018, radiation in 2019) and recent submassive PE (in 10/2021, on Eliquis) admitted after unwitnessed fall no fracture on CT, course c/b sepsis 2/2 UTI (sig bandemia noted) improving on abx (neg cx data so far, pending final urine and blood cx. Noted mild relative anemia this visit no active bleeding monitoring for now and rectal to ensure no melena as pt on eliquis, PT eval appreciated and pt considering [facility] on my interview vs home    Eric Desir MD [id number]  Patient was seen and examined by me at bedside. I agree with resident's note, subjective, objective physical exam, assessment and plan with following modifications/additions.    Greater than 35 minutes spent on total encounter; more than 50% of the visit was spent counseling and/or coordinating care by the attending physician.    86y Female with PMH of breast cancer (s/p mastectomy in 2018, radiation in 2019) and recent submassive PE (in 10/2021, on Eliquis) admitted after unwitnessed fall no fracture on CT, course c/b sepsis 2/2 UTI (sig bandemia noted) improving on abx (neg cx data so far, pending final urine and blood cx).  Noted mild relative anemia this visit no active bleeding monitoring for now and rectal to ensure no melena as pt on eliquis, TOV pending dispo status with ramirez in place now,  PT eval appreciated and pt considering [facility]BER[facility] on my interview    Eric Desir MD [id number]

## 2021-11-08 NOTE — PROGRESS NOTE ADULT - SUBJECTIVE AND OBJECTIVE BOX
Ortho Note    Pt seen and examined on morning rounds. Pt comfortable without complaints, pain controlled.  Denies CP, SOB, N/V, numbness/tingling     Vital Signs Last 24 Hrs  T(C): 36.7 (08 Nov 2021 06:11), Max: 36.8 (07 Nov 2021 12:09)  T(F): 98.1 (08 Nov 2021 06:11), Max: 98.2 (07 Nov 2021 12:09)  HR: 84 (08 Nov 2021 06:11) (84 - 98)  BP: 105/63 (08 Nov 2021 06:11) (100/61 - 105/63)  BP(mean): --  RR: 18 (08 Nov 2021 06:11) (18 - 18)  SpO2: 98% (08 Nov 2021 06:11) (97% - 98%)    Physical Exam:  General: Pt Alert and oriented, NAD + ramirez  Pulses: 2+ dp, pt pulses, toeswwp, cap refill <3 seconds  Sensation: SILT sural/saph/sp/dp/ tibial distributions  Motor: 5/5 EHL/FHL/TA/GS firing  No pain with log roll or axial loading    A/P: 86yFemale presents with R hip pain following mechanical fall, concerning for R hip FNF, CT images reviewed - negative for FNF.   -No acute ortho intervention  -Pain control  -PT for mobilization  -WBS: WBAT with RW  -Care per primary team

## 2021-11-08 NOTE — PROGRESS NOTE ADULT - PROBLEM SELECTOR PLAN 12
F: s/p 2L NS, now on D5LR at 100cc/hr  E: Replete K<4, Mg<2  N: Regular diet; make NPO prior to orthopedic surgery  VTE Ppx: holding Eliquis i/s/o orthopedic surgery, SCDs for now, will start heparin if Hgb stable.  GI: not needed  C: Full Code  D: RMF for orthopedic surgery F: s/p 2L NS, now on D5LR at 100cc/hr  E: Replete K<4, Mg<2  N: Regular diet; make NPO prior to orthopedic surgery  VTE Ppx: holding Eliquis i/s/o orthopedic surgery, SCDs for now, will start heparin if Hgb stable.  GI: not needed  C: Full Code F: s/p 2L NS, now on D5LR at 100cc/hr  E: Replete K<4, Mg<2  N: Regular diet; make NPO prior to orthopedic surgery  VTE Ppx: Eliquis 5 mg BID  GI: not needed  C: Full Code

## 2021-11-08 NOTE — DISCHARGE NOTE PROVIDER - NSDCFUADDAPPT_GEN_ALL_CORE_FT
d Please schedule followup within 1 week for  urology resident clinic [phone] Please schedule followup within 1 week for  urology resident clinic [phone] Please bring your Insurance card, Photo ID, Covid-19 vaccination card (if applicable) and Discharge paperwork to the following appointment:    Please follow up with your Urology Provider    Appointment was scheduled by Ms. DEJAN Banda, Referral Coordinator.   Please bring your Insurance card, Photo ID, Covid-19 vaccination card (if applicable) and Discharge paperwork to the following appointment:    Please follow up with [facility] located at [address] [place] [address] on 11/19/2021 at 1:20pm.    Appointment was scheduled by Ms. DEJAN Banda, Referral Coordinator.

## 2021-11-08 NOTE — DIETITIAN NUTRITION RISK NOTIFICATION - TREATMENT: THE FOLLOWING DIET HAS BEEN RECOMMENDED
Diet, Regular:   Supplement Feeding Modality:  Oral  Ensure Enlive Cans or Servings Per Day:  1       Frequency:  Two Times a day (11-07-21 @ 12:26) [Active]

## 2021-11-08 NOTE — DIETITIAN INITIAL EVALUATION ADULT. - PROBLEM SELECTOR PLAN 2
I/s/o fall. Bruising around R hip, as well as R orbit, R shoulder, and b/l knees. Evidence of valgus impacted femoral neck fracture on R hip X-ray. Reports no pain currently. Plan is for R hip pinning vs. hemiarthroplasty when medically cleared.   - not currently optimized. f/up fall and infectious workup  - f/up cards recs   - Preop labs (CBC, BMP, coags, T&S, UA, ECG, CXR, COVID)  - NPO/no IVF past midnight when medically cleared for surgery  - Tylenol PRN for pain

## 2021-11-08 NOTE — OCCUPATIONAL THERAPY INITIAL EVALUATION ADULT - DIAGNOSIS, OT EVAL
OT deficits include impaired posture, safety awareness, BUE ROM and strength, balance, endurance affecting ability to complete ADL, functional mobility and transfers.

## 2021-11-08 NOTE — DISCHARGE NOTE PROVIDER - CARE PROVIDER_API CALL
Cait Dee)  Internal Medicine  [address], [place] [address]  Phone: [phone]  Fax: ()-  Scheduled Appointment: 11/16/2021 04:30 PM   Cait Dee)  Internal Medicine  [address], [place] [address]  Phone: [phone]  Fax: ()-  Scheduled Appointment: 11/16/2021 04:30 PM    UROLOGY CLINIC, [facility]  UROLOGY  [address], [address]  [place] [address]  Phone: [phone]  Fax: (   )    -  Scheduled Appointment: 11/19/2021 01:20 PM

## 2021-11-08 NOTE — PHYSICAL THERAPY INITIAL EVALUATION ADULT - GENERAL OBSERVATIONS, REHAB EVAL
Admitted s/p fall, spoke to Timothy (-) for FNF fx, WBAT. Pt rcvd semi supine, +bedalarm, +IV, +ramirez. Pt agreeable to PT, denies pain, A&Ox3. Tolerated session fairly well, demo CG bed mob, ModA transfers, amb 60ftx2 Steph with RW, +BM with amb.

## 2021-11-08 NOTE — DIETITIAN INITIAL EVALUATION ADULT. - PROBLEM SELECTOR PLAN 6
Found to have DVT/PE on last admission in 10/2021. On Eliquis 5 mg bid.  - Hold Eliquis  - no current signs of bleeding, repeat cbc- if stable start hep ggt for DVTs and PEs  - pulm consult/clearance given recent DVT and PEs  - repeat dopplers

## 2021-11-08 NOTE — DISCHARGE NOTE PROVIDER - PROVIDER TOKENS
PROVIDER:[TOKEN:[[id number]],SCHEDULEDAPPT:[11/16/2021],SCHEDULEDAPPTTIME:[04:30 PM]] PROVIDER:[TOKEN:[[id number]],SCHEDULEDAPPT:[11/16/2021],SCHEDULEDAPPTTIME:[04:30 PM]],FREE:[LAST:[UROLOGY CLINIC],FIRST:[SHAWNEE MONROE],PHONE:[phone]],FAX:[(   )    -],ADDRESS:[UROLOGY  [address] [place] [address]],SCHEDULEDAPPT:[11/19/2021],SCHEDULEDAPPTTIME:[01:20 PM]]

## 2021-11-08 NOTE — PHYSICAL THERAPY INITIAL EVALUATION ADULT - PERTINENT HX OF CURRENT PROBLEM, REHAB EVAL
86F found down by her neighbor, does not remember falling or how long she was down. XR and CT neg for FNF.

## 2021-11-08 NOTE — PROGRESS NOTE ADULT - PROBLEM SELECTOR PLAN 10
After confirmation of potential allergies, a lidocaine was applied to numb the right nare, followed by trans-nasal insertion of a High-Resolution Manometry Catheter 53cm. Pressure bands of both UES and LES was observed on the color contour. Patient instructed to take a deep breath to verify placement of catheter; diaphragmatic pinch noted on inspiration. Patient was assisted to supine position and catheter was stabilized by taping at nare. Patient was encouraged to relax while acclimating to catheter for approximately 5 minutes. A 30 second baseline pressure was obtained to identify the UES and LES followed buy a series of wet swallows using 5ml of room temperature 0.9% sodium chloride to assess esophageal motility and bolus transit. At the conclusion of the procedure; the catheter was removed. Pt tolerated all well. Advised pt to avoid hot or cold food and/or drinks for the next 20 minute. Instructed pt to call with any questions or concerns, and patient they should get results from their provider in the next 1-2 weeks. Pt verbalizes understanding.   Hx of breast cancer (s/p resection and axillary LN dissection). Follows with Dr. Sheridan.  - Continue to follow up as outpatient

## 2021-11-08 NOTE — PROGRESS NOTE ADULT - PROBLEM SELECTOR PLAN 5
Afebrile, hemodynamically stable. No cough, h/a, or urinary symptoms. Now has leukocytosis and bandemia on labs. CXR unremarkable. CXR w/o infiltrate. Treat for UTI as above  - F/u BCx  - UA - + for nitrites and bacteria, - for leuk esterases and wbc  - f/u UCx  - denies dysuria however did have urinary retention on bladder scan - 300 UOP after ramirez placement by Urology Afebrile, hemodynamically stable. No cough, h/a, or urinary symptoms. Now has leukocytosis and bandemia on labs. CXR w/o infiltrate. Treat for UTI as above  - F/u BCx  - UA - + for nitrites and bacteria, - for leuk esterases and wbc  - Urine culture- no significant growth  - denies dysuria however did have urinary retention on bladder scan - 300 UOP after ramirez placement by Urology

## 2021-11-08 NOTE — PROGRESS NOTE ADULT - PROBLEM SELECTOR PLAN 7
I/s/o recent fall. Lactic acidosis to 2.9 and CK elevated at 2565. S/p 2L NS.  -mild CK, Lfts  - Trend CK - downtrending  - maintenance fluids as above    #Troponemia: denies CP/sob. in setting of fall and rhabdo. EKG w/o ischemic changes  - downtrending trops now   - no need to continue to trend  - likely demand and rhabdo

## 2021-11-08 NOTE — OCCUPATIONAL THERAPY INITIAL EVALUATION ADULT - LEVEL OF INDEPENDENCE: TOILET, REHAB EVAL
demonstrated decreased safety awareness during toilet transfer/moderate assist (50% patients effort)

## 2021-11-08 NOTE — PROGRESS NOTE ADULT - PROBLEM SELECTOR PLAN 1
Unwitnessed fall at home likely yesterday morning. Denies preceding lightheadedness, dizziness, or palpitations but memory of event is limited but denies LOC. CTH and C-spine without bleed or fractures.    - F/u ECG  - cardiac enzymes peaked and now downtrending - likely due to rhabdo and demand ischemia from being down  - pro-BNP elevated however euvolemic to hypovolemic on exam  - TSH - wnl  - F/u B12/folate  - Orthostatics when able to stand  - CT abd/pelvis confirmed no hip fracture just soft tissue injury Unwitnessed fall at home likely yesterday morning. Denies preceding lightheadedness, dizziness, or palpitations. Memory of event is limited but denies LOC. CTH and C-spine without bleed or fractures.    - F/u ECG  - cardiac enzymes peaked and now downtrending - likely due to rhabdo and demand ischemia from being down  - pro-BNP elevated however euvolemic to hypovolemic on exam  - TSH - wnl  - F/u B12/folate  - Orthostatics when able to stand  - CT abd/pelvis confirmed no hip fracture just soft tissue injury Unwitnessed fall at home likely yesterday morning. Denies preceding lightheadedness, dizziness, or palpitations. Memory of event is limited but denies LOC. CTH and C-spine without bleed or fractures.    - F/u ECG  - cardiac enzymes peaked and now downtrending - likely due to rhabdo and demand ischemia from being down  - pro-BNP elevated however euvolemic to hypovolemic on exam  - TSH - wnl  - Vitamin B12- wnl  - Folate- low  - Orthostatics when able to stand  - CT abd/pelvis confirmed no hip fracture just soft tissue injury

## 2021-11-08 NOTE — DISCHARGE NOTE PROVIDER - CARE PROVIDERS DIRECT ADDRESSES
Hospitalist ,[email or web address]

## 2021-11-08 NOTE — OCCUPATIONAL THERAPY INITIAL EVALUATION ADULT - ANTICIPATED DISCHARGE DISPOSITION, OT EVAL
KIMBERLI. URIEL Wallace made aware. Should pt refuse KIMBERLI pt would need a commode, shower chair, HOT and assistance in home/rehabilitation facility

## 2021-11-08 NOTE — OCCUPATIONAL THERAPY INITIAL EVALUATION ADULT - PLANNED THERAPY INTERVENTIONS, OT EVAL
ADL retraining/bed mobility training/cognitive, visual perceptual/ROM/strengthening/transfer training

## 2021-11-08 NOTE — DIETITIAN INITIAL EVALUATION ADULT. - PROBLEM SELECTOR PLAN 5
I/s/o recent fall. Lactic acidosis to 2.9 and CK elevated at 2565. S/p 2L NS.  -mild CK, Lfts  - Trend CK  - maintenance fluids as above    #Troponemia: denies CP/sob. in setting of fall and rhabdo. EKG w/o ischemic changes  - cards recs   - trend trops

## 2021-11-08 NOTE — CONSULT NOTE ADULT - ASSESSMENT
per Internal Medicine    86 y o  Female with PMH of breast cancer (s/p mastectomy in 2018, radiation in 2019) and recent submassive PE (in 10/2021, on Eliquis) admitted after unwitnessed fall. Found to have questionable R hip fracture; CT and Xray are negative for fracture.     Problem/Plan - 1:  ·  Problem: Fall at home.   ·  Plan: Unwitnessed fall at home likely yesterday morning. Denies preceding lightheadedness, dizziness, or palpitations. Memory of event is limited but denies LOC. CTH and C-spine without bleed or fractures.    - F/u ECG  - cardiac enzymes peaked and now downtrending - likely due to rhabdo and demand ischemia from being down  - pro-BNP elevated however euvolemic to hypovolemic on exam  - TSH - wnl  - Vitamin B12- wnl  - Folate- low  - Orthostatics when able to stand  - CT abd/pelvis confirmed no hip fracture just soft tissue injury.    Problem/Plan - 2:  ·  Problem: Sepsis secondary to UTI.   ·  Plan: Pt has bandemia and leukocytosis. She is afebrile, no tachycardia or tachypnea however she did have a bp of 93/59 at one point. Pt also has a starvation ketosis and lactic acidosis.   - UA has bacteria and nitrites, no leuks <5WBC  - Urine culture- insignificant growth    PLAN:  - treat for sepsis 2/2 UTI  - CTX 1g qd  - s/p 2L IVF  - now on maintenance D5LR  - monitor Gap and acidosis.    Problem/Plan - 3:  ·  Problem: Constipation.   ·  Plan: CT shows increased stool burden. No impaction or obstruction. May be cause of urinary retention and UTI    PLAN:  - will start bowel regimen - senna and miralax.    Problem/Plan - 4:  ·  Problem: Anemia.   ·  Plan: Pt came in with a Hgb of 12.4 (baseline hgb around 10-10.5). Hgb now 8.6. No signs of blood loss/pooling in pelvis on CT. Likely concentrated on admission and repeat 9.5 was dilutional after receiving 2L of IVF. However will continue to monitor closely.  - vitals have been stable  - Hemolysis less likely: elevated LDH to 429 but normal haptoglobin and normal bilirubin     PLAN:  - monitor CBC  - monitor HR, BP closely  - maintain active t&s.    Problem/Plan - 5:  ·  Problem: Bandemia without diagnosis of specific infection.   ·  Plan: Afebrile, hemodynamically stable. No cough, h/a, or urinary symptoms. Now has leukocytosis and bandemia on labs. CXR w/o infiltrate. Treat for UTI as above  - F/u BCx  - UA - + for nitrites and bacteria, - for leuk esterases and wbc  - Urine culture- no significant growth  - denies dysuria however did have urinary retention on bladder scan - 300 UOP after ramirez placement by Urology.    Problem/Plan - 6:  ·  Problem: Lactic acidosis.   ·  Plan: AG to 20 with lactate to 2.9 and likely starvation ketosis. elevated bhb   - c/w d5/lr at 100cc/hr  - Trend lactate - downtrended  - AG has now closed  - Bicarb improving  - may improve with diet as well  - frequent lung checks while on fluids.    Problem/Plan - 7:  ·  Problem: Rhabdomyolysis.   ·  Plan: I/s/o recent fall. Lactic acidosis to 2.9 and CK elevated at 2565. S/p 2L NS.  -mild CK, Lfts  - Trend CK - downtrending  - maintenance fluids as above    #Troponemia: denies CP/sob. in setting of fall and rhabdo. EKG w/o ischemic changes  - downtrending trops now   - no need to continue to trend  - likely demand and rhabdo.    Problem/Plan - 8:  ·  Problem: Urinary retention.   ·  Plan: Bladder scan showed >300cc in bladder. urology consulted for difficult straight cath. ramirez was placed by urology.  - UA - + for nitrites and bacteria, - for leuk esterases and wbc  - denies dysuria however did have urinary retention on bladder scan - 300 UOP after ramirez placement by Urology  - Urine culture- no significant growth.    Problem/Plan - 9:  ·  Problem: History of pulmonary embolism.   ·  Plan: Found to have DVT/PE on last admission in 10/2021. On Eliquis 5 mg bid home med.  -Restarted Eliquis 5 mg BID  - pulm consult/clearance given recent DVT and PEs.    Problem/Plan - 10:  ·  Problem: Breast cancer.   ·  Plan; Hx of breast cancer (s/p resection and axillary LN dissection). Follows with Dr. Sheridan.  - Continue to follow up as outpatient.    Problem/Plan - 11:  ·  Problem: Hyperbilirubinemia.   ·  Plan: Bilirubin to 1.8 on labs, up from 0.7 during last admission. ALP is upper limit of normal.  - Trend CMP.    Problem/Plan - 12:  ·  Problem: Nutrition, metabolism, and development symptoms.   ·  Plan: F: s/p 2L NS, now on D5LR at 100cc/hr  E: Replete K<4, Mg<2  N: Regular diet; make NPO prior to orthopedic surgery  VTE Ppx: Eliquis 5 mg BID  GI: not needed  C: Full Code.

## 2021-11-08 NOTE — DIETITIAN INITIAL EVALUATION ADULT. - PROBLEM SELECTOR PLAN 3
Afebrile, hemodynamically stable. No cough, h/a, or urinary symptoms. No leukocytosis but bandemia on labs. CXR unremarkable. CXR w/o infiltrate  - Obtain rectal temperature  - F/u BCx  - Collect UA

## 2021-11-08 NOTE — OCCUPATIONAL THERAPY INITIAL EVALUATION ADULT - IMPAIRMENTS CONTRIBUTING IMPAIRED BED MOBILITY, REHAB EVAL
impaired balance/cognition/impaired coordination/impaired postural control/decreased ROM/decreased strength

## 2021-11-08 NOTE — PROGRESS NOTE ADULT - PROBLEM SELECTOR PLAN 8
Bladder scan showed >300cc in bladder. urology consulted for difficult straight cath. ramirez was placed by urology.  - UA - + for nitrites and bacteria, - for leuk esterases and wbc  - denies dysuria however did have urinary retention on bladder scan - 300 UOP after ramirez placement by Urology  - f/u Ucx Bladder scan showed >300cc in bladder. urology consulted for difficult straight cath. ramirez was placed by urology.  - UA - + for nitrites and bacteria, - for leuk esterases and wbc  - denies dysuria however did have urinary retention on bladder scan - 300 UOP after ramirez placement by Urology  - Urine culture- no significant growth

## 2021-11-08 NOTE — OCCUPATIONAL THERAPY INITIAL EVALUATION ADULT - GENERAL OBSERVATIONS, REHAB EVAL
Admitted s/p fall, spoke to Timothy (-) for FNF fx, WBAT. Pt received semi-supine, A&Ox4, +ramirez +IV, +bed alarm, in NAD and agreeable to work with OT.

## 2021-11-08 NOTE — CONSULT NOTE ADULT - SUBJECTIVE AND OBJECTIVE BOX
Orthopaedic Surgery Consult Note    For Surgeon: Dr. Pritchard    HPI:  86F found down by her neighbor, does not remember falling or how long she was down. In ED, XR R hip obtained due to ecchymosis showing valgus impacted femoral neck fracture. Denies any numbness or tingling. Denies injury elsewhere.    Vital Signs Last 24 Hrs  T(C): 36.4 (06 Nov 2021 16:31), Max: 36.6 (06 Nov 2021 15:11)  T(F): 97.5 (06 Nov 2021 16:31), Max: 97.9 (06 Nov 2021 15:11)  HR: 80 (06 Nov 2021 16:31) (80 - 106)  BP: 124/81 (06 Nov 2021 16:31) (124/81 - 145/96)  BP(mean): --  RR: 16 (06 Nov 2021 16:31) (16 - 16)  SpO2: 98% (06 Nov 2021 16:31) (95% - 98%)    Physical Exam:  General: NAD, resting comfortably in bed  RLE:  Skin intact, ecchymosis about hip  5/5 EHL/FHL/TA/GS  SILT over distal limb  DP/PT palpable    Imaging:  X-ray R hip - Valgus impacted femoral neck fracture.    A/P: 86yFemale presents with R hip femoral neck fracture following mechanical fall.  -Added on to OR schedule for 11/7 07:30AM  -Consented for R hip pinning vs. hemiarthroplasty  -Medicine primary team pre-op clearance  -Pre-op workup - CBC, BMP, PT/PTT/INR, T&S, UA, EKG, CXR, COVID   -Pain control  -NPO/IVF past midnight  -Discussed with Dr. Pritchard    Ortho Pager [phone]  
  Patient is a 86y old  Female who presents with a chief complaint of Unwitnessed Fall (08 Nov 2021 12:13)       HPI:  HPI: 86y Female with PMH of breast cancer (s/p mastectomy in 2018, radiation in 2019), recent submassive PE (in 10/2021, on Eliquis) admitted after unwitnessed fall. Patient does not remember many details of fall. Fell on her right side as she got up from the the couch. Denies head trauma or losing consciousness but does not remember details of fall. Denies preceding dizziness, lightheadedness, chest pain, dyspnea, or palpitations. Per caretaker, patient likely fell yesterday morning as she had not taken her pills or returned a check-in email. Caretaker found patient on the floor, incontinent and called ambulance. Patient reports that she feels generally more frail. Caretaker believes that patient has been very stressed and down when she had to renew her Green Card (she is from [place]) in addition to staying home during COVID. She feels as if patient has had more difficult of a time taking care of herself, even though at baseline, patient can perform all ADLs and IADLs. Patient walks with the assistance of a walker. Reports increasingly poor oral intake, though she eats a balanced diet including chicken, fish, and potatoes. Recently admitted to our instituion in 10/2021 for weakness found to have submassive PE and discharged on Eliquis. Denies fevers, chills, cough, chest pain, dyspnea, nausea, headache, diarrhea, sick contacts, change in urinary or bowel habits.    In the ED:    - VS: Tmax: 97.9, HR: , BP: 124-145/81-96, RR: 16, O2: 95-98% on room air     - Pertinent Labs: WBC 8.20, Hb 12.4, Plt 331, Na 146, K 3.8, HCO3 18, AG 20, BUN 26, 0.99  lactate 2.3, t bili 1.8, AST/ALT 70/35, CK 2565    - Imaging: CT head with No intracranial hemorrhage or calvarial fracture. CT C-spine without fracture. CXR without infiltrates or consolidations. R hip X-ray showed valgus impacted femoral neck fracture    - Tx/interventions: NS 1L x 2    - Consults: orthopedics (06 Nov 2021 18:27)      PAST MEDICAL & SURGICAL HISTORY:  Left breast mass    Breast cancer  Left    History of tonsillectomy    S/P left mastectomy        MEDICATIONS  (STANDING):  apixaban 5 milliGRAM(s) Oral every 12 hours  cefTRIAXone   IVPB 1000 milliGRAM(s) IV Intermittent every 24 hours  dextrose 40% Gel 15 Gram(s) Oral once  dextrose 5%. 1000 milliLiter(s) (100 mL/Hr) IV Continuous <Continuous>  dextrose 5%. 1000 milliLiter(s) (50 mL/Hr) IV Continuous <Continuous>  dextrose 50% Injectable 25 Gram(s) IV Push once  dextrose 50% Injectable 12.5 Gram(s) IV Push once  dextrose 50% Injectable 25 Gram(s) IV Push once  folic acid 1 milliGRAM(s) Oral daily  glucagon  Injectable 1 milliGRAM(s) IntraMuscular once  influenza  Vaccine (HIGH DOSE) 0.7 milliLiter(s) IntraMuscular once  insulin lispro (ADMELOG) corrective regimen sliding scale   SubCutaneous Before meals and at bedtime  multivitamin 1 Tablet(s) Oral daily  polyethylene glycol 3350 17 Gram(s) Oral every 24 hours  povidone iodine 5% Nasal Swab 1 Application(s) Both Nostrils once  senna 2 Tablet(s) Oral at bedtime    MEDICATIONS  (PRN):  acetaminophen     Tablet .. 650 milliGRAM(s) Oral every 6 hours PRN Temp greater or equal to 38C (100.4F), Mild Pain (1 - 3)        Social History:                -  Home Living Status:  lives alone in an elevator accessible apartment building         -  Prior Home Care Services:   none         Baseline Functional Level Prior to Admission:             - ADL's/ IADL's:  independent         - ambulatory status PTA:  walked without assistive devices at home, uses a shopping cart in the community    FAMILY HISTORY:      CBC Full  -  ( 08 Nov 2021 07:26 )  WBC Count : 10.41 K/uL  RBC Count : 3.17 M/uL  Hemoglobin : 8.6 g/dL  Hematocrit : 26.8 %  Platelet Count - Automated : 229 K/uL  Mean Cell Volume : 84.5 fl  Mean Cell Hemoglobin : 27.1 pg  Mean Cell Hemoglobin Concentration : 32.1 gm/dL  Auto Neutrophil # : 9.39 K/uL  Auto Lymphocyte # : 0.93 K/uL  Auto Monocyte # : 0.09 K/uL  Auto Eosinophil # : 0.00 K/uL  Auto Basophil # : 0.00 K/uL  Auto Neutrophil % : 90.2 %  Auto Lymphocyte % : 8.9 %  Auto Monocyte % : 0.9 %  Auto Eosinophil % : 0.0 %  Auto Basophil % : 0.0 %      11-08    144  |  116<H>  |  31<H>  ----------------------------<  93  4.0   |  21<L>  |  0.68    Ca    8.3<L>      08 Nov 2021 07:26  Phos  1.2     11-08  Mg     2.1     11-08    TPro  5.0<L>  /  Alb  2.5<L>  /  TBili  0.8  /  DBili  x   /  AST  39  /  ALT  29  /  AlkPhos  100  11-08      Urinalysis Basic - ( 07 Nov 2021 06:42 )    Color: Yellow / Appearance: Clear / SG: >=1.030 / pH: x  Gluc: x / Ketone: 15 mg/dL  / Bili: Moderate / Urobili: 1.0 E.U./dL   Blood: x / Protein: 30 mg/dL / Nitrite: POSITIVE   Leuk Esterase: NEGATIVE / RBC: 5-10 /HPF / WBC < 5 /HPF   Sq Epi: x / Non Sq Epi: 0-5 /HPF / Bacteria: Present /HPF          Radiology:    < from: Xray Chest 1 View- PORTABLE-Urgent (Xray Chest 1 View- PORTABLE-Urgent .) (11.06.21 @ 16:45) >  EXAM:  XR CHEST PORTABLE URGENT 1V                          PROCEDURE DATE:  11/06/2021          INTERPRETATION:  Clinical History: Pain    Frontal examination of the chest demonstrates the heart to be within normal limits in transverse diameter. No acute infiltrates. Hiatal hernia. Dextro scoliosis thoracic spine with degenerative changes. Surgical clips left lateral chest wall. Calcification aortic knob.    IMPRESSION: No acute infiltrates        < from: CT Head No Cont (11.06.21 @ 17:17) >  EXAM:  CT BRAIN                          PROCEDURE DATE:  11/06/2021          INTERPRETATION:  PROCEDURE: CT head without contrast.    INDICATION: Trauma; status post fall    TECHNIQUE: Multiple axial sections were obtained at 5 mm intervals. The images were reviewed in brain and bone windows. Sagittal and coronal reformatted images were obtained from the axial data set. The images were reviewed in brain and bone windows.    COMPARISON: CT head 10/18/2021    FINDINGS: The CT examination demonstrates generalized volume loss. There is no midline shift or extra axial collections. The gray white differentiation appears within normal limits. There is no intracranial hemorrhage or acute transcortical infarct. There are patchy areas of hypodensitywithin the periventricular white matter which may represent the sequela of small vessel ischemic disease.    The bony windows demonstrates no fractures. There is minimum mucosal thickening within the right maxillary sinus. The rest of the visualized paranasal sinuses are within normal limits. The mastoid air cells are well aerated.    IMPRESSION: No intracranial hemorrhage or calvarial fracture.        < from: CT Cervical Spine No Cont (11.06.21 @ 17:17) >  EXAM:  CT CERVICAL SPINE                          PROCEDURE DATE:  11/06/2021          INTERPRETATION:  I, Lynsey Hare MD, have reviewed the images and the report and agree with the findings, with the following modification:    At the C5/6 level, there is a small to moderate-sized central disc osteophyte complex indenting the thecal sac. There is bilateral uncovertebral joint and degenerative facet disease. There is mild central spinal canal stenosis. There is severe bilateral neural foramen narrowing.    ******PRELIMINARY REPORT******    PROCEDURE: CT cervical spine without contrast    INDICATION: Fall.    TECHNIQUE: Multiple axial sections were obtained from the mid orbits to the sternoclavicular joint. Sagittal and coronal reformats were obtained from the axial data set. The images were reviewed in soft tissue and bone windows.    COMPARISON: None.    FINDINGS: The CT exam demonstrates exaggeration of the normal cervical lordosis which may be secondary to positioning. The vertebral body heights and intervertebral disc spaces are maintained. The prevertebral soft tissues are within normal limits. There is anterior osteophytic lipping throughout the cervical spine. Small posterior osteophyte at C4. Osteopenic bony appearance. The osseous structures are intact without acute fracture. There is no facet subluxation or malalignment at the craniovertebral articulations.    Small amount of patchy opacities in the right lung apex.    IMPRESSION:    1.  No acute bony fracture or malalignment.  2.  There is decreased bony mineralization and degenerative changes of the cervical spine as described above.      < from: CT Pelvis Bony Only No Cont (11.07.21 @ 10:18) >  EXAM:  CT PELVIS BONY ONLY                          PROCEDURE DATE:  11/07/2021          INTERPRETATION:  HISTORY: Right hip pain status post fall.    Helical CT imaging of the bony pelvis was performed without intravenous contrast. Sagittal and coronal reformats were provided. 3-D reformats were performed on a separate workstation.    Correlation is made with radiographs from 6 2021.    FINDINGS:    There is no evidence of acute fracture or dislocation. There is ill-defined soft tissue contusion within the subcutaneous fat overlying the right greater trochanter. No well-defined intramuscular or myofascial hematoma is demonstrated.    There is severe right and moderate to severe left hip arthrosis. There is chronic osteophytic fragmentationalong the right posterior acetabular rim. Pubic symphysis is intact. There is bilateral sacral iliac joint arthrosis. There is lower lumbar spondylosis with vacuum disc phenomenon at L4/L5.    There is colonic diverticulosis. There is a Clark catheter within the urinary bladder. There is mild presacral edema. There is osseous disease.    IMPRESSION:    No evidence of acute fracture or dislocation.    Ill-defined soft tissue contusion within the lateral subcutaneous fat overlying the right greatertrochanter.                Vital Signs Last 24 Hrs  T(C): 36.4 (08 Nov 2021 12:11), Max: 36.7 (07 Nov 2021 20:38)  T(F): 97.6 (08 Nov 2021 12:11), Max: 98.1 (07 Nov 2021 20:38)  HR: 78 (08 Nov 2021 12:11) (72 - 113)  BP: 97/63 (08 Nov 2021 12:11) (92/62 - 105/63)  BP(mean): 72 (08 Nov 2021 10:45) (72 - 72)  RR: 17 (08 Nov 2021 12:11) (17 - 18)  SpO2: 96% (08 Nov 2021 12:11) (96% - 98%)        REVIEW OF SYSTEMS: s/p fall, right hip pain        Physical Exam:  85 yo woman lying in semi Almeida's position, awake, alert, no acute complaints    Head: normocephalic, atraumatic    Eyes: PERRLA, EOMI, no nystagmus, sclera anicteric    ENT: nasal discharge, uvula midline, no oropharyngeal erythema/exudate    Neck: supple, negative JVD, negative carotid bruits, no thyromegaly    Chest: CTA bilaterally, neg wheeze/ rhonchi/ rales/ crackles/ egophany    Cardiovascular: regular rate and rhythm, neg murmurs/rubs/gallops    Abdomen: soft, non distended, non tender to palpation in all 4 quadrants, negative rebound/guarding, normal bowel sounds    Extremities: WWP, neg cyanosis/clubbing/edema, negative calf tenderness to palpation, negative Carlos Eduardo's sign    Musculoskeletal: R hip swelling/ ttp, neg log roll , no pain with axial load    Neurologic Exam:    Alert and oriented x 3 to person, place, date/year, speech fluent w/o dysarthria, follows commands, recent and remote memory intact, repetition intact, comprehension intact,  attention/concentration intact, fund of knowledge appropriate    Cranial Nerves:     II:                         pupils equal, round and reactive to light, visual fields intact   III/ IV/VI:               extraocular movements intact, neg nystagmus, neg ptosis  V:                        facial sensation intact, V1-3 normal  VII:                      face symmetric, no droop, normal eye closure and smile  VIII:                     hearing intact to finger rub bilaterally  IX and X:             no hoarseness, gag intact, palate/ uvula rise symmetrically  XI:                       SCM/ trapezius strength intact bilateral  XII:                      no tongue deviation    Motor Exam:    Right UE:              no focal weakness > 3+/5                             pronator drift: neg    Left UE:                no focal weakness > 3+/5                             pronator drift: neg      Right LE:               no focal weakness > 3+/5    Left LE:                no focal weakness > 3+/5               Sensation:           intact to light touch x 4 extremities                                                DTR:                  biceps/brachioradialis: equal                                                   patella/ankle: equal                                                       neg clonus                           neg Babinski    Gait:  not tested        PM&R Impression:    1) s/p fall/ right hip contusion/ weight bearing as tolerated with devices  2) no focal weakness    Recommendations/ Plan :    1) Physical therapy focusing on therapeutic exercises, bed mobility/transfer out of bed evaluation, progressive ambulation with assistive devices prn.    2) Anticipated Disposition Plan/Recs:    subacute rehab placement                  
HPI:  HPI: 86y Female with PMH of breast cancer (s/p mastectomy in 2018, radiation in 2019), recent submassive PE (in 10/2021, on Eliquis) admitted after unwitnessed fall. Patient does not remember many details of fall. Fell on her right side as she got up from the the couch. Denies head trauma or losing consciousness but does not remember details of fall. Denies preceding dizziness, lightheadedness, chest pain, dyspnea, or palpitations. Caretaker found patient on the floor, incontinent and called ambulance. Patient can perform all ADLs and IADLs. Patient walks with the assistance of a walker.  In the ED:    - VS: Tmax: 97.9, HR: , BP: 124-145/81-96, RR: 16, O2: 95-98% on room air     - Pertinent Labs: WBC 8.20, Hb 12.4, Plt 331, Na 146, K 3.8, HCO3 18, AG 20, BUN 26, 0.99  lactate 2.3, t bili 1.8, AST/ALT 70/35, CK 2565    - Imaging: CT head with No intracranial hemorrhage or calvarial fracture. CT C-spine without fracture. CXR without infiltrates or consolidations. R hip X-ray showed valgus impacted femoral neck fracture    Pertinent Cardiac HPI: Pt with hx of recent subsegmental PE ( unclear if provoked). Pt is able to perform all ADL's. She walks with a walker. 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 She can walk a couple of blocks without any chest pain/ SOB/ DRIVER. She never has to stop due to any symptoms. She is able to cook for herself/ bathe herself and take care of her ADL's without any issues. She denies any dizziness/ lightheadedness/ syncope. Per pt, she was sitting on the couch. She was reading a book and then ended up falling. She did not have any energy to get up after and then was found by her caretaker prior to ED arrival. She denies loss of consciousness.     ROS: A 10-point review of systems was otherwise negative.    PAST MEDICAL & SURGICAL HISTORY:  Left breast mass    Breast cancer  Left    History of tonsillectomy    S/P left mastectomy        SOCIAL HISTORY:  FAMILY HISTORY:      ALLERGIES: 	  No Known Allergies            MEDICATIONS:  acetaminophen     Tablet .. 650 milliGRAM(s) Oral every 6 hours PRN  dextrose 40% Gel 15 Gram(s) Oral once  dextrose 5% + lactated ringers. 1000 milliLiter(s) IV Continuous <Continuous>  dextrose 5%. 1000 milliLiter(s) IV Continuous <Continuous>  dextrose 5%. 1000 milliLiter(s) IV Continuous <Continuous>  dextrose 50% Injectable 25 Gram(s) IV Push once  dextrose 50% Injectable 12.5 Gram(s) IV Push once  dextrose 50% Injectable 25 Gram(s) IV Push once  glucagon  Injectable 1 milliGRAM(s) IntraMuscular once  influenza  Vaccine (HIGH DOSE) 0.7 milliLiter(s) IntraMuscular once  insulin lispro (ADMELOG) corrective regimen sliding scale   SubCutaneous Before meals and at bedtime  magnesium sulfate  IVPB 2 Gram(s) IV Intermittent once  potassium chloride   Powder 40 milliEquivalent(s) Oral once      PHYSICAL EXAM:  T(C): 36.6 (11-06-21 @ 22:26), Max: 36.6 (11-06-21 @ 15:11)  HR: 70 (11-06-21 @ 21:46) (70 - 106)  BP: 131/61 (11-06-21 @ 19:48) (124/81 - 145/96)  RR: 17 (11-06-21 @ 21:46) (16 - 18)  SpO2: 96% (11-06-21 @ 21:46) (95% - 98%)  Wt(kg): --    GEN: Awake, comfortable. NAD.   HEENT: NCAT, PERRL, EOMI. Mucosa moist. No JVD. Bruising on R face  RESP: CTA b/l. No crackles on exam   CV: RRR, normal s1/s2. No m/r/g.  ABD: Soft, NTND. BS+  EXT: Warm. No edema, clubbing, or cyanosis.   NEURO: AAOx3. No focal deficits.    I&O's Summary    Height (cm): 167.6 (11-06 @ 15:11)  Weight (kg): 61.7 (11-06 @ 15:11)  BMI (kg/m2): 22 (11-06 @ 15:11)  BSA (m2): 1.7 (11-06 @ 15:11)  	  LABS:	 	    CARDIAC MARKERS:                                  9.5    x     )-----------( 238      ( 07 Nov 2021 02:09 )             29.1     11-06    144  |  111<H>  |  24<H>  ----------------------------<  99  3.3<L>   |  14<L>  |  0.83    Ca    8.5      06 Nov 2021 22:27  Mg     1.8     11-06    TPro  5.7<L>  /  Alb  2.8<L>  /  TBili  1.4<H>  /  DBili  x   /  AST  68<H>  /  ALT  31  /  AlkPhos  105  11-06    proBNP: Serum Pro-Brain Natriuretic Peptide: 22627 pg/mL (11-06 @ 22:27)    Lipid Profile:   HgA1c:   TSH: Thyroid Stimulating Hormone, Serum: 1.690 uIU/mL (11-06 @ 22:27)    CONCLUSIONS:     1. Normal left ventricular size and systolic function.   2. There is a septal knuckle measuring 1.3 cm without significant LVOT obstruction.   3. Normal right ventricular size and systolic function.   4. Normal atria.   5. No other significant valvular disease.   6. No evidence of pulmonary hypertension.   7. No pericardial effusion.   8. No prior echo is available for comparison.
Patient is a 86y old  Female who presents with a chief complaint of Unwitnessed Fall (07 Nov 2021 02:24)  HPI:  HPI: 86y Female with PMH of breast cancer (s/p mastectomy in 2018, radiation in 2019), recent submassive PE (in 10/2021, on Eliquis) admitted after unwitnessed fall. Patient does not remember many details of fall. Fell on her right side as she got up from the the couch. Denies head trauma or losing consciousness but does not remember details of fall. Denies preceding dizziness, lightheadedness, chest pain, dyspnea, or palpitations. Per caretaker, patient likely fell yesterday morning as she had not taken her pills or returned a check-in email. Caretaker found patient on the floor, incontinent and called ambulance. Patient reports that she feels generally more frail. Caretaker believes that patient has been very stressed and down when she had to renew her Green Card (she is from [place]) in addition to staying home during COVID. She feels as if patient has had more difficult of a time taking care of herself, even though at baseline, patient can perform all ADLs and IADLs. Patient walks with the assistance of a walker. Reports increasingly poor oral intake, though she eats a balanced diet including chicken, fish, and potatoes. Recently admitted to our instituion in 10/2021 for weakness found to have submassive PE and discharged on Eliquis. Denies fevers, chills, cough, chest pain, dyspnea, nausea, headache, diarrhea, sick contacts, change in urinary or bowel habits.    In the ED:    - VS: Tmax: 97.9, HR: , BP: 124-145/81-96, RR: 16, O2: 95-98% on room air     - Pertinent Labs: WBC 8.20, Hb 12.4, Plt 331, Na 146, K 3.8, HCO3 18, AG 20, BUN 26, 0.99  lactate 2.3, t bili 1.8, AST/ALT 70/35, CK 2565    - Imaging: CT head with No intracranial hemorrhage or calvarial fracture. CT C-spine without fracture. CXR without infiltrates or consolidations. R hip X-ray showed valgus impacted femoral neck fracture    - Tx/interventions: NS 1L x 2    - Consults: orthopedics (06 Nov 2021 18:27)    : Above noted. Urology consulted for difficult ramirez placement. Per primary team nursing and intern attempted to place a ramirez without success. Primary team sates patient requires ramirez due to elevated bladder scan.   Patient denies any acute complaints. Denies feeling the urge to urinate. Denies having a catheter in the past or seeing a urologist.   Vital Signs Last 24 Hrs  T(C): 36.6 (06 Nov 2021 22:26), Max: 36.6 (06 Nov 2021 15:11)  T(F): 97.8 (06 Nov 2021 22:26), Max: 97.9 (06 Nov 2021 15:11)  HR: 70 (06 Nov 2021 21:46) (70 - 106)  BP: 131/61 (06 Nov 2021 19:48) (124/81 - 145/96)  BP(mean): --  RR: 17 (06 Nov 2021 21:46) (16 - 18)  SpO2: 96% (06 Nov 2021 21:46) (95% - 98%)  I&O's Summary      PE:  Gen: alert and oriented. no acute distress   Abd: soft, non-tender, non-distended   : 12fr coude placed with clear, yellow urine return     LABS:                        9.5    11.64 )-----------( 238      ( 07 Nov 2021 02:09 )             29.1     11-06    144  |  111<H>  |  24<H>  ----------------------------<  99  3.3<L>   |  14<L>  |  0.83    Ca    8.5      06 Nov 2021 22:27  Mg     1.8     11-06    TPro  5.7<L>  /  Alb  2.8<L>  /  TBili  1.4<H>  /  DBili  x   /  AST  68<H>  /  ALT  31  /  AlkPhos  105  11-06    PT/INR - ( 07 Nov 2021 02:09 )   PT: 20.6 sec;   INR: 1.76          PTT - ( 07 Nov 2021 02:09 )  PTT:36.2 sec  Cultures      A/P

## 2021-11-08 NOTE — DIETITIAN INITIAL EVALUATION ADULT. - PROBLEM SELECTOR PLAN 7
Hx of breast cancer (s/p resection and axillary LN dissection). Follows with Dr. Sheridan.  - Continue to follow up as outpatient

## 2021-11-08 NOTE — PROGRESS NOTE ADULT - SUBJECTIVE AND OBJECTIVE BOX
CC: Patient is a 86y old  Female who presents with a chief complaint of Unwitnessed Fall (07 Nov 2021 08:07)      INTERVAL EVENTS: JANIE    SUBJECTIVE / INTERVAL HPI: Patient seen and examined at bedside.     ROS: negative unless otherwise stated above.    VITAL SIGNS:  Vital Signs Last 24 Hrs  T(C): 36.7 (08 Nov 2021 06:11), Max: 36.8 (07 Nov 2021 12:09)  T(F): 98.1 (08 Nov 2021 06:11), Max: 98.2 (07 Nov 2021 12:09)  HR: 84 (08 Nov 2021 06:11) (84 - 98)  BP: 105/63 (08 Nov 2021 06:11) (100/61 - 105/63)  BP(mean): --  RR: 18 (08 Nov 2021 06:11) (17 - 18)  SpO2: 98% (08 Nov 2021 06:11) (96% - 98%)      11-07-21 @ 07:01  -  11-08-21 @ 07:00  --------------------------------------------------------  IN: 0 mL / OUT: 200 mL / NET: -200 mL        PHYSICAL EXAM:    General: NAD  HEENT: MMM  Neck: supple  Cardiovascular: +S1/S2; RRR  Respiratory: CTA B/L; no W/R/R  Gastrointestinal: soft, NT/ND  Extremities: WWP; no edema, clubbing or cyanosis  Vascular: 2+ radial, DP/PT pulses B/L  Neurological: AAOx3; no focal deficits    MEDICATIONS:  MEDICATIONS  (STANDING):  apixaban 5 milliGRAM(s) Oral every 12 hours  cefTRIAXone   IVPB 1000 milliGRAM(s) IV Intermittent every 24 hours  dextrose 40% Gel 15 Gram(s) Oral once  dextrose 5%. 1000 milliLiter(s) (100 mL/Hr) IV Continuous <Continuous>  dextrose 5%. 1000 milliLiter(s) (50 mL/Hr) IV Continuous <Continuous>  dextrose 50% Injectable 25 Gram(s) IV Push once  dextrose 50% Injectable 12.5 Gram(s) IV Push once  dextrose 50% Injectable 25 Gram(s) IV Push once  folic acid 1 milliGRAM(s) Oral daily  glucagon  Injectable 1 milliGRAM(s) IntraMuscular once  influenza  Vaccine (HIGH DOSE) 0.7 milliLiter(s) IntraMuscular once  insulin lispro (ADMELOG) corrective regimen sliding scale   SubCutaneous Before meals and at bedtime  multivitamin 1 Tablet(s) Oral daily  polyethylene glycol 3350 17 Gram(s) Oral every 24 hours  povidone iodine 5% Nasal Swab 1 Application(s) Both Nostrils once  senna 2 Tablet(s) Oral at bedtime    MEDICATIONS  (PRN):  acetaminophen     Tablet .. 650 milliGRAM(s) Oral every 6 hours PRN Temp greater or equal to 38C (100.4F), Mild Pain (1 - 3)      ALLERGIES:  Allergies    No Known Allergies    Intolerances        LABS:                        9.8    15.57 )-----------( 253      ( 07 Nov 2021 15:01 )             30.7     11-07    142  |  113<H>  |  24<H>  ----------------------------<  150<H>  3.7   |  18<L>  |  0.75    Ca    8.6      07 Nov 2021 15:01  Phos  2.5     11-07  Mg     2.4     11-07    TPro  5.4<L>  /  Alb  2.5<L>  /  TBili  1.1  /  DBili  x   /  AST  52<H>  /  ALT  30  /  AlkPhos  96  11-07    PT/INR - ( 07 Nov 2021 08:21 )   PT: 18.8 sec;   INR: 1.60          PTT - ( 07 Nov 2021 08:21 )  PTT:30.1 sec  Urinalysis Basic - ( 07 Nov 2021 06:42 )    Color: Yellow / Appearance: Clear / SG: >=1.030 / pH: x  Gluc: x / Ketone: 15 mg/dL  / Bili: Moderate / Urobili: 1.0 E.U./dL   Blood: x / Protein: 30 mg/dL / Nitrite: POSITIVE   Leuk Esterase: NEGATIVE / RBC: 5-10 /HPF / WBC < 5 /HPF   Sq Epi: x / Non Sq Epi: 0-5 /HPF / Bacteria: Present /HPF      CAPILLARY BLOOD GLUCOSE      POCT Blood Glucose.: 153 mg/dL (07 Nov 2021 21:31)      RADIOLOGY & ADDITIONAL TESTS: Reviewed. CC: Patient is a 86y old  Female who presents with a chief complaint of Unwitnessed Fall (07 Nov 2021 08:07)      INTERVAL EVENTS: JANIE    SUBJECTIVE / INTERVAL HPI: Patient seen and examined at bedside. Patient has no complaints today. She does not have any pain. Has been drinking Ensure and eating a small amount of solid food. Denies discomfort with ramirez catheter. Has not had a BM since admission.     ROS: negative unless otherwise stated above.    VITAL SIGNS:  Vital Signs Last 24 Hrs  T(C): 36.7 (08 Nov 2021 06:11), Max: 36.8 (07 Nov 2021 12:09)  T(F): 98.1 (08 Nov 2021 06:11), Max: 98.2 (07 Nov 2021 12:09)  HR: 84 (08 Nov 2021 06:11) (84 - 98)  BP: 105/63 (08 Nov 2021 06:11) (100/61 - 105/63)  BP(mean): --  RR: 18 (08 Nov 2021 06:11) (17 - 18)  SpO2: 98% (08 Nov 2021 06:11) (96% - 98%)      11-07-21 @ 07:01  -  11-08-21 @ 07:00  --------------------------------------------------------  IN: 0 mL / OUT: 200 mL / NET: -200 mL        PHYSICAL EXAM:    General: NAD  HEENT: MMM  Neck: supple  Cardiovascular: +S1/S2; RRR  Respiratory: CTA B/L; no W/R/R  Gastrointestinal: soft, NT/ND  Extremities: WWP; no edema, clubbing or cyanosis  Vascular: 2+ radial, DP/PT pulses B/L  Neurological: AAOx3; no focal deficits  Skin: Ecchymosis on R upper eyelid     MEDICATIONS:  MEDICATIONS  (STANDING):  apixaban 5 milliGRAM(s) Oral every 12 hours  cefTRIAXone   IVPB 1000 milliGRAM(s) IV Intermittent every 24 hours  dextrose 40% Gel 15 Gram(s) Oral once  dextrose 5%. 1000 milliLiter(s) (100 mL/Hr) IV Continuous <Continuous>  dextrose 5%. 1000 milliLiter(s) (50 mL/Hr) IV Continuous <Continuous>  dextrose 50% Injectable 25 Gram(s) IV Push once  dextrose 50% Injectable 12.5 Gram(s) IV Push once  dextrose 50% Injectable 25 Gram(s) IV Push once  folic acid 1 milliGRAM(s) Oral daily  glucagon  Injectable 1 milliGRAM(s) IntraMuscular once  influenza  Vaccine (HIGH DOSE) 0.7 milliLiter(s) IntraMuscular once  insulin lispro (ADMELOG) corrective regimen sliding scale   SubCutaneous Before meals and at bedtime  multivitamin 1 Tablet(s) Oral daily  polyethylene glycol 3350 17 Gram(s) Oral every 24 hours  povidone iodine 5% Nasal Swab 1 Application(s) Both Nostrils once  senna 2 Tablet(s) Oral at bedtime    MEDICATIONS  (PRN):  acetaminophen     Tablet .. 650 milliGRAM(s) Oral every 6 hours PRN Temp greater or equal to 38C (100.4F), Mild Pain (1 - 3)      ALLERGIES:  Allergies    No Known Allergies    Intolerances        LABS:                        9.8    15.57 )-----------( 253      ( 07 Nov 2021 15:01 )             30.7     11-07    142  |  113<H>  |  24<H>  ----------------------------<  150<H>  3.7   |  18<L>  |  0.75    Ca    8.6      07 Nov 2021 15:01  Phos  2.5     11-07  Mg     2.4     11-07    TPro  5.4<L>  /  Alb  2.5<L>  /  TBili  1.1  /  DBili  x   /  AST  52<H>  /  ALT  30  /  AlkPhos  96  11-07    PT/INR - ( 07 Nov 2021 08:21 )   PT: 18.8 sec;   INR: 1.60          PTT - ( 07 Nov 2021 08:21 )  PTT:30.1 sec  Urinalysis Basic - ( 07 Nov 2021 06:42 )    Color: Yellow / Appearance: Clear / SG: >=1.030 / pH: x  Gluc: x / Ketone: 15 mg/dL  / Bili: Moderate / Urobili: 1.0 E.U./dL   Blood: x / Protein: 30 mg/dL / Nitrite: POSITIVE   Leuk Esterase: NEGATIVE / RBC: 5-10 /HPF / WBC < 5 /HPF   Sq Epi: x / Non Sq Epi: 0-5 /HPF / Bacteria: Present /HPF      CAPILLARY BLOOD GLUCOSE      POCT Blood Glucose.: 153 mg/dL (07 Nov 2021 21:31)      RADIOLOGY & ADDITIONAL TESTS: Reviewed.

## 2021-11-09 LAB
ALBUMIN SERPL ELPH-MCNC: 2.2 G/DL — LOW (ref 3.3–5)
ALBUMIN SERPL ELPH-MCNC: 2.4 G/DL — LOW (ref 3.3–5)
ALP SERPL-CCNC: 162 U/L — HIGH (ref 40–120)
ALP SERPL-CCNC: 90 U/L — SIGNIFICANT CHANGE UP (ref 40–120)
ALT FLD-CCNC: 27 U/L — SIGNIFICANT CHANGE UP (ref 10–45)
ALT FLD-CCNC: 33 U/L — SIGNIFICANT CHANGE UP (ref 10–45)
ANION GAP SERPL CALC-SCNC: 6 MMOL/L — SIGNIFICANT CHANGE UP (ref 5–17)
ANION GAP SERPL CALC-SCNC: 8 MMOL/L — SIGNIFICANT CHANGE UP (ref 5–17)
ANISOCYTOSIS BLD QL: SLIGHT — SIGNIFICANT CHANGE UP
ANISOCYTOSIS BLD QL: SLIGHT — SIGNIFICANT CHANGE UP
AST SERPL-CCNC: 37 U/L — SIGNIFICANT CHANGE UP (ref 10–40)
AST SERPL-CCNC: 41 U/L — HIGH (ref 10–40)
BASOPHILS # BLD AUTO: 0 K/UL — SIGNIFICANT CHANGE UP (ref 0–0.2)
BASOPHILS # BLD AUTO: 0 K/UL — SIGNIFICANT CHANGE UP (ref 0–0.2)
BASOPHILS NFR BLD AUTO: 0 % — SIGNIFICANT CHANGE UP (ref 0–2)
BASOPHILS NFR BLD AUTO: 0 % — SIGNIFICANT CHANGE UP (ref 0–2)
BILIRUB SERPL-MCNC: 0.6 MG/DL — SIGNIFICANT CHANGE UP (ref 0.2–1.2)
BILIRUB SERPL-MCNC: 0.7 MG/DL — SIGNIFICANT CHANGE UP (ref 0.2–1.2)
BLD GP AB SCN SERPL QL: NEGATIVE — SIGNIFICANT CHANGE UP
BUN SERPL-MCNC: 20 MG/DL — SIGNIFICANT CHANGE UP (ref 7–23)
BUN SERPL-MCNC: 24 MG/DL — HIGH (ref 7–23)
BURR CELLS BLD QL SMEAR: PRESENT — SIGNIFICANT CHANGE UP
CALCIUM SERPL-MCNC: 8.1 MG/DL — LOW (ref 8.4–10.5)
CALCIUM SERPL-MCNC: 8.1 MG/DL — LOW (ref 8.4–10.5)
CHLORIDE SERPL-SCNC: 110 MMOL/L — HIGH (ref 96–108)
CHLORIDE SERPL-SCNC: 111 MMOL/L — HIGH (ref 96–108)
CO2 SERPL-SCNC: 22 MMOL/L — SIGNIFICANT CHANGE UP (ref 22–31)
CO2 SERPL-SCNC: 24 MMOL/L — SIGNIFICANT CHANGE UP (ref 22–31)
CREAT SERPL-MCNC: 0.63 MG/DL — SIGNIFICANT CHANGE UP (ref 0.5–1.3)
CREAT SERPL-MCNC: 0.67 MG/DL — SIGNIFICANT CHANGE UP (ref 0.5–1.3)
EOSINOPHIL # BLD AUTO: 0 K/UL — SIGNIFICANT CHANGE UP (ref 0–0.5)
EOSINOPHIL # BLD AUTO: 0 K/UL — SIGNIFICANT CHANGE UP (ref 0–0.5)
EOSINOPHIL NFR BLD AUTO: 0 % — SIGNIFICANT CHANGE UP (ref 0–6)
EOSINOPHIL NFR BLD AUTO: 0 % — SIGNIFICANT CHANGE UP (ref 0–6)
FERRITIN SERPL-MCNC: 512 NG/ML — HIGH (ref 15–150)
GIANT PLATELETS BLD QL SMEAR: PRESENT — SIGNIFICANT CHANGE UP
GIANT PLATELETS BLD QL SMEAR: PRESENT — SIGNIFICANT CHANGE UP
GLUCOSE BLDC GLUCOMTR-MCNC: 104 MG/DL — HIGH (ref 70–99)
GLUCOSE BLDC GLUCOMTR-MCNC: 131 MG/DL — HIGH (ref 70–99)
GLUCOSE BLDC GLUCOMTR-MCNC: 134 MG/DL — HIGH (ref 70–99)
GLUCOSE BLDC GLUCOMTR-MCNC: 135 MG/DL — HIGH (ref 70–99)
GLUCOSE BLDC GLUCOMTR-MCNC: 94 MG/DL — SIGNIFICANT CHANGE UP (ref 70–99)
GLUCOSE SERPL-MCNC: 125 MG/DL — HIGH (ref 70–99)
GLUCOSE SERPL-MCNC: 87 MG/DL — SIGNIFICANT CHANGE UP (ref 70–99)
HCT VFR BLD CALC: 24.2 % — LOW (ref 34.5–45)
HCT VFR BLD CALC: 28.1 % — LOW (ref 34.5–45)
HGB BLD-MCNC: 7.8 G/DL — LOW (ref 11.5–15.5)
HGB BLD-MCNC: 9 G/DL — LOW (ref 11.5–15.5)
HYPOCHROMIA BLD QL: SLIGHT — SIGNIFICANT CHANGE UP
HYPOCHROMIA BLD QL: SLIGHT — SIGNIFICANT CHANGE UP
IRON SATN MFR SERPL: 44 % — SIGNIFICANT CHANGE UP (ref 14–50)
IRON SATN MFR SERPL: 51 UG/DL — SIGNIFICANT CHANGE UP (ref 30–160)
LYMPHOCYTES # BLD AUTO: 0.42 K/UL — LOW (ref 1–3.3)
LYMPHOCYTES # BLD AUTO: 0.5 K/UL — LOW (ref 1–3.3)
LYMPHOCYTES # BLD AUTO: 6.1 % — LOW (ref 13–44)
LYMPHOCYTES # BLD AUTO: 7 % — LOW (ref 13–44)
MACROCYTES BLD QL: SLIGHT — SIGNIFICANT CHANGE UP
MAGNESIUM SERPL-MCNC: 2 MG/DL — SIGNIFICANT CHANGE UP (ref 1.6–2.6)
MAGNESIUM SERPL-MCNC: 2.1 MG/DL — SIGNIFICANT CHANGE UP (ref 1.6–2.6)
MANUAL SMEAR VERIFICATION: SIGNIFICANT CHANGE UP
MANUAL SMEAR VERIFICATION: SIGNIFICANT CHANGE UP
MCHC RBC-ENTMCNC: 27.9 PG — SIGNIFICANT CHANGE UP (ref 27–34)
MCHC RBC-ENTMCNC: 28.2 PG — SIGNIFICANT CHANGE UP (ref 27–34)
MCHC RBC-ENTMCNC: 32 GM/DL — SIGNIFICANT CHANGE UP (ref 32–36)
MCHC RBC-ENTMCNC: 32.2 GM/DL — SIGNIFICANT CHANGE UP (ref 32–36)
MCV RBC AUTO: 87 FL — SIGNIFICANT CHANGE UP (ref 80–100)
MCV RBC AUTO: 87.4 FL — SIGNIFICANT CHANGE UP (ref 80–100)
MICROCYTES BLD QL: SLIGHT — SIGNIFICANT CHANGE UP
MONOCYTES # BLD AUTO: 0.06 K/UL — SIGNIFICANT CHANGE UP (ref 0–0.9)
MONOCYTES # BLD AUTO: 0.12 K/UL — SIGNIFICANT CHANGE UP (ref 0–0.9)
MONOCYTES NFR BLD AUTO: 0.9 % — LOW (ref 2–14)
MONOCYTES NFR BLD AUTO: 1.8 % — LOW (ref 2–14)
NEUTROPHILS # BLD AUTO: 6.35 K/UL — SIGNIFICANT CHANGE UP (ref 1.8–7.4)
NEUTROPHILS # BLD AUTO: 6.54 K/UL — SIGNIFICANT CHANGE UP (ref 1.8–7.4)
NEUTROPHILS NFR BLD AUTO: 88.6 % — HIGH (ref 43–77)
NEUTROPHILS NFR BLD AUTO: 92.1 % — HIGH (ref 43–77)
NEUTS BAND # BLD: 3.5 % — SIGNIFICANT CHANGE UP (ref 0–8)
OVALOCYTES BLD QL SMEAR: SLIGHT — SIGNIFICANT CHANGE UP
PHOSPHATE SERPL-MCNC: 2 MG/DL — LOW (ref 2.5–4.5)
PHOSPHATE SERPL-MCNC: 3.8 MG/DL — SIGNIFICANT CHANGE UP (ref 2.5–4.5)
PLAT MORPH BLD: ABNORMAL
PLAT MORPH BLD: ABNORMAL
PLATELET # BLD AUTO: 193 K/UL — SIGNIFICANT CHANGE UP (ref 150–400)
PLATELET # BLD AUTO: 224 K/UL — SIGNIFICANT CHANGE UP (ref 150–400)
POIKILOCYTOSIS BLD QL AUTO: SLIGHT — SIGNIFICANT CHANGE UP
POIKILOCYTOSIS BLD QL AUTO: SLIGHT — SIGNIFICANT CHANGE UP
POLYCHROMASIA BLD QL SMEAR: SLIGHT — SIGNIFICANT CHANGE UP
POTASSIUM SERPL-MCNC: 3.6 MMOL/L — SIGNIFICANT CHANGE UP (ref 3.5–5.3)
POTASSIUM SERPL-MCNC: 4.3 MMOL/L — SIGNIFICANT CHANGE UP (ref 3.5–5.3)
POTASSIUM SERPL-SCNC: 3.6 MMOL/L — SIGNIFICANT CHANGE UP (ref 3.5–5.3)
POTASSIUM SERPL-SCNC: 4.3 MMOL/L — SIGNIFICANT CHANGE UP (ref 3.5–5.3)
PROT SERPL-MCNC: 4.7 G/DL — LOW (ref 6–8.3)
PROT SERPL-MCNC: 5.4 G/DL — LOW (ref 6–8.3)
RBC # BLD: 2.77 M/UL — LOW (ref 3.8–5.2)
RBC # BLD: 3.23 M/UL — LOW (ref 3.8–5.2)
RBC # BLD: 3.23 M/UL — LOW (ref 3.8–5.2)
RBC # FLD: 19.1 % — HIGH (ref 10.3–14.5)
RBC # FLD: 19.2 % — HIGH (ref 10.3–14.5)
RBC BLD AUTO: ABNORMAL
RBC BLD AUTO: ABNORMAL
RETICS #: 44.6 K/UL — SIGNIFICANT CHANGE UP (ref 25–125)
RETICS/RBC NFR: 1.4 % — SIGNIFICANT CHANGE UP (ref 0.5–2.5)
RH IG SCN BLD-IMP: POSITIVE — SIGNIFICANT CHANGE UP
SODIUM SERPL-SCNC: 139 MMOL/L — SIGNIFICANT CHANGE UP (ref 135–145)
SODIUM SERPL-SCNC: 142 MMOL/L — SIGNIFICANT CHANGE UP (ref 135–145)
SPHEROCYTES BLD QL SMEAR: SLIGHT — SIGNIFICANT CHANGE UP
SPHEROCYTES BLD QL SMEAR: SLIGHT — SIGNIFICANT CHANGE UP
TIBC SERPL-MCNC: 115 UG/DL — LOW (ref 220–430)
UIBC SERPL-MCNC: 64 UG/DL — LOW (ref 110–370)
WBC # BLD: 6.89 K/UL — SIGNIFICANT CHANGE UP (ref 3.8–10.5)
WBC # BLD: 7.1 K/UL — SIGNIFICANT CHANGE UP (ref 3.8–10.5)
WBC # FLD AUTO: 6.89 K/UL — SIGNIFICANT CHANGE UP (ref 3.8–10.5)
WBC # FLD AUTO: 7.1 K/UL — SIGNIFICANT CHANGE UP (ref 3.8–10.5)

## 2021-11-09 PROCEDURE — 99233 SBSQ HOSP IP/OBS HIGH 50: CPT | Mod: GC

## 2021-11-09 RX ORDER — SENNA PLUS 8.6 MG/1
2 TABLET ORAL
Qty: 0 | Refills: 0 | DISCHARGE
Start: 2021-11-09

## 2021-11-09 RX ORDER — FOLIC ACID 0.8 MG
1 TABLET ORAL
Qty: 0 | Refills: 0 | DISCHARGE
Start: 2021-11-09

## 2021-11-09 RX ORDER — POTASSIUM PHOSPHATE, MONOBASIC POTASSIUM PHOSPHATE, DIBASIC 236; 224 MG/ML; MG/ML
30 INJECTION, SOLUTION INTRAVENOUS ONCE
Refills: 0 | Status: COMPLETED | OUTPATIENT
Start: 2021-11-09 | End: 2021-11-09

## 2021-11-09 RX ORDER — POLYETHYLENE GLYCOL 3350 17 G/17G
17 POWDER, FOR SOLUTION ORAL
Qty: 0 | Refills: 0 | DISCHARGE
Start: 2021-11-09

## 2021-11-09 RX ORDER — APIXABAN 2.5 MG/1
1 TABLET, FILM COATED ORAL
Qty: 0 | Refills: 0 | DISCHARGE
Start: 2021-11-09

## 2021-11-09 RX ADMIN — Medication 1 TABLET(S): at 11:54

## 2021-11-09 RX ADMIN — APIXABAN 5 MILLIGRAM(S): 2.5 TABLET, FILM COATED ORAL at 05:54

## 2021-11-09 RX ADMIN — CEFTRIAXONE 100 MILLIGRAM(S): 500 INJECTION, POWDER, FOR SOLUTION INTRAMUSCULAR; INTRAVENOUS at 11:54

## 2021-11-09 RX ADMIN — Medication 1 MILLIGRAM(S): at 11:53

## 2021-11-09 RX ADMIN — APIXABAN 5 MILLIGRAM(S): 2.5 TABLET, FILM COATED ORAL at 17:31

## 2021-11-09 RX ADMIN — POTASSIUM PHOSPHATE, MONOBASIC POTASSIUM PHOSPHATE, DIBASIC 85 MILLIMOLE(S): 236; 224 INJECTION, SOLUTION INTRAVENOUS at 07:46

## 2021-11-09 NOTE — PROGRESS NOTE ADULT - SUBJECTIVE AND OBJECTIVE BOX
Patient was seen and examined by me at bedside. I agree with resident's note, subjective, objective physical exam, assessment and plan with following modifications/additions.    Greater than 35 minutes spent on total encounter; more than 50% of the visit was spent counseling and/or coordinating care by the attending physician.    86y Female with PMH of breast cancer (s/p mastectomy in 2018, radiation in 2019) and recent submassive PE (in 10/2021, on Eliquis) admitted after unwitnessed fall no fracture on CT, course c/b sepsis presumed 2/2 UTI (present on admission)  #Anemia- rectal neg for melena/blood, f/u iron studies, downtrending from Oct 10's to largely 9's here (recheck this am level as all lines down), low c/f RP bleed, more likely slowly GI bleed since being on eliquis and consider ppi on dc   #Sepsis (bandemia, tachycardia) 2/2 UTI on admission now resolved, neg cx data- stop abx at this time  #Urinary retention- failed TOV- replace ramirez as pt cannot straight cath and difficulty and likely wants to go home,  f/u this Fri   #S/p fall, refusing [facility] despite PT eval- with capacity, will have to go home   #PE hx- c/w sada Desir MD [phone]    Patient was seen and examined by me at bedside. I agree with resident's note, subjective, objective physical exam, assessment and plan with following modifications/additions.    Greater than 35 minutes spent on total encounter; more than 50% of the visit was spent counseling and/or coordinating care by the attending physician.    86y Female with PMH of breast cancer (s/p mastectomy in 2018, radiation in 2019) and recent submassive PE (in 10/2021, on Eliquis) admitted after unwitnessed fall no fracture on CT, course c/b sepsis presumed 2/2 UTI (present on admission)  #Anemia- rectal neg for melena/blood yest, f/u iron studies, downtrending from Oct 10's to largely 9's here (recheck this am level as all lines down), low c/f RP bleed clinically, more likely slowly GI bleed since being on eliquis and consider ppi on dc and close PCP f/u for GI referral   #Sepsis (bandemia, tachycardia) 2/2 UTI on admission now resolved, neg cx data- stop abx at this time  #Urinary retention- failed TOV- replace ramirez as pt cannot straight cath and difficulty and likely wants to go home,  f/u this Fri   #S/p fall, refusing [facility] despite PT eval- with capacity, will have to go home   #PE hx- c/w sada Desir MD [phone]    Patient was seen and examined by me at bedside. I agree with resident's note, subjective, objective physical exam, assessment and plan with following modifications/additions.    Greater than 35 minutes spent on total encounter; more than 50% of the visit was spent counseling and/or coordinating care by the attending physician.    86y Female with PMH of breast cancer (s/p mastectomy in 2018, radiation in 2019) and recent submassive PE (in 10/2021, on Eliquis) admitted after unwitnessed fall no fracture on CT, course c/b sepsis presumed 2/2 UTI (present on admission)  #Anemia- rectal neg for melena/blood yest, f/u iron studies, downtrending from Oct 10's to largely 9's here stable throughout admit (recheck this am level as all lines down), low c/f RP bleed clinically and pt appears well- likely outpt f/u on Hg and GI referral if slow bleed on eliquis   #Sepsis (bandemia, tachycardia) 2/2 UTI on admission now resolved, neg cx data- stop abx at this time  #Urinary retention- failed TOV- replace ramirez as pt cannot straight cath and difficulty and likely wants to go home,  f/u this Fri   #S/p fall, refusing [facility] despite PT eval- with capacity, will have to go home   #PE hx- c/w sada Desir MD [phone]

## 2021-11-09 NOTE — PROGRESS NOTE ADULT - PROBLEM SELECTOR PLAN 2
Pt has bandemia and leukocytosis. She is afebrile, no tachycardia or tachypnea however she did have a bp of 93/59 at one point. Pt also has a starvation ketosis and lactic acidosis.   - UA has bacteria and nitrites, no leuks <5WBC  - Urine culture- insignificant growth    PLAN:  - treat for sepsis 2/2 UTI  - CTX 1g qd  - s/p 2L IVF  - now on maintenance D5LR  - monitor Gap and acidosis Pt has bandemia and leukocytosis. She is afebrile, no tachycardia or tachypnea however she did have a bp of 93/59 at one point. Pt also has a starvation ketosis and lactic acidosis.   - UA has bacteria and nitrites, no leuks <5WBC  - Urine culture- insignificant growth    PLAN:  - treated for sepsis but now resolved  - d/c'ed antibiotics   - s/p 4L IVF  - off fluids   - monitor Gap and acidosis

## 2021-11-09 NOTE — PROGRESS NOTE ADULT - PROBLEM SELECTOR PLAN 1
Unwitnessed fall at home likely yesterday morning. Denies preceding lightheadedness, dizziness, or palpitations. Memory of event is limited but denies LOC. CTH and C-spine without bleed or fractures.    - F/u ECG  - cardiac enzymes peaked and now downtrending - likely due to rhabdo and demand ischemia from being down  - pro-BNP elevated however euvolemic to hypovolemic on exam  - TSH - wnl  - Vitamin B12- wnl  - Folate- low  - Orthostatics when able to stand  - CT abd/pelvis confirmed no hip fracture just soft tissue injury Unwitnessed fall at home likely yesterday morning. Denies preceding lightheadedness, dizziness, or palpitations. Memory of event is limited but denies LOC. CTH and C-spine without bleed or fractures.    - F/u ECG  - cardiac enzymes peaked and now downtrending - likely due to rhabdo and demand ischemia from being down  - pro-BNP elevated however euvolemic to hypovolemic on exam  - TSH - wnl  - Vitamin B12- wnl  - Folate- low on supplementation  - Orthostatics when able to stand  - CT abd/pelvis confirmed no hip fracture just soft tissue injury

## 2021-11-09 NOTE — PROGRESS NOTE ADULT - PROBLEM SELECTOR PLAN 8
Bladder scan showed >300cc in bladder. urology consulted for difficult straight cath. ramirez was placed by urology.  - UA - + for nitrites and bacteria, - for leuk esterases and wbc  - denies dysuria however did have urinary retention on bladder scan - 300 UOP after ramirez placement by Urology  - Urine culture- no significant growth RESOLVED I/s/o recent fall. Lactic acidosis to 2.9 and CK elevated at 2565. S/p 2L NS.  -mild CK, Lfts  - stopped trending CK - downtrending  - stopped fluids

## 2021-11-09 NOTE — PROGRESS NOTE ADULT - PROBLEM SELECTOR PLAN 7
I/s/o recent fall. Lactic acidosis to 2.9 and CK elevated at 2565. S/p 2L NS.  -mild CK, Lfts  - Trend CK - downtrending  - maintenance fluids as above    #Troponemia: denies CP/sob. in setting of fall and rhabdo. EKG w/o ischemic changes  - downtrending trops now   - no need to continue to trend  - likely demand and rhabdo RESOLVED AG to 20 with lactate to 2.9 and likely starvation ketosis. elevated bhb   - s/p 4L   - lactate now wnl  - AG has now closed  - Bicarb improving  - may improve with diet as well

## 2021-11-09 NOTE — PROGRESS NOTE ADULT - PROBLEM SELECTOR PLAN 9
Found to have DVT/PE on last admission in 10/2021. On Eliquis 5 mg bid home med.  -Restarted Eliquis 5 mg BID  - pulm consult/clearance given recent DVT and PEs    #afib on admission  - EKG showed afib - already on eliquis  - normal HR now  - will monitor  - will repeat EKG today.

## 2021-11-09 NOTE — PROGRESS NOTE ADULT - SUBJECTIVE AND OBJECTIVE BOX
CC: Patient is a 86y old  Female who presents with a chief complaint of Unwitnessed Fall (08 Nov 2021 15:33)      INTERVAL EVENTS: Failed TOV - required straight cath after bladder scan showed 400cc and 700cc came out on straight cath.    SUBJECTIVE / INTERVAL HPI: Patient seen and examined at bedside.     ROS: negative unless otherwise stated above.    VITAL SIGNS:  Vital Signs Last 24 Hrs  T(C): 37.1 (09 Nov 2021 06:49), Max: 37.1 (09 Nov 2021 06:49)  T(F): 98.8 (09 Nov 2021 06:49), Max: 98.8 (09 Nov 2021 06:49)  HR: 78 (09 Nov 2021 06:49) (72 - 113)  BP: 102/62 (09 Nov 2021 06:49) (92/62 - 106/65)  BP(mean): 72 (08 Nov 2021 10:45) (72 - 72)  RR: 18 (09 Nov 2021 06:49) (17 - 18)  SpO2: 95% (09 Nov 2021 06:49) (95% - 98%)      11-08-21 @ 07:01  -  11-09-21 @ 07:00  --------------------------------------------------------  IN: 550 mL / OUT: 1200 mL / NET: -650 mL        PHYSICAL EXAM:    General: NAD  HEENT: MMM  Neck: supple  Cardiovascular: +S1/S2; RRR  Respiratory: CTA B/L; no W/R/R  Gastrointestinal: soft, NT/ND  Extremities: WWP; no edema, clubbing or cyanosis  Vascular: 2+ radial, DP/PT pulses B/L  Neurological: AAOx3; no focal deficits    MEDICATIONS:  MEDICATIONS  (STANDING):  apixaban 5 milliGRAM(s) Oral every 12 hours  cefTRIAXone   IVPB 1000 milliGRAM(s) IV Intermittent every 24 hours  dextrose 40% Gel 15 Gram(s) Oral once  dextrose 5%. 1000 milliLiter(s) (100 mL/Hr) IV Continuous <Continuous>  dextrose 5%. 1000 milliLiter(s) (50 mL/Hr) IV Continuous <Continuous>  dextrose 50% Injectable 25 Gram(s) IV Push once  dextrose 50% Injectable 12.5 Gram(s) IV Push once  dextrose 50% Injectable 25 Gram(s) IV Push once  folic acid 1 milliGRAM(s) Oral daily  glucagon  Injectable 1 milliGRAM(s) IntraMuscular once  influenza  Vaccine (HIGH DOSE) 0.7 milliLiter(s) IntraMuscular once  insulin lispro (ADMELOG) corrective regimen sliding scale   SubCutaneous Before meals and at bedtime  multivitamin 1 Tablet(s) Oral daily  polyethylene glycol 3350 17 Gram(s) Oral every 12 hours  potassium phosphate IVPB 30 milliMole(s) IV Intermittent once  povidone iodine 5% Nasal Swab 1 Application(s) Both Nostrils once  senna 2 Tablet(s) Oral at bedtime    MEDICATIONS  (PRN):  acetaminophen     Tablet .. 650 milliGRAM(s) Oral every 6 hours PRN Temp greater or equal to 38C (100.4F), Mild Pain (1 - 3)      ALLERGIES:  Allergies    No Known Allergies    Intolerances        LABS:                        7.8    6.89  )-----------( 193      ( 09 Nov 2021 06:13 )             24.2     11-09    139  |  111<H>  |  24<H>  ----------------------------<  87  3.6   |  22  |  0.67    Ca    8.1<L>      09 Nov 2021 06:13  Phos  2.0     11-09  Mg     2.0     11-09    TPro  4.7<L>  /  Alb  2.2<L>  /  TBili  0.7  /  DBili  x   /  AST  37  /  ALT  27  /  AlkPhos  90  11-09    PT/INR - ( 07 Nov 2021 08:21 )   PT: 18.8 sec;   INR: 1.60          PTT - ( 07 Nov 2021 08:21 )  PTT:30.1 sec    CAPILLARY BLOOD GLUCOSE      POCT Blood Glucose.: 104 mg/dL (08 Nov 2021 22:23)      RADIOLOGY & ADDITIONAL TESTS: Reviewed.

## 2021-11-09 NOTE — PROCEDURE NOTE - NSURITECHNIQUE_GU_A_CORE
Proper hand hygiene was performed/Sterile gloves were worn for the duration of the procedure/A sterile drape was used to cover all adjacent areas/The catheter was appropriately lubricated/The catheter was secured with a securement device (e.g. StatLock)/The urinary drainage system is closed at the end of the procedure/The collection bag is below the level of the patient and urinary bladder/All applicable medical record documentation is completed
Proper hand hygiene was performed/Sterile gloves were worn for the duration of the procedure/A sterile drape was used to cover all adjacent areas/The site was cleaned with soap/water and sterile solution (betadine)/The catheter was appropriately lubricated/The catheter was secured with a securement device (e.g. StatLock)/The urinary drainage system is closed at the end of the procedure/The collection bag is below the level of the patient and urinary bladder/All applicable medical record documentation is completed

## 2021-11-09 NOTE — PROGRESS NOTE ADULT - PROBLEM SELECTOR PLAN 5
Afebrile, hemodynamically stable. No cough, h/a, or urinary symptoms. Now has leukocytosis and bandemia on labs. CXR w/o infiltrate. Treat for UTI as above  - F/u BCx  - UA - + for nitrites and bacteria, - for leuk esterases and wbc  - Urine culture- no significant growth  - denies dysuria however did have urinary retention on bladder scan - 300 UOP after ramirez placement by Urology CT shows increased stool burden. No impaction or obstruction. May be cause of urinary retention and UTI    PLAN:  - will start bowel regimen - senna and miralax

## 2021-11-09 NOTE — PROGRESS NOTE ADULT - ASSESSMENT
86y Female with PMH of breast cancer (s/p mastectomy in 2018, radiation in 2019) and recent submassive PE (in 10/2021, on Eliquis) admitted after unwitnessed fall. Found to have questionable R hip fracture; CT and Xray are negative for fracture.

## 2021-11-09 NOTE — PROCEDURE NOTE - NSPROCDETAILS_GEN_ALL_CORE
sterile technique, indwelling urinary device inserted
sterile technique, indwelling urinary device inserted/a urinary catheter insertion kit was used for all insertion materials

## 2021-11-09 NOTE — PROGRESS NOTE ADULT - PROBLEM SELECTOR PLAN 12
F: s/p 2L NS, now on D5LR at 100cc/hr  E: Replete K<4, Mg<2  N: Regular diet; make NPO prior to orthopedic surgery  VTE Ppx: Eliquis 5 mg BID  GI: not needed  C: Full Code

## 2021-11-09 NOTE — PROGRESS NOTE ADULT - PROBLEM SELECTOR PLAN 4
Pt came in with a Hgb of 12.4 (baseline hgb around 10-10.5). Hgb now 8.6. No signs of blood loss/pooling in pelvis on CT. Likely concentrated on admission and repeat 9.5 was dilutional after receiving 2L of IVF. However will continue to monitor closely.  - vitals have been stable  - Hemolysis less likely: elevated LDH to 429 but normal haptoglobin and normal bilirubin     PLAN:  - monitor CBC  - monitor HR, BP closely  - maintain active t&s Pt came in with a Hgb of 12.4 (baseline hgb around 10-10.5). Hgb now 7.8. No signs of blood loss/pooling in pelvis on CT. Likely concentrated on admission and repeat 9.5 was dilutional after receiving 2L of IVF. However will continue to monitor closely.  - vitals have been stable but BP always runs around 100/60  - Hemolysis less likely: elevated LDH to 429 but normal haptoglobin and normal bilirubin     PLAN:  - monitor CBC  - f/u repeat labs  - monitor HR, BP closely  - maintain active t&s

## 2021-11-09 NOTE — PROGRESS NOTE ADULT - PROBLEM SELECTOR PLAN 6
AG to 20 with lactate to 2.9 and likely starvation ketosis. elevated bhb   - c/w d5/lr at 100cc/hr  - Trend lactate - downtrended  - AG has now closed  - Bicarb improving  - may improve with diet as well  - frequent lung checks while on fluids RESOLVED. Afebrile, hemodynamically stable. No cough, h/a, or urinary symptoms. Now has leukocytosis and bandemia on labs. CXR w/o infiltrate.   - resolved now with CTX  - unclear source since UTI unlikely

## 2021-11-09 NOTE — PROCEDURE NOTE - NSINDICATIONS_GEN_A_CORE
prolongerd immobilzation/sedation/strict intake/output during critical illness/urinry obstruction or retention
bladder distention

## 2021-11-10 VITALS
OXYGEN SATURATION: 96 % | RESPIRATION RATE: 15 BRPM | TEMPERATURE: 97 F | DIASTOLIC BLOOD PRESSURE: 59 MMHG | SYSTOLIC BLOOD PRESSURE: 99 MMHG | HEART RATE: 96 BPM

## 2021-11-10 DIAGNOSIS — R11.2 NAUSEA WITH VOMITING, UNSPECIFIED: ICD-10-CM

## 2021-11-10 LAB
GLUCOSE BLDC GLUCOMTR-MCNC: 106 MG/DL — HIGH (ref 70–99)
GLUCOSE BLDC GLUCOMTR-MCNC: 98 MG/DL — SIGNIFICANT CHANGE UP (ref 70–99)

## 2021-11-10 PROCEDURE — 99232 SBSQ HOSP IP/OBS MODERATE 35: CPT

## 2021-11-10 RX ADMIN — APIXABAN 5 MILLIGRAM(S): 2.5 TABLET, FILM COATED ORAL at 05:47

## 2021-11-10 NOTE — PROGRESS NOTE ADULT - PROVIDER SPECIALTY LIST ADULT
Hospitalist
Hospitalist
Orthopedics
Internal Medicine
Orthopedics
Cardiology
Internal Medicine

## 2021-11-10 NOTE — PROGRESS NOTE ADULT - REASON FOR ADMISSION
Unwitnessed Fall

## 2021-11-10 NOTE — PROGRESS NOTE ADULT - PROBLEM SELECTOR PLAN 6
CT shows increased stool burden. No impaction or obstruction. May be cause of urinary retention and UTI    PLAN:  - will start bowel regimen - senna and miralax

## 2021-11-10 NOTE — PROGRESS NOTE ADULT - PROBLEM SELECTOR PLAN 8
RESOLVED AG to 20 with lactate to 2.9 and likely starvation ketosis. elevated bhb   - s/p 4L   - lactate now wnl  - AG has now closed  - Bicarb improving  - may improve with diet as well

## 2021-11-10 NOTE — PROGRESS NOTE ADULT - PROBLEM SELECTOR PLAN 4
Pt had an episode of nausea and vomiting this AM on 11/10. Since then has not had much appetite and has been more lethargic    PLAN:  - po challenge today  - start PPI 40mg qd  - continue to monitor

## 2021-11-10 NOTE — PROGRESS NOTE ADULT - PROBLEM SELECTOR PLAN 7
RESOLVED. Afebrile, hemodynamically stable. No cough, h/a, or urinary symptoms. Now has leukocytosis and bandemia on labs. CXR w/o infiltrate.   - resolved now with CTX  - unclear source since UTI unlikely

## 2021-11-10 NOTE — PROGRESS NOTE ADULT - ATTENDING COMMENTS
Discharge planning to [facility]
Patient seen and examined at bedside on 11/7 at 10 am - has no acute complaints, agree with exam as above  -CT of hip w/o evidence of fracture - no plan for OR  -Suspect pt is septic from UTI (meets 2/4 SIRS criteria - bandemia, HR > 90) w/ positive U/A - start tx w/ CTX; f/u blood and urine cultures  -Trend HgB - if stable can restart Eliquis  -PT eval

## 2021-11-10 NOTE — PROGRESS NOTE ADULT - SUBJECTIVE AND OBJECTIVE BOX
CC: Patient is a 86y old  Female who presents with a chief complaint of Unwitnessed Fall (09 Nov 2021 12:46)      INTERVAL EVENTS: JANIE    SUBJECTIVE / INTERVAL HPI: Patient seen and examined at bedside. Pt had an episode of vomiting this morning. Pt has also been lethargic today and not eating much.     ROS: negative unless otherwise stated above.    VITAL SIGNS:  Vital Signs Last 24 Hrs  T(C): 36.2 (10 Nov 2021 12:38), Max: 37.1 (09 Nov 2021 20:56)  T(F): 97.2 (10 Nov 2021 12:38), Max: 98.7 (09 Nov 2021 20:56)  HR: 96 (10 Nov 2021 12:38) (91 - 100)  BP: 99/59 (10 Nov 2021 12:38) (98/67 - 112/75)  BP(mean): --  RR: 15 (10 Nov 2021 12:38) (15 - 17)  SpO2: 96% (10 Nov 2021 12:38) (96% - 97%)      11-09-21 @ 07:01  -  11-10-21 @ 07:00  --------------------------------------------------------  IN: 0 mL / OUT: 275 mL / NET: -275 mL    11-10-21 @ 07:01  -  11-10-21 @ 15:22  --------------------------------------------------------  IN: 0 mL / OUT: 1350 mL / NET: -1350 mL        PHYSICAL EXAM:    General: NAD  HEENT: MMM  Neck: supple  Cardiovascular: +S1/S2; RRR  Respiratory: CTA B/L; no W/R/R  Gastrointestinal: soft, NT/ND  Extremities: WWP; no edema, clubbing or cyanosis  Vascular: 2+ radial, DP/PT pulses B/L  Neurological: AAOx3; no focal deficits    MEDICATIONS:  MEDICATIONS  (STANDING):  apixaban 5 milliGRAM(s) Oral every 12 hours  dextrose 40% Gel 15 Gram(s) Oral once  dextrose 5%. 1000 milliLiter(s) (100 mL/Hr) IV Continuous <Continuous>  dextrose 5%. 1000 milliLiter(s) (50 mL/Hr) IV Continuous <Continuous>  dextrose 50% Injectable 25 Gram(s) IV Push once  dextrose 50% Injectable 12.5 Gram(s) IV Push once  dextrose 50% Injectable 25 Gram(s) IV Push once  folic acid 1 milliGRAM(s) Oral daily  glucagon  Injectable 1 milliGRAM(s) IntraMuscular once  influenza  Vaccine (HIGH DOSE) 0.7 milliLiter(s) IntraMuscular once  insulin lispro (ADMELOG) corrective regimen sliding scale   SubCutaneous Before meals and at bedtime  multivitamin 1 Tablet(s) Oral daily  polyethylene glycol 3350 17 Gram(s) Oral every 12 hours  povidone iodine 5% Nasal Swab 1 Application(s) Both Nostrils once  senna 2 Tablet(s) Oral at bedtime    MEDICATIONS  (PRN):  acetaminophen     Tablet .. 650 milliGRAM(s) Oral every 6 hours PRN Temp greater or equal to 38C (100.4F), Mild Pain (1 - 3)      ALLERGIES:  Allergies    No Known Allergies    Intolerances        LABS:                        9.0    7.10  )-----------( 224      ( 09 Nov 2021 12:48 )             28.1     11-09    142  |  110<H>  |  20  ----------------------------<  125<H>  4.3   |  24  |  0.63    Ca    8.1<L>      09 Nov 2021 12:48  Phos  3.8     11-09  Mg     2.1     11-09    TPro  5.4<L>  /  Alb  2.4<L>  /  TBili  0.6  /  DBili  x   /  AST  41<H>  /  ALT  33  /  AlkPhos  162<H>  11-09        CAPILLARY BLOOD GLUCOSE      POCT Blood Glucose.: 106 mg/dL (10 Nov 2021 12:23)      RADIOLOGY & ADDITIONAL TESTS: Reviewed. CC: Patient is a 86y old  Female who presents with a chief complaint of Unwitnessed Fall (09 Nov 2021 12:46)      INTERVAL EVENTS: JANIE    SUBJECTIVE / INTERVAL HPI: Patient seen and examined at bedside. Pt had an episode of vomiting this morning. Pt has also been lethargic today and not eating much.     ROS: negative unless otherwise stated above.    VITAL SIGNS:  Vital Signs Last 24 Hrs  T(C): 36.2 (10 Nov 2021 12:38), Max: 37.1 (09 Nov 2021 20:56)  T(F): 97.2 (10 Nov 2021 12:38), Max: 98.7 (09 Nov 2021 20:56)  HR: 96 (10 Nov 2021 12:38) (91 - 100)  BP: 99/59 (10 Nov 2021 12:38) (98/67 - 112/75)  BP(mean): --  RR: 15 (10 Nov 2021 12:38) (15 - 17)  SpO2: 96% (10 Nov 2021 12:38) (96% - 97%)      11-09-21 @ 07:01  -  11-10-21 @ 07:00  --------------------------------------------------------  IN: 0 mL / OUT: 275 mL / NET: -275 mL    11-10-21 @ 07:01  -  11-10-21 @ 15:22  --------------------------------------------------------  IN: 0 mL / OUT: 1350 mL / NET: -1350 mL        PHYSICAL EXAM:    General: NAD, more lethargic today  HEENT: MMM, bruise above right brow  Neck: supple  Cardiovascular: +S1/S2; RRR  Respiratory: CTA B/L; no W/R/R  Gastrointestinal: soft, NT/ND  Extremities: WWP; no edema, clubbing or cyanosis  Vascular: 2+ radial, DP/PT pulses B/L  Neurological: AAOx3; no focal deficits, 3-4/5 strength in LE b/l    MEDICATIONS:  MEDICATIONS  (STANDING):  apixaban 5 milliGRAM(s) Oral every 12 hours  dextrose 40% Gel 15 Gram(s) Oral once  dextrose 5%. 1000 milliLiter(s) (100 mL/Hr) IV Continuous <Continuous>  dextrose 5%. 1000 milliLiter(s) (50 mL/Hr) IV Continuous <Continuous>  dextrose 50% Injectable 25 Gram(s) IV Push once  dextrose 50% Injectable 12.5 Gram(s) IV Push once  dextrose 50% Injectable 25 Gram(s) IV Push once  folic acid 1 milliGRAM(s) Oral daily  glucagon  Injectable 1 milliGRAM(s) IntraMuscular once  influenza  Vaccine (HIGH DOSE) 0.7 milliLiter(s) IntraMuscular once  insulin lispro (ADMELOG) corrective regimen sliding scale   SubCutaneous Before meals and at bedtime  multivitamin 1 Tablet(s) Oral daily  polyethylene glycol 3350 17 Gram(s) Oral every 12 hours  povidone iodine 5% Nasal Swab 1 Application(s) Both Nostrils once  senna 2 Tablet(s) Oral at bedtime    MEDICATIONS  (PRN):  acetaminophen     Tablet .. 650 milliGRAM(s) Oral every 6 hours PRN Temp greater or equal to 38C (100.4F), Mild Pain (1 - 3)      ALLERGIES:  Allergies    No Known Allergies    Intolerances        LABS:                        9.0    7.10  )-----------( 224      ( 09 Nov 2021 12:48 )             28.1     11-09    142  |  110<H>  |  20  ----------------------------<  125<H>  4.3   |  24  |  0.63    Ca    8.1<L>      09 Nov 2021 12:48  Phos  3.8     11-09  Mg     2.1     11-09    TPro  5.4<L>  /  Alb  2.4<L>  /  TBili  0.6  /  DBili  x   /  AST  41<H>  /  ALT  33  /  AlkPhos  162<H>  11-09        CAPILLARY BLOOD GLUCOSE      POCT Blood Glucose.: 106 mg/dL (10 Nov 2021 12:23)      RADIOLOGY & ADDITIONAL TESTS: Reviewed.

## 2021-11-10 NOTE — PROGRESS NOTE ADULT - PROBLEM SELECTOR PLAN 1
Unwitnessed fall at home likely yesterday morning. Denies preceding lightheadedness, dizziness, or palpitations. Memory of event is limited but denies LOC. CTH and C-spine without bleed or fractures.    - F/u ECG  - cardiac enzymes peaked and now downtrending - likely due to rhabdo and demand ischemia from being down  - pro-BNP elevated however euvolemic to hypovolemic on exam  - TSH - wnl  - Vitamin B12- wnl  - Folate- low on supplementation  - Orthostatics when able to stand  - CT abd/pelvis confirmed no hip fracture just soft tissue injury

## 2021-11-10 NOTE — PROGRESS NOTE ADULT - PROBLEM SELECTOR PLAN 9
RESOLVED I/s/o recent fall. Lactic acidosis to 2.9 and CK elevated at 2565. S/p 2L NS.  -mild CK, Lfts  - stopped trending CK - downtrending  - stopped fluids

## 2021-11-10 NOTE — PROGRESS NOTE ADULT - PROBLEM SELECTOR PLAN 3
Bladder scan showed >300cc in bladder. urology consulted for difficult straight cath. ramirez was placed by urology.  - failed trial of void - will need ramirez on discharge and urology f/u  - consult urology again

## 2021-11-10 NOTE — PROGRESS NOTE ADULT - PROBLEM SELECTOR PLAN 5
Pt came in with a Hgb of 12.4 (baseline hgb around 10-10.5). Hgb now 7.8. No signs of blood loss/pooling in pelvis on CT. Likely concentrated on admission and repeat 9.5 was dilutional after receiving 2L of IVF. However will continue to monitor closely.  - vitals have been stable but BP always runs around 100/60  - Hemolysis less likely: elevated LDH to 429 but normal haptoglobin and normal bilirubin     PLAN:  - monitor CBC  - f/u repeat labs  - monitor HR, BP closely  - maintain active t&s

## 2021-11-10 NOTE — PROGRESS NOTE ADULT - NUTRITIONAL ASSESSMENT
This patient has been assessed with a concern for Malnutrition and has been determined to have a diagnosis/diagnoses of Severe protein-calorie malnutrition.    This patient is being managed with:   Diet Regular-  Supplement Feeding Modality:  Oral  Ensure Enlive Cans or Servings Per Day:  1       Frequency:  Two Times a day  Entered: Nov 7 2021 12:27PM    

## 2021-11-10 NOTE — PROGRESS NOTE ADULT - PROBLEM SELECTOR PLAN 2
Pt has bandemia and leukocytosis. She is afebrile, no tachycardia or tachypnea however she did have a bp of 93/59 at one point. Pt also has a starvation ketosis and lactic acidosis.   - UA has bacteria and nitrites, no leuks <5WBC  - Urine culture- insignificant growth    PLAN:  - treated for sepsis but now resolved  - d/c'ed antibiotics   - s/p 4L IVF  - off fluids   - monitor Gap and acidosis

## 2021-11-11 LAB
CULTURE RESULTS: SIGNIFICANT CHANGE UP
CULTURE RESULTS: SIGNIFICANT CHANGE UP
SPECIMEN SOURCE: SIGNIFICANT CHANGE UP
SPECIMEN SOURCE: SIGNIFICANT CHANGE UP

## 2021-11-12 ENCOUNTER — NON-APPOINTMENT (OUTPATIENT)
Age: 86
End: 2021-11-12

## 2021-11-15 DIAGNOSIS — Z86.711 PERSONAL HISTORY OF PULMONARY EMBOLISM: ICD-10-CM

## 2021-11-15 DIAGNOSIS — E87.2 ACIDOSIS: ICD-10-CM

## 2021-11-15 DIAGNOSIS — D72.825 BANDEMIA: ICD-10-CM

## 2021-11-15 DIAGNOSIS — Z92.3 PERSONAL HISTORY OF IRRADIATION: ICD-10-CM

## 2021-11-15 DIAGNOSIS — S72.001A FRACTURE OF UNSPECIFIED PART OF NECK OF RIGHT FEMUR, INITIAL ENCOUNTER FOR CLOSED FRACTURE: ICD-10-CM

## 2021-11-15 DIAGNOSIS — E87.0 HYPEROSMOLALITY AND HYPERNATREMIA: ICD-10-CM

## 2021-11-15 DIAGNOSIS — E43 UNSPECIFIED SEVERE PROTEIN-CALORIE MALNUTRITION: ICD-10-CM

## 2021-11-15 DIAGNOSIS — I24.8 OTHER FORMS OF ACUTE ISCHEMIC HEART DISEASE: ICD-10-CM

## 2021-11-15 DIAGNOSIS — R11.2 NAUSEA WITH VOMITING, UNSPECIFIED: ICD-10-CM
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
## 2021-11-15 DIAGNOSIS — N39.0 URINARY TRACT INFECTION, SITE NOT SPECIFIED: ICD-10-CM

## 2021-11-15 DIAGNOSIS — Z87.891 PERSONAL HISTORY OF NICOTINE DEPENDENCE: ICD-10-CM

## 2021-11-15 DIAGNOSIS — M62.82 RHABDOMYOLYSIS: ICD-10-CM

## 2021-11-15 DIAGNOSIS — R33.9 RETENTION OF URINE, UNSPECIFIED: ICD-10-CM

## 2021-11-15 DIAGNOSIS — E88.89 OTHER SPECIFIED METABOLIC DISORDERS: ICD-10-CM
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
## 2021-11-15 DIAGNOSIS — Z85.3 PERSONAL HISTORY OF MALIGNANT NEOPLASM OF BREAST: ICD-10-CM

## 2021-11-15 DIAGNOSIS — D64.9 ANEMIA, UNSPECIFIED: ICD-10-CM

## 2021-11-15 DIAGNOSIS — R77.8 OTHER SPECIFIED ABNORMALITIES OF PLASMA PROTEINS: ICD-10-CM

## 2021-11-15 DIAGNOSIS — A41.9 SEPSIS, UNSPECIFIED ORGANISM: ICD-10-CM

## 2021-11-15 DIAGNOSIS — E80.7 DISORDER OF BILIRUBIN METABOLISM, UNSPECIFIED: ICD-10-CM

## 2021-11-16 ENCOUNTER — APPOINTMENT (OUTPATIENT)
Dept: INTERNAL MEDICINE | Facility: CLINIC | Age: 86
End: 2021-11-16

## 2021-11-19 ENCOUNTER — APPOINTMENT (OUTPATIENT)
Dept: UROLOGY | Facility: CLINIC | Age: 86
End: 2021-11-19

## 2021-12-01 NOTE — PATIENT PROFILE ADULT - STATED REASON FOR ADMISSION

## 2021-12-22 PROCEDURE — 87635 SARS-COV-2 COVID-19 AMP PRB: CPT

## 2021-12-22 PROCEDURE — 99285 EMERGENCY DEPT VISIT HI MDM: CPT

## 2021-12-22 PROCEDURE — 85025 COMPLETE CBC W/AUTO DIFF WBC: CPT

## 2021-12-22 PROCEDURE — 70450 CT HEAD/BRAIN W/O DYE: CPT | Mod: MG

## 2021-12-22 PROCEDURE — 85045 AUTOMATED RETICULOCYTE COUNT: CPT

## 2021-12-22 PROCEDURE — 84443 ASSAY THYROID STIM HORMONE: CPT

## 2021-12-22 PROCEDURE — 97161 PT EVAL LOW COMPLEX 20 MIN: CPT

## 2021-12-22 PROCEDURE — 84295 ASSAY OF SERUM SODIUM: CPT

## 2021-12-22 PROCEDURE — 86850 RBC ANTIBODY SCREEN: CPT

## 2021-12-22 PROCEDURE — 82550 ASSAY OF CK (CPK): CPT

## 2021-12-22 PROCEDURE — 82378 CARCINOEMBRYONIC ANTIGEN: CPT

## 2021-12-22 PROCEDURE — 86900 BLOOD TYPING SEROLOGIC ABO: CPT

## 2021-12-22 PROCEDURE — 84484 ASSAY OF TROPONIN QUANT: CPT

## 2021-12-22 PROCEDURE — 85610 PROTHROMBIN TIME: CPT

## 2021-12-22 PROCEDURE — 71260 CT THORAX DX C+: CPT | Mod: MG

## 2021-12-22 PROCEDURE — 71045 X-RAY EXAM CHEST 1 VIEW: CPT

## 2021-12-22 PROCEDURE — 83010 ASSAY OF HAPTOGLOBIN QUANT: CPT

## 2021-12-22 PROCEDURE — 81001 URINALYSIS AUTO W/SCOPE: CPT

## 2021-12-22 PROCEDURE — 84100 ASSAY OF PHOSPHORUS: CPT

## 2021-12-22 PROCEDURE — 83540 ASSAY OF IRON: CPT

## 2021-12-22 PROCEDURE — 80053 COMPREHEN METABOLIC PANEL: CPT

## 2021-12-22 PROCEDURE — 87040 BLOOD CULTURE FOR BACTERIA: CPT

## 2021-12-22 PROCEDURE — 82330 ASSAY OF CALCIUM: CPT

## 2021-12-22 PROCEDURE — 86300 IMMUNOASSAY TUMOR CA 15-3: CPT

## 2021-12-22 PROCEDURE — 93005 ELECTROCARDIOGRAM TRACING: CPT

## 2021-12-22 PROCEDURE — 96374 THER/PROPH/DIAG INJ IV PUSH: CPT

## 2021-12-22 PROCEDURE — 83605 ASSAY OF LACTIC ACID: CPT

## 2021-12-22 PROCEDURE — 84132 ASSAY OF SERUM POTASSIUM: CPT

## 2021-12-22 PROCEDURE — 97535 SELF CARE MNGMENT TRAINING: CPT

## 2021-12-22 PROCEDURE — 93970 EXTREMITY STUDY: CPT

## 2021-12-22 PROCEDURE — 82010 KETONE BODYS QUAN: CPT

## 2021-12-22 PROCEDURE — 97162 PT EVAL MOD COMPLEX 30 MIN: CPT

## 2021-12-22 PROCEDURE — 82607 VITAMIN B-12: CPT

## 2021-12-22 PROCEDURE — 82746 ASSAY OF FOLIC ACID SERUM: CPT

## 2021-12-22 PROCEDURE — 83880 ASSAY OF NATRIURETIC PEPTIDE: CPT

## 2021-12-22 PROCEDURE — 87086 URINE CULTURE/COLONY COUNT: CPT

## 2021-12-22 PROCEDURE — 85027 COMPLETE CBC AUTOMATED: CPT

## 2021-12-22 PROCEDURE — 86901 BLOOD TYPING SEROLOGIC RH(D): CPT

## 2021-12-22 PROCEDURE — 36415 COLL VENOUS BLD VENIPUNCTURE: CPT

## 2021-12-22 PROCEDURE — 97116 GAIT TRAINING THERAPY: CPT

## 2021-12-22 PROCEDURE — 97110 THERAPEUTIC EXERCISES: CPT

## 2021-12-22 PROCEDURE — 87186 SC STD MICRODIL/AGAR DIL: CPT

## 2021-12-22 PROCEDURE — 83550 IRON BINDING TEST: CPT

## 2021-12-22 PROCEDURE — 74177 CT ABD & PELVIS W/CONTRAST: CPT | Mod: MG

## 2021-12-22 PROCEDURE — 93306 TTE W/DOPPLER COMPLETE: CPT

## 2021-12-22 PROCEDURE — 82728 ASSAY OF FERRITIN: CPT

## 2021-12-22 PROCEDURE — G1004: CPT

## 2021-12-22 PROCEDURE — 82962 GLUCOSE BLOOD TEST: CPT

## 2021-12-22 PROCEDURE — 85730 THROMBOPLASTIN TIME PARTIAL: CPT

## 2021-12-22 PROCEDURE — 83735 ASSAY OF MAGNESIUM: CPT

## 2021-12-22 PROCEDURE — 73564 X-RAY EXAM KNEE 4 OR MORE: CPT

## 2021-12-22 PROCEDURE — 80048 BASIC METABOLIC PNL TOTAL CA: CPT

## 2021-12-22 PROCEDURE — 83615 LACTATE (LD) (LDH) ENZYME: CPT

## 2021-12-22 PROCEDURE — 82553 CREATINE MB FRACTION: CPT

## 2021-12-22 PROCEDURE — 86147 CARDIOLIPIN ANTIBODY EA IG: CPT

## 2021-12-22 PROCEDURE — 72192 CT PELVIS W/O DYE: CPT

## 2021-12-22 PROCEDURE — 82803 BLOOD GASES ANY COMBINATION: CPT

## 2021-12-22 PROCEDURE — 97530 THERAPEUTIC ACTIVITIES: CPT

## 2021-12-22 PROCEDURE — 73502 X-RAY EXAM HIP UNI 2-3 VIEWS: CPT

## 2021-12-22 PROCEDURE — 86769 SARS-COV-2 COVID-19 ANTIBODY: CPT

## 2021-12-22 PROCEDURE — 86146 BETA-2 GLYCOPROTEIN ANTIBODY: CPT

## 2021-12-22 PROCEDURE — 72125 CT NECK SPINE W/O DYE: CPT | Mod: MG

## 2022-03-21 ENCOUNTER — APPOINTMENT (OUTPATIENT)
Dept: MAMMOGRAPHY | Facility: HOSPITAL | Age: 87
End: 2022-03-21

## 2022-03-21 ENCOUNTER — APPOINTMENT (OUTPATIENT)
Dept: ULTRASOUND IMAGING | Facility: HOSPITAL | Age: 87
End: 2022-03-21

## 2022-10-24 NOTE — HISTORY OF PRESENT ILLNESS
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  She was admitted to [facility] 10/18/21-10/21/21 for weakness, unintentional weight loss.  She was diagnosed with PE, DVT, E.coli cystitis and was placed on Eliquis.  It was noted that she did not follow up with her providers due to the COVID pandemic and she was referred by [facility] for continuity of care.  \par \par OncHx:\par She experienced a fall on 8/22/18 and subsequently palpated a left breast mass a week later and followed up in [facility] ED. She notes she did not have a PCP at the time as she had been generally healthy. Denies any skin changes or nipple discharge at the time. \par \par On 9/20/18, she underwent LEFT breast U/S, which revealed the following:\par At the 1:00 position, 4cmFN a 1.8 x 1.6 x 1.6 cm hypo/isoechoic mass with ill defined borders demonstrating internal vascularity.\par \par She saw Dr. Bear for initial eval on 9/26/18, was noted to have skin dimpling.\par \par On 10/2/18, she had a dx mammo showing a highly suspicious mass in the upper outer left breast corresponding to recently diagnosed 3 cm mass on ultrasound. Same day US guided biopsy of the mass showed invasive duct carcinoma, well to moderately differentiated with calcifications, largest focus in the core measuring 1.3 cm, ER/ NV positive, HER-2 equivocal, FISH negative. Radiology was subsequently asked to comment on the RIGHT breast, and it showed the following:\par Multiple scattered and grouped calcifications present in the right breast. Some of the calcifications have a linear distribution pattern. \par \par On 11/5/18, she had stereotactic core biopsy (right mid lower breast) with pathology revealing the following:\par - Breast tissue with duct ectasia, cysts and usual ductal hyperplasia\par - Calcifications associated with benign epithelium and stroma\par \par She underwent LEFT mastectomy and ALND on 11/19/18. Final pathology revealed the following: \par 1. Left sentinel lymph node, excision: - Metastatic carcinoma involving 1 lymph node with extranodal extension spanning 5 mm (1/1). \par 2. Left breast, mastectomy:\par - Invasive duct carcinoma, well-differentiated with prominent central sclerosis. 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 DCIS is noted only adjacent to invasive tumor.\par -All margins negative for invasive and in-situ duct carcinoma with greater than 1 cm clearance.\par - Biopsy site changes.\par 3. Medial margin, excision: - Breast tissue negative for carcinoma.\par 4. Left axillary lymph node dissection:\par - 17 lymph nodes negative for tumor.\par - Focus of invasive duct carcinoma present within adjacent adipose tissue. \par \par Note: The largest contiguous lymph node metastasis in part 1 measures 3 mm in greatest dimension. A focus of extranodal\par extension spanning 5mm is noted in part 1. In part 4, A focus of carcinoma spanning 11 mm is noted within fat. The tumor in parts 1 and 4 are morphologically similar to the tumor in part 2.\par \par 50 Gy/25Fr to chest wall/reg nodes 3/7/19 to 4/10/19 by Alberto Neal.\par \par She saw medical oncologist Yael Beasley who prescribed anastrozole 8/2019 - early 2020.  Lost to follow up during COVID pandemic.\par \par 10/25/2019 DEXA: osteoporosis (Left hip T-score -2.7, femur - 2.5, spine -1.2)\par \par 12/10/19 Dx mammo/US R breast:\par 1. New microclip in mid inferior right breast with scattered and grouped, predominantly round probably benign-appearing microcalcifications, mostly superior and lateral to is, some of which are in a linear configuration, significantly decreased from prior study secondary to interval biopsy.\par 2. Negative breast ultrasound.\par \par She was admitted to [facility] for pulmonary emboli on 10/18/2021\par \par 10/18/21 C/A/P: Acute PE RML and RLL, s/p left mastectomy, chronic radiation pneumonitis JARED, suspected cystitis.\par \par 10/18/21 US LE doppler: right popliteal vein DVT, right calf vein DVT\par \par 10/18/21 CT brain:no acute intracranial hemorrhage, mass effect, or demarcated territorial infarction.\par \par 10/18/21 ECHO: normal cardiac function.\par \par Family history is negative for breast or ovarian cancer. \par   [de-identified] : invasive ductal carcinoma [de-identified] : ER+, VT+, HER2-, Oncotype Dx RS 6

## 2022-10-25 ENCOUNTER — APPOINTMENT (OUTPATIENT)
Dept: HEMATOLOGY ONCOLOGY | Facility: CLINIC | Age: 87
End: 2022-10-25

## 2022-11-01 ENCOUNTER — NON-APPOINTMENT (OUTPATIENT)
Age: 87
End: 2022-11-01

## 2022-12-25 NOTE — OCCUPATIONAL THERAPY INITIAL EVALUATION ADULT - LEVEL OF CONSCIOUSNESS, OT EVAL
The patient was signed out to me by Dr. Avelar for follow-up on Covid/flu/RSV swab and UA.  The sign-out included relevant details of the history, physical, and work-up to date. The chart has been reviewed, new test results have been noted, and the patient has been re-evaluated.      Lab Results     Results for orders placed or performed during the hospital encounter of 12/24/22   Lipase   Result Value Ref Range    Lipase 85 73 - 393 Units/L   Magnesium   Result Value Ref Range    Magnesium 2.4 1.7 - 2.4 mg/dL   CBC with Automated Differential (performable only)   Result Value Ref Range    WBC 8.7 4.2 - 11.0 K/mcL    RBC 4.49 4.00 - 5.20 mil/mcL    HGB 10.2 (L) 12.0 - 15.5 g/dL    HCT 35.0 (L) 36.0 - 46.5 %    MCV 78.0 78.0 - 100.0 fl    MCH 22.7 (L) 26.0 - 34.0 pg    MCHC 29.1 (L) 32.0 - 36.5 g/dL    RDW-CV 19.5 (H) 11.0 - 15.0 %    RDW-SD 54.1 (H) 39.0 - 50.0 fL     (H) 140 - 450 K/mcL    NRBC 0 <=0 /100 WBC    Neutrophil, Percent 82 %    Lymphocytes, Percent 13 %    Mono, Percent 3 %    Eosinophils, Percent 1 %    Basophils, Percent 0 %    Immature Granulocytes 1 %    Absolute Neutrophils 7.2 1.8 - 7.7 K/mcL    Absolute Lymphocytes 1.1 1.0 - 4.8 K/mcL    Absolute Monocytes 0.3 0.3 - 0.9 K/mcL    Absolute Eosinophils  0.1 0.0 - 0.5 K/mcL    Absolute Basophils 0.0 0.0 - 0.3 K/mcL    Absolute Immmature Granulocytes 0.0 0.0 - 0.2 K/mcL   COVID/Flu/RSV panel   Result Value Ref Range    Rapid SARS-COV-2 by PCR Not Detected Not Detected / Detected / Presumptive Positive / Inhibitors present    Influenza A by PCR Not Detected Not Detected    Influenza B by PCR Not Detected Not Detected    RSV BY PCR Not Detected Not Detected    Isolation Guidelines      Procedural Comment     Urinalysis With Microscopy & Culture If Indicated   Result Value Ref Range    COLOR, URINALYSIS Yellow     APPEARANCE, URINALYSIS Hazy     GLUCOSE, URINALYSIS Negative Negative mg/dL    BILIRUBIN, URINALYSIS Negative Negative    KETONES,  URINALYSIS Negative Negative mg/dL    SPECIFIC GRAVITY, URINALYSIS 1.020 1.005 - 1.030    OCCULT BLOOD, URINALYSIS Small (A) Negative    PH, URINALYSIS 6.5 5.0 - 7.0    PROTEIN, URINALYSIS Negative Negative mg/dL    UROBILINOGEN, URINALYSIS 0.2 0.2, 1.0 mg/dL    NITRITE, URINALYSIS Negative Negative    LEUKOCYTE ESTERASE, URINALYSIS Moderate (A) Negative    SQUAMOUS EPITHELIAL, URINALYSIS None Seen None Seen, 1 to 5 /hpf    ERYTHROCYTES, URINALYSIS 11 to 25 (A) None Seen, 1 to 2 /hpf    LEUKOCYTES, URINALYSIS 26 to 100 (A) None Seen, 1 to 5 /hpf    BACTERIA, URINALYSIS Few (A) None Seen /hpf    HYALINE CASTS, URINALYSIS 6 to 10 (A) None Seen, 1 to 5 /lpf    MUCUS Present    ISTAT8 VENOUS  POINT OF CARE   Result Value Ref Range    BUN - POINT OF CARE 11 6 - 20 mg/dL    SODIUM - POINT OF CARE 139 135 - 145 mmol/L    POTASSIUM - POINT OF CARE 4.3 3.4 - 5.1 mmol/L    CHLORIDE - POINT OF CARE 105 97 - 110 mmol/L    TCO2 - POINT OF CARE 26 (H) 19 - 24 mmol/L    ANION GAP - POINT OF CARE 13 7 - 19 mmol/L    HEMATOCRIT - POINT OF CARE 37.0 36.0 - 46.5 %    HEMOGLOBIN - POINT OF CARE 12.6 12.0 - 15.5 g/dL    GLUCOSE - POINT OF CARE 119 (H) 70 - 99 mg/dL    CALCIUM, IONIZED - POINT OF CARE 1.14 (L) 1.15 - 1.29 mmol/L    Creatinine 0.40 (L) 0.51 - 0.95 mg/dL    Glomerular Filtration Rate >90 >=60   TROPONIN I  POINT OF CARE   Result Value Ref Range    TROPONIN I - POINT OF CARE <0.10 <0.10 ng/mL       Radiology Results     Imaging Results          XR Chest AP or PA (Final result)  Result time 12/24/22 22:43:36    Final result                 Impression:    IMPRESSION:    Right basal atelectasis. No definite acute cardiopulmonary findings.                 Narrative:    XR CHEST AP OR PA, 12/24/2022 10:39 PM    CLINICAL INFORMATION: Chest Pain    COMPARISON: Chest radiograph from 11/25/2022. CT chest, 11/11/2022..    FINDINGS:     The cardiomediastinal silhouette is within normal limits. Mild linear  atelectasis at the right  lung base, similar to recent prior studies.  Lungs  are otherwise clear.  There is no pleural effusion or pneumothorax.                                ED Medication Orders (From admission, onward)    Ordered Start     Status Ordering Provider    12/24/22 2213 12/24/22 2214  sodium chloride (NORMAL SALINE) 0.9 % bolus 1,000 mL  ONCE         Last MAR action: Completed ARMANI KEY    12/24/22 2213 12/24/22 2211  ondansetron (ZOFRAN) injection 4 mg  ONCE PRN         Last MAR action: Given ARMANI KEY          ED Course     ED Course as of 12/25/22 0144   Sat Dec 24, 2022   2217 Initial Impression: The pt is a 40 year old female who presents to the ED today with mid sternal chest pain. Labs, EKG, and CXR will be ordered to assess the pt's condition. Plan to administer Zofran and fluids for sx relief. Pt understands and agrees with the plan. All questions addressed. [ZP]      ED Course User Index  [ZP] Kamron Oglesbysherrie     Patient recheck:  Updated on negative covid/flu/RSV swab.  UA appears similar to prior.  Since it is from a urostomy, will await culture results prior to treatment.   She has tolerated po fluids and is stable for discharge home.     Radiology Review: I have independently interpreted the Chest X-Ray and have found No acute or active disease.  I am awaiting on the final radiology read.          Critical Care     No Critical Care        Disposition       Clinical Impression and Diagnosis  1:57 AM       ED Diagnoses     Diagnosis Comment Associated Orders       Final diagnoses    Nausea and vomiting in adult -- --    Chest pain, unspecified type -- --          The patient was provided with a recommendation to follow up with a primary care provider and obtain reassessment of his/her blood pressure within three months.    Follow Up:  Luciana Sams MD  [address] [facility] [address] [phone]    Call   As needed, If symptoms worsen          Summary of your Discharge Medications      You have  not been prescribed any medications.         Pt is discharged to home/self care in stable condition.              Discharge 12/25/2022 12:53 AM  Irma Ludwig discharge to home/self care.         Briana De Leon MD  12/25/22 0157     at times forgetful/alert

## 2023-03-01 NOTE — BRIEF OPERATIVE NOTE - PRE-OP DX
Rx Refill Note  Requested Prescriptions     Pending Prescriptions Disp Refills   • Synthroid 175 MCG tablet [Pharmacy Med Name: SYNTHROID 175 MCG TABLET] 90 tablet 1     Sig: TAKE 1 TABLET BY MOUTH EVERY DAY      Last office visit with prescribing clinician: 9/24/2021   Last telemedicine visit with prescribing clinician:   Next office visit with prescribing clinician: Visit date not found                         Would you like a call back once the refill request has been completed: [] Yes [] No    If the office needs to give you a call back, can they leave a voicemail: [] Yes [] No    Lindsey Carias MA  03/01/23, 14:06 EST  
Breast cancer  11/19/2018    Active  Jason Caballero

## 2023-05-18 NOTE — DIETITIAN INITIAL EVALUATION ADULT. - PHYSCIAL ASSESSMENT
complains of pain/discomfort
Per physical assessment: Muscle Wasting- Temporal [   ]  Clavicle/Pectoral [ x  ]  Shoulder/Deltoid [   ]  Scapula [   x]  Interosseous [   ]  Quadriceps [   ]  Gastrocnemius [   ]; Fat Wasting- Orbital [ x  ]  Buccal [   ]  Triceps [  x ]  Rib [   ]--> Suspect severe [PCM] 2/2 to physical assessment, [poor intake], and [wt loss]; please see malnutrition chart note./debilitated

## 2023-06-14 NOTE — DIETITIAN INITIAL EVALUATION ADULT. - RD TO REMAIN AVAILABLE
-- DO NOT REPLY / DO NOT REPLY ALL --  -- Message is from Engagement Center Operations ([facility]) --    General Patient Message: Patient wanted to let NP Shira Abbott know the reason she did not see her on June 7th was that she did not have any insurance since it elapsed. She made it in but the  stated she was unable to see provider, that she had to have an insurance on file. She received a letter today from office stating she missed the appointment and she figured that the office had canceled it when she came in on June 7th, she is currently working to get her insurance fixed     Caller Information       Type Contact Phone/Fax    06/14/2023 02:30 PM CDT Phone (Incoming) Luzmaria Candelaria (Self) [phone] (M)        Alternative phone number:[phone]    Can a detailed message be left? Yes    Message Turnaround: [place]:    Refer to site's KB page for routing instructions    Please give this turnaround time to the caller:   \"You can expect to receive a response 1-3 business days after your provider's clinical team reviews the message\"              
MARIAA Fajardo will put message in chart with information below.  
at high risk/yes

## 2024-01-01 NOTE — PROGRESS NOTE ADULT - TIME BILLING
as noted above
Check here if all serologies below were negative, non-reactive or immune. Otherwise select appropriate status.

## 2024-08-30 NOTE — ASU PATIENT PROFILE, ADULT - MEDICATION ADMINISTRATION INFO, PROFILE
OB triage  Speculum exam: cervix is L/T/C. No pooling, nitrazine negative, fern negative, wet prep negative.   D/c to home with precautions   no concerns

## 2024-11-25 NOTE — PROGRESS NOTE ADULT - PROBLEM SELECTOR PLAN 10
Addended by: MARCIN MAN on: 2/27/2024 02:06 PM     Modules accepted: Orders     Detail Level: Detailed Add 36103 Cpt? (Important Note: In 2017 The Use Of 27567 Is Being Tracked By Cms To Determine Future Global Period Reimbursement For Global Periods): yes Found to have DVT/PE on last admission in 10/2021. On Eliquis 5 mg bid home med.  -Restarted Eliquis 5 mg BID  - pulm consult/clearance given recent DVT and PEs    #afib on admission  - EKG showed afib - already on eliquis  - normal HR now  - will monitor  - will repeat EKG today.

## 2024-12-08 NOTE — DIETITIAN INITIAL EVALUATION ADULT. - PROBLEM SELECTOR PLAN 3
[Alert] : alert [No Acute Distress] : in no acute distress [No Respiratory Distress] : no respiratory distress [Respiration, Rhythm And Depth] : normal respiratory rhythm and effort [Normal Gait] : abnormal gait [Normal Affect] : the affect was normal Likely 2/2 starvation ketoacidosis in setting of reduced PO intake.  - f/u B hydroxy  - rehydrate per above plan